# Patient Record
Sex: MALE | Race: WHITE | NOT HISPANIC OR LATINO | Employment: OTHER | URBAN - METROPOLITAN AREA
[De-identification: names, ages, dates, MRNs, and addresses within clinical notes are randomized per-mention and may not be internally consistent; named-entity substitution may affect disease eponyms.]

---

## 2023-09-03 PROBLEM — M17.11 OSTEOARTHRITIS OF RIGHT KNEE: Status: ACTIVE | Noted: 2023-09-03

## 2023-09-03 PROBLEM — E87.6 HYPOKALEMIA: Status: ACTIVE | Noted: 2023-09-03

## 2023-09-03 PROBLEM — M17.12 PRIMARY OSTEOARTHRITIS OF LEFT KNEE: Status: ACTIVE | Noted: 2023-09-03

## 2023-09-03 PROBLEM — I10 PRIMARY HYPERTENSION: Status: ACTIVE | Noted: 2023-09-03

## 2023-09-03 PROBLEM — R93.89 ABNORMAL X-RAY: Status: ACTIVE | Noted: 2023-09-03

## 2023-09-03 PROBLEM — F41.9 ANXIETY: Status: ACTIVE | Noted: 2023-09-03

## 2023-09-03 PROBLEM — M19.079: Status: ACTIVE | Noted: 2023-09-03

## 2023-09-03 PROBLEM — F10.20 ALCOHOLISM (MULTI): Status: ACTIVE | Noted: 2023-09-03

## 2023-09-03 PROBLEM — M16.0 OSTEOARTHRITIS OF BOTH HIPS: Status: ACTIVE | Noted: 2023-09-03

## 2023-09-03 PROBLEM — Q66.6 VALGUS DEFORMITY OF BOTH FEET: Status: ACTIVE | Noted: 2023-09-03

## 2023-09-03 PROBLEM — R29.898 WEAKNESS OF RIGHT LOWER EXTREMITY: Status: ACTIVE | Noted: 2023-09-03

## 2023-09-03 PROBLEM — M21.70 LEG LENGTH DISCREPANCY: Status: ACTIVE | Noted: 2023-09-03

## 2023-09-03 PROBLEM — F33.2 MAJOR DEPRESSIVE DISORDER, RECURRENT SEVERE WITHOUT PSYCHOTIC FEATURES (MULTI): Status: ACTIVE | Noted: 2023-09-03

## 2023-09-03 PROBLEM — R26.9 ABNORMAL GAIT: Status: ACTIVE | Noted: 2023-09-03

## 2023-09-03 PROBLEM — F41.1 GAD (GENERALIZED ANXIETY DISORDER): Status: ACTIVE | Noted: 2023-09-03

## 2023-09-03 PROBLEM — Z85.820 H/O MALIGNANT MELANOMA: Status: ACTIVE | Noted: 2023-09-03

## 2023-09-03 PROBLEM — M77.32 CALCANEAL SPUR OF LEFT FOOT: Status: ACTIVE | Noted: 2023-09-03

## 2023-09-03 PROBLEM — M51.369 LUMBAR DEGENERATIVE DISC DISEASE: Status: ACTIVE | Noted: 2023-09-03

## 2023-09-03 PROBLEM — J32.0 CHRONIC MAXILLARY SINUSITIS: Status: ACTIVE | Noted: 2023-09-03

## 2023-09-03 PROBLEM — M20.21 HALLUX RIGIDUS, RIGHT FOOT: Status: ACTIVE | Noted: 2023-09-03

## 2023-09-03 PROBLEM — G62.9 NEUROPATHY: Status: ACTIVE | Noted: 2023-09-03

## 2023-09-03 PROBLEM — G92.8 TOXIC METABOLIC ENCEPHALOPATHY: Status: ACTIVE | Noted: 2023-09-03

## 2023-09-03 PROBLEM — M51.36 LUMBAR DEGENERATIVE DISC DISEASE: Status: ACTIVE | Noted: 2023-09-03

## 2023-09-03 PROBLEM — R20.0 NUMBNESS: Status: ACTIVE | Noted: 2023-09-03

## 2023-09-03 PROBLEM — F10.939 ALCOHOL WITHDRAWAL SYNDROME (MULTI): Status: ACTIVE | Noted: 2023-09-03

## 2023-09-03 PROBLEM — R09.89 ALTERED CARDIOPULMONARY TISSUE PERFUSION: Status: ACTIVE | Noted: 2023-09-03

## 2023-09-03 PROBLEM — F51.01 PRIMARY INSOMNIA: Status: ACTIVE | Noted: 2023-09-03

## 2023-09-03 PROBLEM — Z91.89 AT RISK FOR INJURY RELATED TO RESTRAINTS: Status: ACTIVE | Noted: 2023-09-03

## 2023-09-03 PROBLEM — N13.30 BILATERAL HYDRONEPHROSIS: Status: ACTIVE | Noted: 2023-09-03

## 2023-09-03 PROBLEM — G89.29 OTHER CHRONIC PAIN: Status: ACTIVE | Noted: 2023-09-03

## 2023-09-03 PROBLEM — E87.1 HYPONATREMIA: Status: ACTIVE | Noted: 2023-09-03

## 2023-09-03 PROBLEM — M17.0 PRIMARY OSTEOARTHRITIS OF BOTH KNEES: Status: ACTIVE | Noted: 2023-09-03

## 2023-09-03 PROBLEM — M20.22 HALLUX RIGIDUS OF LEFT FOOT: Status: ACTIVE | Noted: 2023-09-03

## 2023-09-03 PROBLEM — E86.0 DEHYDRATION: Status: ACTIVE | Noted: 2023-09-03

## 2023-09-03 PROBLEM — M51.26 LUMBAGO DUE TO DISPLACEMENT OF INTERVERTEBRAL DISC: Status: ACTIVE | Noted: 2023-09-03

## 2023-09-03 PROBLEM — E78.2 MIXED HYPERLIPIDEMIA: Status: ACTIVE | Noted: 2023-09-03

## 2023-09-03 RX ORDER — ALPRAZOLAM 1 MG/1
TABLET ORAL
COMMUNITY
Start: 2019-10-02 | End: 2023-12-14 | Stop reason: WASHOUT

## 2023-09-03 RX ORDER — ATORVASTATIN CALCIUM 10 MG/1
1 TABLET, FILM COATED ORAL DAILY
COMMUNITY
End: 2024-02-27

## 2023-09-03 RX ORDER — YEAST,DRIED (S. CEREVISIAE)
POWDER (GRAM) ORAL
COMMUNITY
Start: 2022-11-10 | End: 2023-12-14 | Stop reason: WASHOUT

## 2023-09-03 RX ORDER — HYDROXYZINE PAMOATE 25 MG/1
1-2 CAPSULE ORAL NIGHTLY PRN
COMMUNITY
Start: 2022-11-10 | End: 2023-12-14 | Stop reason: WASHOUT

## 2023-09-03 RX ORDER — ESCITALOPRAM OXALATE 5 MG/1
1 TABLET ORAL DAILY
COMMUNITY
End: 2023-12-14 | Stop reason: WASHOUT

## 2023-09-03 RX ORDER — CHLORDIAZEPOXIDE HYDROCHLORIDE 25 MG/1
1 CAPSULE, GELATIN COATED ORAL 2 TIMES DAILY
COMMUNITY
Start: 2021-08-13 | End: 2023-12-14 | Stop reason: WASHOUT

## 2023-09-03 RX ORDER — IBUPROFEN 800 MG/1
1 TABLET ORAL EVERY 6 HOURS PRN
COMMUNITY
Start: 2022-11-04

## 2023-09-03 RX ORDER — NAPROXEN SODIUM 220 MG
TABLET ORAL
COMMUNITY

## 2023-09-03 RX ORDER — TAMSULOSIN HYDROCHLORIDE 0.4 MG/1
1 CAPSULE ORAL DAILY
COMMUNITY
End: 2023-12-14 | Stop reason: WASHOUT

## 2023-09-03 RX ORDER — DICLOFENAC SODIUM 20 MG/G
SOLUTION TOPICAL
COMMUNITY
Start: 2021-09-30 | End: 2023-12-14 | Stop reason: WASHOUT

## 2023-09-03 RX ORDER — METOPROLOL SUCCINATE 50 MG/1
1 TABLET, EXTENDED RELEASE ORAL DAILY
COMMUNITY

## 2023-09-03 RX ORDER — NAPROXEN SODIUM 220 MG/1
1 TABLET, FILM COATED ORAL DAILY
COMMUNITY
End: 2023-12-14 | Stop reason: WASHOUT

## 2023-09-03 RX ORDER — IBUPROFEN 100 MG/5ML
1 SUSPENSION, ORAL (FINAL DOSE FORM) ORAL DAILY
COMMUNITY
Start: 2022-11-10

## 2023-09-03 RX ORDER — PNV NO.95/FERROUS FUM/FOLIC AC 28MG-0.8MG
1 TABLET ORAL DAILY
COMMUNITY
Start: 2022-11-10

## 2023-09-03 RX ORDER — ESCITALOPRAM OXALATE 10 MG/1
1 TABLET ORAL DAILY
COMMUNITY
Start: 2021-08-13 | End: 2023-12-14 | Stop reason: WASHOUT

## 2023-09-03 RX ORDER — GABAPENTIN 100 MG/1
1 CAPSULE ORAL EVERY 12 HOURS
COMMUNITY
End: 2023-12-14 | Stop reason: WASHOUT

## 2023-09-03 RX ORDER — LOSARTAN POTASSIUM 100 MG/1
1 TABLET ORAL DAILY
COMMUNITY
End: 2024-05-25 | Stop reason: SDUPTHER

## 2023-09-03 RX ORDER — MULTIVITAMIN
1 TABLET ORAL DAILY
COMMUNITY
Start: 2022-11-10

## 2023-09-03 RX ORDER — NAPROXEN 250 MG/1
TABLET ORAL
COMMUNITY
End: 2023-12-14 | Stop reason: WASHOUT

## 2023-09-03 RX ORDER — GUAIFENESIN 1200 MG
1 TABLET, EXTENDED RELEASE 12 HR ORAL EVERY 6 HOURS PRN
COMMUNITY

## 2023-09-11 PROBLEM — M22.2X2 PATELLOFEMORAL SYNDROME OF LEFT KNEE: Status: ACTIVE | Noted: 2023-09-11

## 2023-12-11 ENCOUNTER — TELEPHONE (OUTPATIENT)
Dept: PRIMARY CARE | Facility: CLINIC | Age: 73
End: 2023-12-11
Payer: MEDICARE

## 2023-12-11 DIAGNOSIS — I10 PRIMARY HYPERTENSION: Primary | ICD-10-CM

## 2023-12-11 NOTE — TELEPHONE ENCOUNTER
Patient would like orders placed for blood work prior to his 12/14/23 appointment if needed.  Please notify when placed.  Thank you.

## 2023-12-13 ENCOUNTER — LAB (OUTPATIENT)
Dept: LAB | Facility: LAB | Age: 73
End: 2023-12-13
Payer: MEDICARE

## 2023-12-13 DIAGNOSIS — I10 PRIMARY HYPERTENSION: ICD-10-CM

## 2023-12-13 LAB
ANION GAP SERPL CALC-SCNC: 12 MMOL/L
BUN SERPL-MCNC: 10 MG/DL (ref 8–25)
CALCIUM SERPL-MCNC: 9.3 MG/DL (ref 8.5–10.4)
CHLORIDE SERPL-SCNC: 100 MMOL/L (ref 97–107)
CO2 SERPL-SCNC: 24 MMOL/L (ref 24–31)
CREAT SERPL-MCNC: 0.9 MG/DL (ref 0.4–1.6)
GFR SERPL CREATININE-BSD FRML MDRD: >90 ML/MIN/1.73M*2
GLUCOSE SERPL-MCNC: 88 MG/DL (ref 65–99)
POTASSIUM SERPL-SCNC: 4.8 MMOL/L (ref 3.4–5.1)
SODIUM SERPL-SCNC: 136 MMOL/L (ref 133–145)

## 2023-12-13 PROCEDURE — 36415 COLL VENOUS BLD VENIPUNCTURE: CPT

## 2023-12-13 PROCEDURE — 80048 BASIC METABOLIC PNL TOTAL CA: CPT

## 2023-12-14 ENCOUNTER — OFFICE VISIT (OUTPATIENT)
Dept: PRIMARY CARE | Facility: CLINIC | Age: 73
End: 2023-12-14
Payer: MEDICARE

## 2023-12-14 VITALS
HEART RATE: 69 BPM | SYSTOLIC BLOOD PRESSURE: 126 MMHG | HEIGHT: 72 IN | DIASTOLIC BLOOD PRESSURE: 78 MMHG | OXYGEN SATURATION: 97 % | WEIGHT: 275 LBS | BODY MASS INDEX: 37.25 KG/M2

## 2023-12-14 DIAGNOSIS — I10 PRIMARY HYPERTENSION: ICD-10-CM

## 2023-12-14 DIAGNOSIS — E78.2 MIXED HYPERLIPIDEMIA: Primary | ICD-10-CM

## 2023-12-14 DIAGNOSIS — Z12.11 SCREEN FOR COLON CANCER: ICD-10-CM

## 2023-12-14 DIAGNOSIS — M17.0 OSTEOARTHRITIS OF BOTH KNEES, UNSPECIFIED OSTEOARTHRITIS TYPE: ICD-10-CM

## 2023-12-14 PROCEDURE — 3074F SYST BP LT 130 MM HG: CPT | Performed by: FAMILY MEDICINE

## 2023-12-14 PROCEDURE — 3078F DIAST BP <80 MM HG: CPT | Performed by: FAMILY MEDICINE

## 2023-12-14 PROCEDURE — 1036F TOBACCO NON-USER: CPT | Performed by: FAMILY MEDICINE

## 2023-12-14 PROCEDURE — 1159F MED LIST DOCD IN RCRD: CPT | Performed by: FAMILY MEDICINE

## 2023-12-14 PROCEDURE — 99214 OFFICE O/P EST MOD 30 MIN: CPT | Performed by: FAMILY MEDICINE

## 2023-12-14 PROCEDURE — 1125F AMNT PAIN NOTED PAIN PRSNT: CPT | Performed by: FAMILY MEDICINE

## 2023-12-14 ASSESSMENT — PATIENT HEALTH QUESTIONNAIRE - PHQ9
2. FEELING DOWN, DEPRESSED OR HOPELESS: NOT AT ALL
1. LITTLE INTEREST OR PLEASURE IN DOING THINGS: NOT AT ALL
SUM OF ALL RESPONSES TO PHQ9 QUESTIONS 1 AND 2: 0

## 2023-12-14 ASSESSMENT — PAIN SCALES - GENERAL: PAINLEVEL: 3

## 2023-12-14 ASSESSMENT — ENCOUNTER SYMPTOMS
CHEST TIGHTNESS: 0
SHORTNESS OF BREATH: 0

## 2023-12-14 NOTE — PROGRESS NOTES
"[facility]: [facility]  E/M EVALUATION    Tito Garcia is a 72 y.o. male who presents for Follow-up.    Subjective   HTN -controlled.  Labs yesterday.  Reports compliance.  Exercise 2 days per week now.      Hyperlipidemia- controlled.      Bilateral knee pain.  -wants evaluated for replacement.     Colon screening -states doesn't have fully formed stool.  Hasn't completed cologaurd.            Review of Systems   Respiratory:  Negative for chest tightness and shortness of breath.        Objective   Vitals:    12/14/23 0743   BP: 126/78   Pulse: 69   SpO2: 97%     Physical Exam  Constitutional:       Appearance: Normal appearance.   Cardiovascular:      Rate and Rhythm: Normal rate and regular rhythm.      Heart sounds: No murmur heard.  Pulmonary:      Effort: Pulmonary effort is normal.      Breath sounds: Normal breath sounds.   Neurological:      Mental Status: He is alert.       Cholesterol   Date Value Ref Range Status   06/13/2023 148 133 - 200 MG/DL Final     Triglycerides   Date Value Ref Range Status   06/13/2023 71 40 - 150 MG/DL Final     HDL   Date Value Ref Range Status   06/13/2023 44 >40 MG/DL Final     Comment:     National Cholesterol Education Program(NCFP)guidelines:   <40 mg/dl:Low HDL-cholesterol(major risk factor for CHD)   >60 mg/dl:High HDL-cholesterol(\"negative\"risk factor for CHD)   HDL-cholesterol is affected by a number of factors,e.g.,smoking,  exercise,hormones,sex and age.       LDL Calculated   Date Value Ref Range Status   06/13/2023 90 65 - 130 MG/DL Final     Cholesterol/HDL Ratio   Date Value Ref Range Status   06/13/2023 3.4 RATIO Final     Comment:     According to the American Heart Association, the goal is to maintain   the total cholesterol/HDL ratio at 5-to-1 or lower with an optimum   ratio of 3.5-to-1.  Performed at [address] [address]       Glucose   Date Value Ref Range Status   12/13/2023 88 65 - 99 mg/dL Final "     Sodium   Date Value Ref Range Status   12/13/2023 136 133 - 145 mmol/L Final     Potassium   Date Value Ref Range Status   12/13/2023 4.8 3.4 - 5.1 mmol/L Final     ALT (SGPT)   Date Value Ref Range Status   06/13/2023 23 5 - 40 U/L Final     eGFR   Date Value Ref Range Status   12/13/2023 >90 >60 mL/min/1.73m*2 Final     Comment:     Calculations of estimated GFR are performed using the 2021 CKD-EPI Study Refit equation without the race variable for the IDMS-Traceable creatinine methods.  https://jasn.asnjournals.org/content/early/2021/09/22/ASN.6650154512     INR   Date Value Ref Range Status   08/03/2021 1.0 0.86 - 1.16 Final     Comment:     INR Therapeutic Range: 2.0-3.5  Performed at [address] [address]       Thyroid Stimulating Hormone   Date Value Ref Range Status   08/01/2021 2.59 0.27 - 4.20 MIU/L Final     Comment:     Performed at [address] [address]       Assessment/Plan      Patient Active Problem List   Diagnosis    Altered cardiopulmonary tissue perfusion    Primary hypertension    Primary osteoarthritis of left knee    Osteoarthritis of right knee    Weakness of right lower extremity    Valgus deformity of both feet    Toxic metabolic encephalopathy    Primary osteoarthritis of both knees    Primary insomnia    Other chronic pain    Osteoarthritis of both hips    Numbness    Neuropathy    Mixed hyperlipidemia    Major depressive disorder, recurrent severe without psychotic features (CMS/HCC)    Lumbar degenerative disc disease    Leg length discrepancy    Hyponatremia    Hypokalemia    Hallux rigidus, right foot    Hallux rigidus of left foot    H/O Malignant melanoma    EMILY (generalized anxiety disorder)    Lumbago due to displacement of intervertebral disc    Dehydration    Degenerative joint disease of toe    Chronic maxillary sinusitis    Calcaneal spur of left foot    Bilateral hydronephrosis    Anxiety    Alcoholism (CMS/HCC)    Alcohol  withdrawal syndrome (CMS/HCC)    Abnormal x-ray    Abnormal gait    At risk for injury related to restraints    Patellofemoral syndrome of left knee       Diagnoses and all orders for this visit:  Mixed hyperlipidemia  -     Lipid Panel; Future  Primary hypertension  -     Comprehensive Metabolic Panel; Future  Osteoarthritis of both knees, unspecified osteoarthritis type  -     Referral to Orthopaedic Surgery; Future  Screen for colon cancer    FIT test provided.   The patient was encouraged to ensure that any/all documentation is accurate and up to date, and that our office be provided a copy in the event that anything changes.         Edward Leong MD

## 2024-02-26 DIAGNOSIS — I73.9 PERIPHERAL VASCULAR DISEASE, UNSPECIFIED (CMS-HCC): ICD-10-CM

## 2024-02-27 RX ORDER — ATORVASTATIN CALCIUM 10 MG/1
10 TABLET, FILM COATED ORAL DAILY
Qty: 90 TABLET | Refills: 3 | Status: SHIPPED | OUTPATIENT
Start: 2024-02-27

## 2024-02-29 ENCOUNTER — HOSPITAL ENCOUNTER (OUTPATIENT)
Dept: RADIOLOGY | Facility: HOSPITAL | Age: 74
Discharge: HOME | End: 2024-02-29
Payer: MEDICARE

## 2024-02-29 DIAGNOSIS — M17.11 UNILATERAL PRIMARY OSTEOARTHRITIS, RIGHT KNEE: ICD-10-CM

## 2024-02-29 PROCEDURE — 73700 CT LOWER EXTREMITY W/O DYE: CPT | Mod: RT

## 2024-05-23 DIAGNOSIS — I10 PRIMARY HYPERTENSION: Primary | ICD-10-CM

## 2024-05-25 RX ORDER — LOSARTAN POTASSIUM 100 MG/1
100 TABLET ORAL DAILY
Qty: 90 TABLET | Refills: 3 | Status: SHIPPED | OUTPATIENT
Start: 2024-05-25 | End: 2025-05-25

## 2024-06-05 ENCOUNTER — TELEPHONE (OUTPATIENT)
Dept: ORTHOPEDIC SURGERY | Facility: HOSPITAL | Age: 74
End: 2024-06-05
Payer: MEDICARE

## 2024-06-05 ENCOUNTER — HOSPITAL ENCOUNTER (OUTPATIENT)
Facility: HOSPITAL | Age: 74
Setting detail: OUTPATIENT SURGERY
End: 2024-06-05
Attending: ORTHOPAEDIC SURGERY | Admitting: ORTHOPAEDIC SURGERY
Payer: MEDICARE

## 2024-06-05 NOTE — TELEPHONE ENCOUNTER
Thank you for taking my call today, you have been scheduled for a Joint Replacement class on Monday  Merlene 10, 2024 from 9:00am-11:00am at [facility].  We are located at [address] [place] [address].    You will enter the parking lot off of [facility] and enter the main entrance immediately on your right.    This class is to help you prepare for your Right Total Knee Replacement    Please be sure to check your Care Companion Pathway for surveys to complete before class!  We will use this information to track your progress throughout recovery.    Please arrive 15 minutes early. Class will be held on the 2nd floor nursing unit, longterm down the reyes in the nutrition area. If you need assistance, please ask staff to direct you to joint replacement class. It is encouraged that you bring your care partner or someone who will be helping you after surgery to this class so that they understand the process also.     Please don't hesitate to reach out if you have any additional questions or concerns.    Kalli Bryan MBA, BSN, RN-BC  Orthopedic Program Navigator  [facility]   [phone]

## 2024-06-06 NOTE — PROGRESS NOTES
Thank you for attending our Joint Replacement class today in preparation for your upcoming surgery.  Topics discussed include:    MyChart Enrollment  Communication with Care Team  My Chart is the best form of communication to reach all of your caregivers  You can send messages to specific care givers, or a care team  Continued Education  You will be enrolled in a Total Joint Replacement care plan to receive additional education before and after surgery  You can review a short recording of the class content  Access to Medical Records  You can access test results, office notes, appointments, etc.  You can connect to other healthcare systems who use [facility] [employer] ([facility], [facility], [facility], etc.)  [employer]  Program Information  Consent to Enroll    Background/Understanding of Joint Replacement Surgery  Potential for same day discharge  Any questions or concerns to be directed to the surgeon's office    How to Prepare for Surgery  Use of Nicotine Products/Smoking  Stop several weeks before surgery  Such products slow down the healing process and increase risk of post-op infection and complications  Clearance for Surgery  Medical Clearance by Specialists  Dental Clearance  Cracked/Broken/Loose teeth left untreated may postpone surgery  The importance of post-op antibiotics for dental visits per surgeon protocol  Preadmission Testing  **Potential for postponed surgery if appropriate clearance is not obtained  Medication Instruction  Follow instructions provided by the doctor who prescribes your medication (typically, but not limited to cardiologist)  Preadmission testing will provide additional instructions during your appointment on what to stop and what to take as you get closer to surgery  For clarification of these instructions, please call preadmission testing directly - [phone]  Tips for Preparing the home for discharge from the hospital  Care Partner  Requirement for surgery, the patient must have a plan to have  help at home  Potential for postponed surgery if plan for home support cannot be established  How the care partner can help after surgery  CHG Body Wash/Mouth Wash  Follow the instructions given at preadmission testing  Body wash is to be used on the body and hair for 5 washes  Mouthwash is to be used the night before and morning of surgery  **This is a system-wide protocol developed by infectious disease professionals, we will not alter our recommendations for those with sensitive skin or those who have special hair needs.  Please follow the instructions as they are written as this will provide the best infection prevention measures for surgery.  Should you have an allergy to one of the products, please discuss with your preadmission team**    What to Expect in the Hospital/At Home  Morning of Surgery NPO Guidelines  Nothing to eat after midnight  Water can be consumed up to 2 hours prior to arrival  Surgical and Post-Surgical Care Team  Surgical Team  Anesthesia Team  Nursing  Physical Therapy  Care Coordinating  Pharmacy  Hospital Arrival Instructions  Arrive at the time provided to you  Consider traffic patterns (rush-hour) based on arrival time  Have arrangements made for a ride home  If discharging same day, care partner should remain at the hospital  Recovering after Surgery  Recovery Room - Visitors are not brought back  Transition to hospital room - 2nd Floor, Visitors will be directed to your room  The presence of and strategies for controlling surgical pain and swelling  The importance of early mobility  Side effects after surgery  What to expect if staying overnight    Discharge Planning  The intended plan for discharge will be for patients to discharge home  All patients require a care partner (family, friend, neighbor, etc.) to stay with the patient for the first few nights after surgery  The inability to secure help at home will postpone surgery  Home Care Services set up per surgeon order  Physical  Therapy  Occupational Therapy  **If desired, private duty care can be arranged by the patient ahead of time**  Outpatient Physical Therapy per surgeon order    Recovering at Home  Wound Care  Follow wound care instructions found in your discharge paperwork  Bandage is water-resistant and you may shower with the bandage  Do not scrub directly over the bandage  Do not submerge in water until cleared (bathtub, hot tub, pool, etc.)    Post-Op Risk Prevention  Infection Prevention  Promptly seek treatment for any infections post-operatively  Routine dental visits must be postponed for 3 months after surgery  Your surgeon may require antibiotics prior to future dental visits  Any concerns for infection not related directly to the knee or the hip should be managed by your primary care provider  Blood Clots  Be sure to complete the course of blood thinning medication as prescribed by your surgeon  Movement every 1-2 hours during the day is encouraged to prevent blood clots  Monitor for signs of blood clots  Wear compression stockings as prescribed by your surgeon  Constipation  Constipation is common following surgery  Drink plenty of fluids  Take stool softener/laxative as prescribed by your surgeon  Move around frequently  Eat foods high in fiber  Fall Prevention  Prepare home ahead of time to clear space to move with walker  Remove throw rugs and electrical cords from walkways  Install railings near any stairways with more than 2 steps  Use night lights for increased visibility at night  Continue to use your assistive device until cleared by surgeon or physical therapy  Dislocation Prevention - Not all procedures will have dislocation precautions  Follow dislocation precautions provided by your surgeon  It is OK to resume sexual activity about 6 weeks following surgery  Be sure to follow any dislocation precautions assigned    Durable Medical Equipment  Cold Therapy  Breg Cold Therapy Machines  Ice/Gel Packs  Assistive  Devices  Folding Walker with Wheels (in the front only)  No Rollators  Crutches if approved by Physical Therapy and Surgeon after surgery  Hip Kits  Raised Toilet Seats  Additional Compression Stockings    Joint Preservation  Healthy Activities when Cleared  Walking  Swimming  Bike Riding  Activities to Avoid  Refrain from repetitive motions which have a high impact on the joint  Gradual Progression  Progress activity slowly, listen to your body  Common Findings - NORMAL after surgery  Clicking/Grinding  Numbness near incision    Physical Therapy  Prehabilitation exercises  START TODAY ON BOTH LEGS  Surgery Specific Precautions  Follow surgery specific precautions found in your discharge paperwork    Follow-Up Visit  All patients will see their surgeon for a follow up visit after surgery  The visit may range from 2-6 weeks after surgery and is surgeon specific      Please don't hesitate to reach out if you have any additional questions or concerns.    Kalli Bryan MBA, BSN, RN-BC  JAVIER HuangN, RN  Orthopedic Program Navigators  [facility]   [phone]

## 2024-06-10 ENCOUNTER — EDUCATION (OUTPATIENT)
Dept: ORTHOPEDIC SURGERY | Facility: HOSPITAL | Age: 74
End: 2024-06-10
Payer: MEDICARE

## 2024-06-10 ASSESSMENT — KOOS JR
STANDING UPRIGHT: MODERATE
STRAIGHTENING KNEE FULLY: SEVERE
HOW SEVERE IS YOUR KNEE STIFFNESS AFTER FIRST WAKING IN MORNING: MILD
GOING UP OR DOWN STAIRS: SEVERE
TWISING OR PIVOTING ON KNEE: MODERATE
KOOS JR SCORING: 52.47
RISING FROM SITTING: MODERATE
BENDING TO THE FLOOR TO PICK UP OBJECT: MILD

## 2024-06-18 ENCOUNTER — LAB (OUTPATIENT)
Dept: LAB | Facility: LAB | Age: 74
End: 2024-06-18
Payer: MEDICARE

## 2024-06-18 DIAGNOSIS — I10 PRIMARY HYPERTENSION: ICD-10-CM

## 2024-06-18 DIAGNOSIS — E78.2 MIXED HYPERLIPIDEMIA: ICD-10-CM

## 2024-06-18 LAB
ALBUMIN SERPL-MCNC: 4.4 G/DL (ref 3.5–5)
ALP BLD-CCNC: 83 U/L (ref 35–125)
ALT SERPL-CCNC: 18 U/L (ref 5–40)
ANION GAP SERPL CALC-SCNC: 13 MMOL/L
AST SERPL-CCNC: 31 U/L (ref 5–40)
BILIRUB SERPL-MCNC: 0.8 MG/DL (ref 0.1–1.2)
BUN SERPL-MCNC: 10 MG/DL (ref 8–25)
CALCIUM SERPL-MCNC: 9.5 MG/DL (ref 8.5–10.4)
CHLORIDE SERPL-SCNC: 101 MMOL/L (ref 97–107)
CHOLEST SERPL-MCNC: 160 MG/DL (ref 133–200)
CHOLEST/HDLC SERPL: 3.6 {RATIO}
CO2 SERPL-SCNC: 25 MMOL/L (ref 24–31)
CREAT SERPL-MCNC: 1 MG/DL (ref 0.4–1.6)
EGFRCR SERPLBLD CKD-EPI 2021: 79 ML/MIN/1.73M*2
GLUCOSE SERPL-MCNC: 106 MG/DL (ref 65–99)
HDLC SERPL-MCNC: 45 MG/DL
LDLC SERPL CALC-MCNC: 102 MG/DL (ref 65–130)
POTASSIUM SERPL-SCNC: 4.4 MMOL/L (ref 3.4–5.1)
PROT SERPL-MCNC: 7.3 G/DL (ref 5.9–7.9)
SODIUM SERPL-SCNC: 139 MMOL/L (ref 133–145)
TRIGL SERPL-MCNC: 63 MG/DL (ref 40–150)

## 2024-06-18 PROCEDURE — 80053 COMPREHEN METABOLIC PANEL: CPT

## 2024-06-18 PROCEDURE — 36415 COLL VENOUS BLD VENIPUNCTURE: CPT

## 2024-06-18 PROCEDURE — 80061 LIPID PANEL: CPT

## 2024-06-27 ENCOUNTER — OFFICE VISIT (OUTPATIENT)
Dept: PRIMARY CARE | Facility: CLINIC | Age: 74
End: 2024-06-27
Payer: MEDICARE

## 2024-06-27 VITALS
SYSTOLIC BLOOD PRESSURE: 140 MMHG | HEIGHT: 70 IN | DIASTOLIC BLOOD PRESSURE: 84 MMHG | OXYGEN SATURATION: 96 % | BODY MASS INDEX: 38.94 KG/M2 | HEART RATE: 75 BPM | WEIGHT: 272 LBS

## 2024-06-27 DIAGNOSIS — E66.01 OBESITY, MORBID (MULTI): ICD-10-CM

## 2024-06-27 DIAGNOSIS — I10 PRIMARY HYPERTENSION: Primary | ICD-10-CM

## 2024-06-27 DIAGNOSIS — E78.2 MIXED HYPERLIPIDEMIA: ICD-10-CM

## 2024-06-27 DIAGNOSIS — F43.0 ACUTE REACTION TO STRESS: ICD-10-CM

## 2024-06-27 PROBLEM — F10.231 ALCOHOL DEPENDENCE WITH WITHDRAWAL DELIRIUM (MULTI): Status: ACTIVE | Noted: 2023-09-03

## 2024-06-27 PROCEDURE — 99214 OFFICE O/P EST MOD 30 MIN: CPT | Performed by: FAMILY MEDICINE

## 2024-06-27 PROCEDURE — 3077F SYST BP >= 140 MM HG: CPT | Performed by: FAMILY MEDICINE

## 2024-06-27 PROCEDURE — 1159F MED LIST DOCD IN RCRD: CPT | Performed by: FAMILY MEDICINE

## 2024-06-27 PROCEDURE — 1036F TOBACCO NON-USER: CPT | Performed by: FAMILY MEDICINE

## 2024-06-27 PROCEDURE — 1123F ACP DISCUSS/DSCN MKR DOCD: CPT | Performed by: FAMILY MEDICINE

## 2024-06-27 PROCEDURE — 1158F ADVNC CARE PLAN TLK DOCD: CPT | Performed by: FAMILY MEDICINE

## 2024-06-27 PROCEDURE — 3079F DIAST BP 80-89 MM HG: CPT | Performed by: FAMILY MEDICINE

## 2024-06-27 PROCEDURE — 1125F AMNT PAIN NOTED PAIN PRSNT: CPT | Performed by: FAMILY MEDICINE

## 2024-06-27 RX ORDER — FLAXSEED OIL 1000 MG
1 CAPSULE ORAL EVERY 24 HOURS
COMMUNITY

## 2024-06-27 RX ORDER — YEAST,DRIED (S. CEREVISIAE)
POWDER (GRAM) ORAL
COMMUNITY
Start: 2022-11-10

## 2024-06-27 ASSESSMENT — PATIENT HEALTH QUESTIONNAIRE - PHQ9
2. FEELING DOWN, DEPRESSED OR HOPELESS: NEARLY EVERY DAY
4. FEELING TIRED OR HAVING LITTLE ENERGY: NEARLY EVERY DAY
1. LITTLE INTEREST OR PLEASURE IN DOING THINGS: NEARLY EVERY DAY
10. IF YOU CHECKED OFF ANY PROBLEMS, HOW DIFFICULT HAVE THESE PROBLEMS MADE IT FOR YOU TO DO YOUR WORK, TAKE CARE OF THINGS AT HOME, OR GET ALONG WITH OTHER PEOPLE: VERY DIFFICULT
SUM OF ALL RESPONSES TO PHQ QUESTIONS 1-9: 13
6. FEELING BAD ABOUT YOURSELF - OR THAT YOU ARE A FAILURE OR HAVE LET YOURSELF OR YOUR FAMILY DOWN: NOT AT ALL
7. TROUBLE CONCENTRATING ON THINGS, SUCH AS READING THE NEWSPAPER OR WATCHING TELEVISION: NOT AT ALL
8. MOVING OR SPEAKING SO SLOWLY THAT OTHER PEOPLE COULD HAVE NOTICED. OR THE OPPOSITE, BEING SO FIGETY OR RESTLESS THAT YOU HAVE BEEN MOVING AROUND A LOT MORE THAN USUAL: NOT AT ALL
9. THOUGHTS THAT YOU WOULD BE BETTER OFF DEAD, OR OF HURTING YOURSELF: NOT AT ALL
5. POOR APPETITE OR OVEREATING: SEVERAL DAYS
3. TROUBLE FALLING OR STAYING ASLEEP OR SLEEPING TOO MUCH: NEARLY EVERY DAY
SUM OF ALL RESPONSES TO PHQ9 QUESTIONS 1 AND 2: 6

## 2024-06-27 ASSESSMENT — ENCOUNTER SYMPTOMS
PALPITATIONS: 0
ARTHRALGIAS: 1

## 2024-06-27 ASSESSMENT — PAIN SCALES - GENERAL: PAINLEVEL: 4
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
## 2024-07-01 ENCOUNTER — APPOINTMENT (OUTPATIENT)
Dept: CARDIOLOGY | Facility: HOSPITAL | Age: 74
End: 2024-07-01
Payer: MEDICARE

## 2024-07-01 ENCOUNTER — PRE-ADMISSION TESTING (OUTPATIENT)
Dept: PREADMISSION TESTING | Facility: HOSPITAL | Age: 74
End: 2024-07-01
Payer: MEDICARE

## 2024-07-01 ENCOUNTER — HOSPITAL ENCOUNTER (EMERGENCY)
Facility: HOSPITAL | Age: 74
Discharge: HOME | End: 2024-07-01
Attending: FAMILY MEDICINE
Payer: MEDICARE

## 2024-07-01 ENCOUNTER — APPOINTMENT (OUTPATIENT)
Dept: RADIOLOGY | Facility: HOSPITAL | Age: 74
End: 2024-07-01
Payer: MEDICARE

## 2024-07-01 VITALS
RESPIRATION RATE: 18 BRPM | HEIGHT: 72 IN | BODY MASS INDEX: 36.44 KG/M2 | TEMPERATURE: 97.9 F | WEIGHT: 269.07 LBS | OXYGEN SATURATION: 99 % | SYSTOLIC BLOOD PRESSURE: 180 MMHG | DIASTOLIC BLOOD PRESSURE: 97 MMHG | HEART RATE: 72 BPM

## 2024-07-01 VITALS
OXYGEN SATURATION: 99 % | BODY MASS INDEX: 36.57 KG/M2 | SYSTOLIC BLOOD PRESSURE: 168 MMHG | WEIGHT: 270 LBS | TEMPERATURE: 97.9 F | RESPIRATION RATE: 12 BRPM | HEART RATE: 60 BPM | HEIGHT: 72 IN | DIASTOLIC BLOOD PRESSURE: 72 MMHG

## 2024-07-01 DIAGNOSIS — Z01.818 PREOPERATIVE TESTING: Primary | ICD-10-CM

## 2024-07-01 DIAGNOSIS — I48.91 ATRIAL FIBRILLATION, UNSPECIFIED TYPE (MULTI): Primary | ICD-10-CM

## 2024-07-01 DIAGNOSIS — R73.9 ELEVATED BLOOD SUGAR: ICD-10-CM

## 2024-07-01 DIAGNOSIS — I10 PRIMARY HYPERTENSION: ICD-10-CM

## 2024-07-01 DIAGNOSIS — R79.89 ELEVATED D-DIMER: ICD-10-CM

## 2024-07-01 LAB
ALBUMIN SERPL BCP-MCNC: 4.4 G/DL (ref 3.4–5)
ALP SERPL-CCNC: 72 U/L (ref 33–136)
ALT SERPL W P-5'-P-CCNC: 20 U/L (ref 10–52)
ANION GAP SERPL CALC-SCNC: 14 MMOL/L (ref 10–20)
AST SERPL W P-5'-P-CCNC: 28 U/L (ref 9–39)
ATRIAL RATE: 208 BPM
BILIRUB SERPL-MCNC: 0.9 MG/DL (ref 0–1.2)
BUN SERPL-MCNC: 10 MG/DL (ref 6–23)
CALCIUM SERPL-MCNC: 9.7 MG/DL (ref 8.6–10.3)
CARDIAC TROPONIN I PNL SERPL HS: 4 NG/L (ref 0–20)
CHLORIDE SERPL-SCNC: 102 MMOL/L (ref 98–107)
CO2 SERPL-SCNC: 24 MMOL/L (ref 21–32)
CREAT SERPL-MCNC: 0.81 MG/DL (ref 0.5–1.3)
D DIMER PPP FEU-MCNC: 0.84 MG/L FEU (ref 0.19–0.5)
EGFRCR SERPLBLD CKD-EPI 2021: >90 ML/MIN/1.73M*2
ERYTHROCYTE [DISTWIDTH] IN BLOOD BY AUTOMATED COUNT: 12.1 % (ref 11.5–14.5)
GLUCOSE SERPL-MCNC: 107 MG/DL (ref 74–99)
HCT VFR BLD AUTO: 42.5 % (ref 41–52)
HGB BLD-MCNC: 14.2 G/DL (ref 13.5–17.5)
MCH RBC QN AUTO: 28.7 PG (ref 26–34)
MCHC RBC AUTO-ENTMCNC: 33.4 G/DL (ref 32–36)
MCV RBC AUTO: 86 FL (ref 80–100)
NRBC BLD-RTO: NORMAL /100{WBCS}
PLATELET # BLD AUTO: 259 X10*3/UL (ref 150–450)
POTASSIUM SERPL-SCNC: 4 MMOL/L (ref 3.5–5.3)
PROT SERPL-MCNC: 7.6 G/DL (ref 6.4–8.2)
Q ONSET: 211 MS
QRS COUNT: 10 BEATS
QRS DURATION: 94 MS
QT INTERVAL: 426 MS
QTC CALCULATION(BAZETT): 432 MS
QTC FREDERICIA: 430 MS
R AXIS: 10 DEGREES
RBC # BLD AUTO: 4.94 X10*6/UL (ref 4.5–5.9)
SODIUM SERPL-SCNC: 136 MMOL/L (ref 136–145)
T AXIS: 35 DEGREES
T OFFSET: 424 MS
VENTRICULAR RATE: 62 BPM
WBC # BLD AUTO: 7.1 X10*3/UL (ref 4.4–11.3)

## 2024-07-01 PROCEDURE — 85027 COMPLETE CBC AUTOMATED: CPT | Performed by: FAMILY MEDICINE

## 2024-07-01 PROCEDURE — 99285 EMERGENCY DEPT VISIT HI MDM: CPT | Mod: 25 | Performed by: FAMILY MEDICINE

## 2024-07-01 PROCEDURE — 93005 ELECTROCARDIOGRAM TRACING: CPT

## 2024-07-01 PROCEDURE — 87081 CULTURE SCREEN ONLY: CPT | Mod: BEALAB | Performed by: NURSE PRACTITIONER

## 2024-07-01 PROCEDURE — 84484 ASSAY OF TROPONIN QUANT: CPT | Performed by: FAMILY MEDICINE

## 2024-07-01 PROCEDURE — 2550000001 HC RX 255 CONTRASTS: Performed by: FAMILY MEDICINE

## 2024-07-01 PROCEDURE — 85300 ANTITHROMBIN III ACTIVITY: CPT | Performed by: FAMILY MEDICINE

## 2024-07-01 PROCEDURE — 71275 CT ANGIOGRAPHY CHEST: CPT | Performed by: RADIOLOGY

## 2024-07-01 PROCEDURE — 36415 COLL VENOUS BLD VENIPUNCTURE: CPT | Performed by: FAMILY MEDICINE

## 2024-07-01 PROCEDURE — 80053 COMPREHEN METABOLIC PANEL: CPT | Performed by: FAMILY MEDICINE

## 2024-07-01 PROCEDURE — 2500000001 HC RX 250 WO HCPCS SELF ADMINISTERED DRUGS (ALT 637 FOR MEDICARE OP): Performed by: FAMILY MEDICINE

## 2024-07-01 PROCEDURE — 96360 HYDRATION IV INFUSION INIT: CPT | Mod: 59 | Performed by: FAMILY MEDICINE

## 2024-07-01 PROCEDURE — 2500000004 HC RX 250 GENERAL PHARMACY W/ HCPCS (ALT 636 FOR OP/ED): Performed by: FAMILY MEDICINE

## 2024-07-01 PROCEDURE — 71275 CT ANGIOGRAPHY CHEST: CPT

## 2024-07-01 PROCEDURE — 93010 ELECTROCARDIOGRAM REPORT: CPT | Performed by: INTERNAL MEDICINE

## 2024-07-01 RX ORDER — CHLORHEXIDINE GLUCONATE ORAL RINSE 1.2 MG/ML
15 SOLUTION DENTAL AS NEEDED
Qty: 30 ML | Refills: 0 | Status: SHIPPED | OUTPATIENT
Start: 2024-07-01

## 2024-07-01 RX ORDER — CYANOCOBALAMIN (VITAMIN B-12) 250 MCG
250 TABLET ORAL DAILY
COMMUNITY

## 2024-07-01 ASSESSMENT — PAIN SCALES - GENERAL
PAINLEVEL_OUTOF10: 0 - NO PAIN
PAINLEVEL_OUTOF10: 3
PAINLEVEL_OUTOF10: 0 - NO PAIN

## 2024-07-01 ASSESSMENT — COLUMBIA-SUICIDE SEVERITY RATING SCALE - C-SSRS
2. HAVE YOU ACTUALLY HAD ANY THOUGHTS OF KILLING YOURSELF?: NO
1. IN THE PAST MONTH, HAVE YOU WISHED YOU WERE DEAD OR WISHED YOU COULD GO TO SLEEP AND NOT WAKE UP?: NO
6. HAVE YOU EVER DONE ANYTHING, STARTED TO DO ANYTHING, OR PREPARED TO DO ANYTHING TO END YOUR LIFE?: NO

## 2024-07-01 ASSESSMENT — PAIN - FUNCTIONAL ASSESSMENT
PAIN_FUNCTIONAL_ASSESSMENT: 0-10
PAIN_FUNCTIONAL_ASSESSMENT: 0-10

## 2024-07-01 ASSESSMENT — PAIN DESCRIPTION - DESCRIPTORS: DESCRIPTORS: SHARP

## 2024-07-01 NOTE — DISCHARGE INSTRUCTIONS
You are seen today for new onset atrial fibrillation.  You stated you had had no recent symptoms to suspect any cardiac arrhythmia.    In the ED, you were given IV fluids.    You were started on Eliquis.  This medication should be taken as 10 mg every 12 hours for 7 days as a loading dose, followed by 5 mg every 12 hours. This medication reduces your risk for stroke due to atrial fibrillation..  A prescription for this medication has been forwarded to your pharmacy.       A referral to Johnson Champion MD, cardiology has been made.  You should be hearing from them in the next few days.  Anticipate a full cardiac workup, and probable delay of your scheduled knee surgery.

## 2024-07-01 NOTE — PREPROCEDURE INSTRUCTIONS
Medication List            Accurate as of July 1, 2024 10:22 AM. Always use your most recent med list.                alpha tocopherol 670 mg (1,000 unit) capsule  Commonly known as: Vitamin E  Medication Adjustments for Surgery: Stop 7 days before surgery  Notes to patient: Last dose 7/15/2024     ascorbic acid 1,000 mg tablet  Commonly known as: Vitamin C  Medication Adjustments for Surgery: Stop 7 days before surgery  Notes to patient: Last dose on 7/15/2024     atorvastatin 10 mg tablet  Commonly known as: Lipitor  take 1 tablet by mouth once daily  Medication Adjustments for Surgery: Continue until night before surgery     chlorhexidine 0.12 % solution  Commonly known as: Peridex  Use 15 mL in the mouth or throat if needed (pre operative 15ml swish and spit the night befor and morning of surgery) for up to 2 doses.  Notes to patient: As directed     cyanocobalamin 250 mcg tablet  Commonly known as: Vitamin B-12  Medication Adjustments for Surgery: Stop 7 days before surgery  Notes to patient: Last dose on 7/15/2024     flaxseed oiL 1,000 mg capsule  Medication Adjustments for Surgery: Stop 7 days before surgery  Notes to patient: Last dose on 7/15/2024     losartan 100 mg tablet  Commonly known as: Cozaar  TAKE 1 TABLET BY MOUTH EVERY DAY  Medication Adjustments for Surgery: Stop 1 day before surgery  Notes to patient: Last dose on 7/21/2024     metoprolol succinate XL 50 mg 24 hr tablet  Commonly known as: Toprol-XL  Medication Adjustments for Surgery: Take morning of surgery with sip of water, no other fluids     Move Free Joint Health 750 mg-100 mg- 1.65 mg-108 mg tablet  Generic drug: glucosam-chond-hyalu-CF borate  Medication Adjustments for Surgery: Stop 7 days before surgery  Notes to patient: Last dose on 7/15/2024     multivitamin tablet  Medication Adjustments for Surgery: Stop 7 days before surgery  Notes to patient: Last dose on 7/15/2024     TURMERIC ORAL  Medication Adjustments for Surgery:  Stop 7 days before surgery  Notes to patient: Last dose on 7/15/2024              NPO Instructions:    Do not eat any food after midnight the night before your surgery/procedure.  You may have clear liquids until TWO hours before surgery/procedure. This includes water, black tea/coffee, (no milk or cream) apple juice and electrolyte drinks (Gatorade).  You may chew gum up to TWO hours before your surgery/procedure.    Additional Instructions:     Seven/Six Days before Surgery:  Review your medication instructions, stop indicated medications  Five Days before Surgery:  Review your medication instructions, stop indicated medications  Begin using your Hibiclens  Three Days before Surgery:  Review your medication instructions, stop indicated medications  The Day before Surgery:  Review your medication instructions, stop indicated medications  You will be contacted regarding the time of your arrival to facility and surgery time  Do not eat any food after Midnight  Day of Surgery:  Review your medication instructions, take indicated medications  You may have clear liquids until TWO hours before surgery/procedure.  This includes water, black tea/coffee, (no milk or cream) apple juice and electrolyte drinks (Gatorade)  You may chew gum up to TWO hours before your surgery/procedure  Wear  comfortable loose fitting clothing  Do not use moisturizers, creams, lotions or perfume  All jewelry and valuables should be left at home  CONTACT SURGEON'S OFFICE IF YOU DEVELOP:  * Fever = 100.4 F   * New respiratory symptoms (e.g. cough, shortness of breath, respiratory distress, sore throat)  * Recent loss of taste or smell  *Flu like symptoms such as headache, fatigue or gastrointestinal symptoms  * You develop any open sores, shingles, burning or painful urination   AND/OR:  * You no longer wish to have the surgery.  * Any other personal circumstances change that may lead to the need to cancel or defer this surgery.  *You were  admitted to any hospital within one week of your planned procedure.    SMOKING:  *Quitting smoking can make a huge difference to your health and recovery from surgery.    *If you need help with quitting, call 1-516-QUIT-NOW.    THE DAY BEFORE SURGERY:  *Do not eat any food after midnight the night before your surgery.   *You may have up to 13.5 OUNCES of clear liquids until TWO hours before your instructed ARRIVAL TIME to hospital. This includes water, black tea/coffee, (no milk or cream) apple juice, clear broth and electrolyte drinks (Gatorade). Please avoid clear liquids that are red in color.   *You may chew gum/mints up to TWO hours before your surgery/procedure.    SURGICAL TIME:  *You will be contacted between 2 p.m. and 3 p.m. the business day before your surgery with your arrival time.  *If you haven't received a call by 3pm, call [phone]  *Scheduled surgery times may change and you will be notified if this occurs-check your personal voicemail for any updates.    ON THE MORNING OF SURGERY:  *Wear comfortable, loose fitting clothing.   *Do not use moisturizers, creams, lotions or perfume.  *All jewelry and valuables should be left at home.  *Prosthetic devices such as contact lenses, hearing aids, dentures, eyelash extensions, hairpins and body piercing must be removed before surgery.    BRING WITH YOU:  *Photo ID and insurance card  *Current list of medications and allergies  *Pacemaker/Defibrillator/Heart stent cards  *CPAP machine and mask  *Slings/splints/crutches  *Copy of your complete Advanced Directive/DHPOA-if applicable  *Neurostimulator implant remote    PARKING AND ARRIVAL:  *Check in at the Main Entrance desk and let them know you are here for surgery.    IF YOU ARE HAVING OUTPATIENT/SAME DAY SURGERY:  *A responsible adult MUST accompany you at the time of discharge and stay with you for 24 hours after your surgery.  *You may NOT drive yourself home after surgery.  *You may use a taxi or  ride sharing service (Lyft, Uber) to return home ONLY if you are accompanied by a friend or family member.  *Instructions for resuming your medications will be provided by your surgeon.    Thank you for coming to Pre Admission testing.     If I have prescribe medication please don't forget to  at your pharmacy.     Any questions about today's visit call [phone] and leave a message in the general mailbox.    Patient instructed to ambulate as soon as possible postoperatively to decrease thromboembolic risk.    Juana Ross, APRN-CNP    Thank you for visiting [facility].  If you have any changes to your health condition, please call the surgeons office to alert them and give them details of your symptoms.        Preoperative Fasting Guidelines    Why must I stop eating and drinking near surgery time?  With sedation, food or liquid in your stomach can enter your lungs causing serious complications  Increases nausea and vomiting    When do I need to stop eating and drinking before my surgery?  Do not eat any food after midnight the night before your surgery/procedure.  You may have up to 13.5 ounces of clear liquid until TWO hours before your instructed arrival time to the hospital.  This includes water, black tea/coffee, (no milk or cream) apple juice, and electrolyte drinks (Gatorade)  You may chew gum until TWO hours before your surgery/procedure      Additional Instructions:     The Day before Surgery:  -Review your medication instructions, stop indicated medications  -You will be contacted in the evening regarding the time of your arrival to facility and surgery time    Day of Surgery:  -Review your medication instructions, take indicated medications  -Wear comfortable loose fitting clothing  -Do not use moisturizers, creams, lotions or perfume  -All jewelry and valuables should be left at home              Preoperative Brain Exercises    What are brain exercises?  A brain  exercise is any activity that engages your thinking (cognitive) skills.    What types of activities are considered brain exercises?  Jigsaw puzzles, crossword puzzles, word jumble, memory games, word search, and many more.  Many can be found free online or on your phone via a mobile alicia.    Why should I do brain exercises before my surgery?  More recent research has shown brain exercise before surgery can lower the risk of postoperative delirium (confusion) which can be especially important for older adults.  Patients who did brain exercises for 5 to 10 hours the days before surgery, cut their risk of postoperative delirium in half up to 1 week after surgery.                  [facility]    Preoperative Deep Breathing Exercises    Why it is important to do deep breathing exercises before my surgery?  Deep breathing exercises strengthen your breathing muscles.  This helps you to recover after your surgery and decreases the chance of breathing complications.      How are the deep breathing exercises done?  Sit straight with your back supported.  Breathe in deeply and slowly through your nose. Your lower rib cage should expand and your abdomen may move forward.  Hold that breath for 3 to 5 seconds.  Breathe out through pursed lips, slowly and completely.  Rest and repeat 10 times every hour while awake.  Rest longer if you become dizzy or lightheaded.                [facility]    Preoperative Deep Breathing Exercises    Why it is important to do deep breathing exercises before my surgery?  Deep breathing exercises strengthen your breathing muscles.  This helps you to recover after your surgery and decreases the chance of breathing complications.      How are the deep breathing exercises done?  Sit straight with your back supported.  Breathe in deeply and slowly through your nose. Your lower rib cage should expand and your abdomen may move forward.  Hold that breath  for 3 to 5 seconds.  Breathe out through pursed lips, slowly and completely.  Rest and repeat 10 times every hour while awake.  Rest longer if you become dizzy or lightheaded.      Patient Information: Incentive Spirometer  What is an incentive spirometer?  An incentive spirometer is a device used before and after surgery to “exercise” your lungs.  It helps you to take deeper breaths to expand your lungs.  Below is an example of a basic incentive spirometer.  The device you receive may differ slightly but they all function the same.    Why do I need to use an incentive spirometer?  Using your incentive spirometer prepares your lungs for surgery and helps prevent lung problems after surgery.  How do I use my incentive spirometer?  When you're using your incentive spirometer, make sure to breathe through your mouth. If you breathe through your nose, the incentive spirometer won't work properly. You can hold your nose if you have trouble.  If you feel dizzy at any time, stop and rest. Try again at a later time.  Follow the steps below:  Set up your incentive spirometer, expand the flexible tubing and connect to the outlet.  Sit upright in a chair or bed. Hold the incentive spirometer at eye level.   Put the mouthpiece in your mouth and close your lips tightly around it. Slowly breathe out (exhale) completely.  Breathe in (inhale) slowly through your mouth as deeply as you can. As you take a breath, you will see the piston rise inside the large column. While the piston rises, the indicator should move upwards. It should stay in between the 2 arrows (see Figure).  Try to get the piston as high as you can, while keeping the indicator between the arrows.   If the indicator doesn't stay between the arrows, you're breathing either too fast or too slow.  When you get it as high as you can, hold your breath for 10 seconds, or as long as possible. While you're holding your breath, the piston will slowly fall to the base of the  spirometer.  Once the piston reaches the bottom of the spirometer, breathe out slowly through your mouth. Rest for a few seconds.  Repeat 10 times. Try to get the piston to the same level with each breath.  Repeat every hour while awake  You can carefully clean the outside of the mouthpiece with an alcohol wipe or soap and water.      Patient and Family Education             Ways You Can Help Prevent Blood Clots             This handout explains some simple things you can do to help prevent blood clots.      Blood clots are blockages that can form in the body's veins. When a blood clot forms in your deep veins, it may be called a deep vein thrombosis, or DVT for short. Blood clots can happen in any part of the body where blood flows, but they are most common in the arms and legs. If a piece of a blood clot breaks free and travels to the lungs, it is called a pulmonary embolus (PE). A PE can be a very serious problem.         Being in the hospital or having surgery can raise your chances of getting a blood clot because you may not be well enough to move around as much as you normally do.         Ways you can help prevent blood clots in the hospital         Wearing SCDs. SCDs stands for Sequential Compression Devices.   SCDs are special sleeves that wrap around your legs  They attach to a pump that fills them with air to gently squeeze your legs every few minutes.   This helps return the blood in your legs to your heart.   SCDs should only be taken off when walking or bathing.   SCDs may not be comfortable, but they can help save your life.               Wearing compression stockings - if your doctor orders them. These special snug fitting stockings gently squeeze your legs to help blood flow.       Walking. Walking helps move the blood in your legs.   If your doctor says it is ok, try walking the halls at least   5 times a day. Ask us to help you get up, so you don't fall.      Taking any blood thinning medicines  your doctor orders.        Page 1 of 2     [facility]; 3/23   Ways you can help prevent blood clots at home       Wearing compression stockings - if your doctor orders them. ? Walking - to help move the blood in your legs.       Taking any blood thinning medicines your doctor orders.      Signs of a blood clot or PE      Tell your doctor or nurse know right away if you have of the problems listed below.    If you are at home, seek medical care right away. Call 911 for chest pain or problems breathing.               Signs of a blood clot (DVT) - such as pain,  swelling, redness or warmth in your arm or leg      Signs of a pulmonary embolism (PE) - such as chest     pain or feeling short of breath

## 2024-07-01 NOTE — ED PROVIDER NOTES
HPI   Chief Complaint   Patient presents with    Abnormal ECG     Pt referred by PAT Department, after discovered pt rhythm new onset afib. Pt denies any pain or discomfort. Pt rate is between 59-62. Pt also bp was elevated  at 190s systolic. Pt states he does not recall if he took his bp medication but certain he does not have a hx of Afib        73-year-old obese gentleman with a history of hypertension and hyperlipidemia.  He is a retired , and takes care of his wife who has dementia.    Patient was a preadmission testing for clearance of right knee surgery scheduled for next week.  On evaluation, he was found to be in atrial fibrillation.  Ventricular rate 56.  Patient is and has been completely asymptomatic.    He denies lightheadedness, dizziness, headache, chest pain, palpitations, shortness of breath, back pain, confusion.      History provided by:  Patient   used: No                        [place] Coma Scale Score: 15                     Patient History   Past Medical History:   Diagnosis Date    Hyperlipidemia     Hypertension     Other bursal cyst, other site     Synovial cyst of lumbar spine     Past Surgical History:   Procedure Laterality Date    MR HEAD ANGIO WO IV CONTRAST  8/1/2021    MR HEAD ANGIO WO IV CONTRAST [facility]    OTHER SURGICAL HISTORY  11/10/2022    Carpal tunnel surgery    OTHER SURGICAL HISTORY  11/10/2022    Tonsillectomy    OTHER SURGICAL HISTORY  11/10/2022    Excision melanoma     Family History   Problem Relation Name Age of Onset    Hypertension Mother      Other (Brain Tumor) Mother      Diabetes Mother      Other (Bladder Cancer) Father      Other (cyst on spine) Sister      Melanoma Brother       Social History     Tobacco Use    Smoking status: Former     Types: Cigarettes     Passive exposure: Past    Smokeless tobacco: Never   Vaping Use    Vaping status: Never Used   Substance Use Topics    Alcohol use: Never    Drug use:  Never       Physical Exam   ED Triage Vitals [07/01/24 1113]   Temperature Heart Rate Respirations BP   36.6 °C (97.9 °F) 68 20 (!) 174/92      Pulse Ox Temp src Heart Rate Source Patient Position   100 % -- Monitor --      BP Location FiO2 (%)     -- --       Physical Exam  Vitals and nursing note reviewed.   Constitutional:       Appearance: Normal appearance. He is obese.   HENT:      Head: Normocephalic.      Mouth/Throat:      Mouth: Mucous membranes are dry.   Eyes:      Extraocular Movements: Extraocular movements intact.      Conjunctiva/sclera: Conjunctivae normal.   Cardiovascular:      Rate and Rhythm: Normal rate and regular rhythm.      Heart sounds: Normal heart sounds.   Pulmonary:      Effort: Pulmonary effort is normal.      Breath sounds: Normal breath sounds.   Abdominal:      General: Bowel sounds are normal. There is distension.      Palpations: Abdomen is soft.   Musculoskeletal:      Comments: Lower extremity edema with varicose veins.   Neurological:      Mental Status: He is alert.         ED Course & MDM   Diagnoses as of 07/01/24 1421   Atrial fibrillation, unspecified type (Multi)   Primary hypertension   Elevated d-dimer       Medical Decision Making  You were seen today for new onset atrial fibrillation, recognized after an EKG in preadmission testing.  Your ventricular rate was 56.    You are given IV fluids and started on Eliquis.  This medication should be taken as 10 mg every 12 hours for 7 days as a loading dose,, followed by 5 mg every 12 hours.    A referral has been made to Johnson Champion MD, cardiology for full cardiac workup.  Patient is aware and is instructed to call Dr. Champion should he not hear from them in the next couple days.        Procedure  Procedures     Naga Brown MD  07/01/24 1211       Naga Brown MD  07/01/24 1422

## 2024-07-01 NOTE — CPM/PAT H&P
Patient is an alert and oriented  73 year old male scheduled for a  Open Total Knee Arthroplasty  on 7/22/2024 with Dr. Raphael for  Primary Osteoarthritis of right Knee.    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  Today the patient reports he has 3-4 sharp pain.  He states moving makes the pain worse.  He states he feels as if his knee wants to dislocate.  He states sitting helps.  He uses a cane for assistance with ambulation. He denies swelling or giving out of his knee.  He states sitting makes the pain better. He has not had any injections and is currently doing  physical therapy.    PMHX includes Hypertension, Hyperlipidemia.      Presents to [facility] today for perioperative risk stratification and optimization.      Review of Systems   Constitutional: Negative for chills, decreased appetite, diaphoresis, fever and malaise/fatigue.   Eyes:  Negative for blurred vision and double vision.   Cardiovascular:  Negative for chest pain, claudication, cyanosis, dyspnea on exertion, irregular heartbeat, leg swelling, near-syncope and palpitations.   Respiratory:  Negative for cough, hemoptysis, shortness of breath and wheezing.    Endocrine: Negative for cold intolerance, heat intolerance, polydipsia, polyphagia and polyuria.   Gastrointestinal:  Negative for abdominal pain, constipation, diarrhea, dysphagia, nausea and vomiting.   Genitourinary:  Negative for bladder incontinence, dysuria, hematuria, incomplete emptying, nocturia, pelvic pain and urgency.   Neurological:  Negative for headaches, light-headedness, paresthesias, sensory change and weakness.   Psychiatric/Behavioral:  Negative for altered mental status.       Vitals and nursing note reviewed.     Physical exam  Constitutional:       Appearance: Normal appearance. He is normal weight.   HENT:      Head: Normocephalic and atraumatic.      Mouth/Throat:      Mouth: Mucous membranes are moist.      Pharynx: Oropharynx is clear.    Eyes:      Extraocular Movements: Extraocular movements intact.      Conjunctiva/sclera: Conjunctivae normal.      Pupils: Pupils are equal, round, and reactive to light.   Cardiovascular:      Rate and Rhythm: Normal rate and regular rhythm.      Pulses: Normal pulses.      Heart sounds: Normal heart sounds.      No audible carotid bruit  Pulmonary:      Effort: Pulmonary effort is normal.      Breath sounds: Normal breath sounds.   Abdominal:      General: Abdomen is flat. Bowel sounds are normal.      Palpations: Abdomen is soft.   Musculoskeletal:      Cervical back: Normal range of motion and neck supple.   Skin:     General: Skin is warm and dry.      Capillary Refill: Capillary refill takes less than 2 seconds.   Neurological:      General: No focal deficit present.      Mental Status: He is alert and oriented to person, place, and time. Mental status is at baseline.   Psychiatric:         Mood and Affect: Mood normal.         Behavior: Behavior normal.         Thought Content: Thought content normal.         Judgment: Judgment normal.     Vitals and nursing note reviewed. Physical exam within normal limits.   Vitals:    07/01/24 1012   BP: (!) 180/97   Pulse: 72   Resp: 18   Temp: 36.6 °C (97.9 °F)   SpO2: 99%   Repeat bp sitting right arm 150/90  Assessment & Plan:    Neuro:  No diagnosis or significant findings on chart review or clinical presentation and evaluation.     HEENT/Airway:  No diagnosis or significant findings on chart review or clinical presentation and evaluation.   STOP-BANG Score 6    Mallampati::  IV    TM distance::  >3 FB    Neck ROM::  Full  Mouth opening 3  Has dental crowns - reports one of his crowns fell off last night - will be calling his dentist today    Cardiovascular:  Hypertension  Managed with losartan 100mg daily  Managed with metoprolol 50mg daily    Hyperlipidemia  Managed with atorvastatin 10mg daily    New Onset Atrial Fib   Found in PAT EKG on 7/1/2024  Patient  asymptomatic    METS: 7.01  RCRI: 0 points, 3.9%  risk for postoperative MACE   ANNA: 0.2% risk for postoperative MACE  EKG - atrial fib rate of 59 2nd EKG shows A fib rate of 62 - new onset    Pulmonary:  No diagnosis or significant findings on chart review or clinical presentation and evaluation.   ARISCAT: <26 points, 1.6% risk of in-hospital postoperative pulmonary complication  PRODIGY: High risk for opioid induced respiratory depression  Smoking History-quit 30+ years ago smoked 1 ppd x 10 days   deep breathing handout given    Renal:  No diagnosis or significant findings on chart review or clinical presentation and evaluation, however, the patient is at increased risk of perioperative renal complications secondary to age>/= 56, male sex, and HTN. Preventative measures include  CMP 6/18/2024 stable    Endocrine:  No diagnosis or significant findings on chart review or clinical presentation and evaluation.     Hematologic:  CBC ordered   Caprini Score 7, patient at High risk for postoperative DVT. Pt supplied education/VTE handout    Gastrointestinal:   No diagnosis or significant findings on chart review or clinical presentation and evaluation.   Alcohol use-none  Drug use-none    Infectious disease:   No diagnosis or significant findings on chart review or clinical presentation and evaluation.   Chlorhexidine 0.12% sent to the pharmacy for the patient  Staph screen collected by nursing    Musculoskeletal:   Primary Osteoarthritis of Right Knee  Open total knee arthroplasty  JHFRAT score 8 moderate falls risk    Anesthesia:  No anesthesia complications  Family history of anesthesia complications none    Labs & Imaging ordered:  CBC, A1C, Type & Screen,  MRSA, EKG  CMP 6/18/2024  Nickel/metal allergy-none  Shellfish allergy-none    Report called to Dr. Brown - patient in a fib rate of 58-62 w/ elevated bp.  Report called to the er nurse    Overall, patient at low risk for the scheduled surgery. Discussed  with patient medication instructions, NPO guidelines, and any questions or concerns. Patient needs no further workup prior to preceding with elective surgery.  Patient will need cardiac clearance prior to surgery    Face to face time-30 minutes  Juana Raphael notified patient will need to be rescheduled needs further cardiac work up per cardiology on 7/8/2024    Mr. Garcia is a pleasant 73 year old  male with a past medical history significant for hypertension, hyperlipidemia and obesity. He presents today to establish cardiology care and obtain clearance prior to right total knee replacement. He was found to be in a-fib 7/1/24 during pre admission testing and was started on eliquis. He reports compliance with eliquis and denies missing any doses. His EKG shows he remains in a-fib. Other than some fatigue, he remains unaware of being in a-fib. In hopes to restore NSR, I will set him up for a cardioversion in 3 weeks, once he has been on eliquis for about a month. I will schedule him for an echocardiogram to assess his LV function, and to ensure we are not dealing with any significant valvular disease that may be contributing to his a-fib. I will also have him start amlodipine 5 mg once daily for better BP control. He will follow up with us in clinic post cardioversion. Unfortunately we will have to postpone his knee replacement surgery. He knows to call for any concerns.

## 2024-07-02 LAB
ATRIAL RATE: 49 BPM
Q ONSET: 210 MS
QRS COUNT: 8 BEATS
QRS DURATION: 92 MS
QT INTERVAL: 436 MS
QTC CALCULATION(BAZETT): 420 MS
QTC FREDERICIA: 426 MS
R AXIS: 3 DEGREES
T AXIS: 28 DEGREES
T OFFSET: 428 MS
VENTRICULAR RATE: 56 BPM

## 2024-07-03 PROBLEM — M79.671 RIGHT FOOT PAIN: Status: ACTIVE | Noted: 2024-07-03

## 2024-07-03 PROBLEM — H53.8 OTHER VISUAL DISTURBANCES: Status: ACTIVE | Noted: 2021-08-06

## 2024-07-03 PROBLEM — R40.1 CLOUDED CONSCIOUSNESS: Status: ACTIVE | Noted: 2024-07-03

## 2024-07-03 PROBLEM — M79.609 PAIN IN EXTREMITY: Status: ACTIVE | Noted: 2024-07-03

## 2024-07-03 PROBLEM — D03.59: Status: ACTIVE | Noted: 2020-09-29

## 2024-07-03 PROBLEM — M20.5X1 ACQUIRED HALLUX LIMITUS OF BOTH FEET: Status: ACTIVE | Noted: 2024-07-03

## 2024-07-03 PROBLEM — M20.5X2 ACQUIRED HALLUX LIMITUS OF BOTH FEET: Status: ACTIVE | Noted: 2024-07-03

## 2024-07-03 PROBLEM — M71.38 SYNOVIAL CYST OF LUMBAR SPINE: Status: ACTIVE | Noted: 2024-07-03

## 2024-07-03 PROBLEM — M25.569 KNEE PAIN: Status: ACTIVE | Noted: 2024-07-03

## 2024-07-03 PROBLEM — M62.81 MUSCLE WEAKNESS (GENERALIZED): Status: ACTIVE | Noted: 2021-08-06

## 2024-07-03 PROBLEM — I73.9 PERIPHERAL VASCULAR DISEASE (CMS-HCC): Status: ACTIVE | Noted: 2024-07-03

## 2024-07-03 LAB — STAPHYLOCOCCUS SPEC CULT: NORMAL

## 2024-07-08 ENCOUNTER — OFFICE VISIT (OUTPATIENT)
Dept: CARDIOLOGY | Facility: CLINIC | Age: 74
End: 2024-07-08
Payer: MEDICARE

## 2024-07-08 VITALS
HEART RATE: 88 BPM | DIASTOLIC BLOOD PRESSURE: 80 MMHG | BODY MASS INDEX: 36.3 KG/M2 | SYSTOLIC BLOOD PRESSURE: 175 MMHG | OXYGEN SATURATION: 99 % | RESPIRATION RATE: 20 BRPM | WEIGHT: 268 LBS | HEIGHT: 72 IN

## 2024-07-08 DIAGNOSIS — R06.02 SHORTNESS OF BREATH: ICD-10-CM

## 2024-07-08 DIAGNOSIS — I48.91 ATRIAL FIBRILLATION, UNSPECIFIED TYPE (MULTI): Primary | ICD-10-CM

## 2024-07-08 DIAGNOSIS — I10 PRIMARY HYPERTENSION: ICD-10-CM

## 2024-07-08 DIAGNOSIS — E78.2 MIXED HYPERLIPIDEMIA: ICD-10-CM

## 2024-07-08 PROCEDURE — 99215 OFFICE O/P EST HI 40 MIN: CPT | Performed by: NURSE PRACTITIONER

## 2024-07-08 PROCEDURE — 99205 OFFICE O/P NEW HI 60 MIN: CPT | Performed by: NURSE PRACTITIONER

## 2024-07-08 PROCEDURE — 1160F RVW MEDS BY RX/DR IN RCRD: CPT | Performed by: NURSE PRACTITIONER

## 2024-07-08 PROCEDURE — 1036F TOBACCO NON-USER: CPT | Performed by: NURSE PRACTITIONER

## 2024-07-08 PROCEDURE — 93010 ELECTROCARDIOGRAM REPORT: CPT | Performed by: INTERNAL MEDICINE

## 2024-07-08 PROCEDURE — 1123F ACP DISCUSS/DSCN MKR DOCD: CPT | Performed by: NURSE PRACTITIONER

## 2024-07-08 PROCEDURE — 3077F SYST BP >= 140 MM HG: CPT | Performed by: NURSE PRACTITIONER

## 2024-07-08 PROCEDURE — 1159F MED LIST DOCD IN RCRD: CPT | Performed by: NURSE PRACTITIONER

## 2024-07-08 PROCEDURE — 3079F DIAST BP 80-89 MM HG: CPT | Performed by: NURSE PRACTITIONER

## 2024-07-08 PROCEDURE — 1126F AMNT PAIN NOTED NONE PRSNT: CPT | Performed by: NURSE PRACTITIONER

## 2024-07-08 PROCEDURE — 93005 ELECTROCARDIOGRAM TRACING: CPT | Performed by: NURSE PRACTITIONER

## 2024-07-08 RX ORDER — AMLODIPINE BESYLATE 5 MG/1
5 TABLET ORAL DAILY
Qty: 30 TABLET | Refills: 11 | Status: SHIPPED | OUTPATIENT
Start: 2024-07-08 | End: 2025-07-08

## 2024-07-08 ASSESSMENT — PATIENT HEALTH QUESTIONNAIRE - PHQ9
SUM OF ALL RESPONSES TO PHQ9 QUESTIONS 1 AND 2: 0
2. FEELING DOWN, DEPRESSED OR HOPELESS: NOT AT ALL
1. LITTLE INTEREST OR PLEASURE IN DOING THINGS: NOT AT ALL

## 2024-07-08 ASSESSMENT — PAIN SCALES - GENERAL: PAINLEVEL: 0-NO PAIN

## 2024-07-08 NOTE — PROGRESS NOTES
Subjective   Tiot Garcia is a 73 y.o. male.    Chief Complaint:  Hypertension and Atrial Fibrillation    Mr. Garcia presents to establish cardiology care. He comes in today for clearance prior to right total knee replacement and new onset a-fib.     Mr. Garcia has a past medical history significant for hypertension, hyperlipidemia, arthritis and obesity.    He denies any history of MI, CVA, heart murmur, palpitations, syncope, claudication type pain, problematic swelling or family history of premature CAD. He denies ever seeing a cardiologist before and denies any prior cardiac testing.     He presents today for clearance prior to right total knee replacement scheduled for 7/22/24. He is seen today in collaboration with Dr. Champion. He was seen in pre admission testing on 7/1/24 and was found to be in atrial fibrillation. He was sent to the ER at that time where he was started on eliquis. He reports compliance with eliquis and denies missing any doses. He can not feel any palpitations, though has noticed more fatigue over the past few weeks. He seems to be getting around reasonably well, other than knee pain. He remains active on a stationary bike, denying any exertional chest pain or shortness of breath. He offers no specific cardiovascular complaints or concerns today. He denies any complaints of chest pain, shortness of breath, lightheadedness, dizziness, palpitations, syncope, orthopnea, paroxysmal nocturnal dyspnea, lower extremity swelling or bleeding concerns.     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 No children.    Surgical history: carpal tunnel surgery, tonsillectomy, lumbar laminectomy          Review of Systems   All other systems reviewed and are negative.      Objective   Physical Exam  Constitutional:        Appearance: Healthy appearance. In no distress  Pulmonary:      Effort: Pulmonary effort is normal.      Breath sounds: Normal breath sounds.   Cardiovascular:      Normal rate. Irregularly irregular rhythm. Normal S1. Normal S2.       Murmurs: There is no murmur.      Carotids: right carotid pulse +2, no bruit heard over the right carotid. left carotid pulse +2, no bruit heard over the left carotid.  Edema:     Peripheral edema absent.   Abdominal:      Palpations: Abdomen is soft.   Musculoskeletal:       Cervical back: Normal range of motion.   Skin:     General: Skin is warm and dry. Normal color and pigmentation   Neurological:      Mental Status: Alert and oriented to person, place and time.   Psychiatric:     Mood and Affect: appropriate mood and appropriate affect.     EKG obtained and reviewed. Atrial fibrillation. HR 60    Lab Review:   Lab Results   Component Value Date     07/01/2024    K 4.0 07/01/2024     07/01/2024    CO2 24 07/01/2024    BUN 10 07/01/2024    CREATININE 0.81 07/01/2024    GLUCOSE 107 (H) 07/01/2024    CALCIUM 9.7 07/01/2024     Lab Results   Component Value Date    WBC 7.1 07/01/2024    HGB 14.2 07/01/2024    HCT 42.5 07/01/2024    MCV 86 07/01/2024     07/01/2024     Lab Results   Component Value Date    CHOL 160 06/18/2024    TRIG 63 06/18/2024    HDL 45.0 06/18/2024       Assessment/Plan   Mr. Garcia is a pleasant 73 year old  male with a past medical history significant for hypertension, hyperlipidemia and obesity. He presents today to establish cardiology care and obtain clearance prior to right total knee replacement. He was found to be in a-fib 7/1/24 during pre admission testing and was started on eliquis. He reports compliance with eliquis and denies missing any doses. His EKG shows he remains in a-fib. Other than some fatigue, he remains unaware of being in a-fib. In hopes to restore NSR, I will set him up for a cardioversion in 3 weeks, once he  has been on eliquis for about a month. I will schedule him for an echocardiogram to assess his LV function, and to ensure we are not dealing with any significant valvular disease that may be contributing to his a-fib. I will also have him start amlodipine 5 mg once daily for better BP control. He will follow up with us in clinic post cardioversion. Unfortunately we will have to postpone his knee replacement surgery. He knows to call for any concerns.

## 2024-07-08 NOTE — PATIENT INSTRUCTIONS
Schedule an echo     Start amlodipine 5 mg daily     We will schedule you for a cardioversion in 3 weeks     Call for any concerns

## 2024-07-09 ENCOUNTER — TELEPHONE (OUTPATIENT)
Dept: CARDIOLOGY | Facility: CLINIC | Age: 74
End: 2024-07-09
Payer: MEDICARE

## 2024-07-09 LAB
ATRIAL RATE: 61 BPM
Q ONSET: 209 MS
QRS COUNT: 10 BEATS
QRS DURATION: 96 MS
QT INTERVAL: 420 MS
QTC CALCULATION(BAZETT): 420 MS
QTC FREDERICIA: 420 MS
R AXIS: -6 DEGREES
T AXIS: 23 DEGREES
T OFFSET: 419 MS
VENTRICULAR RATE: 60 BPM

## 2024-07-17 ENCOUNTER — HOSPITAL ENCOUNTER (OUTPATIENT)
Dept: CARDIOLOGY | Facility: CLINIC | Age: 74
Discharge: HOME | End: 2024-07-17
Payer: MEDICARE

## 2024-07-17 DIAGNOSIS — R06.02 SHORTNESS OF BREATH: ICD-10-CM

## 2024-07-17 DIAGNOSIS — I48.91 ATRIAL FIBRILLATION, UNSPECIFIED TYPE (MULTI): ICD-10-CM

## 2024-07-17 LAB
AORTIC VALVE PEAK VELOCITY: 1.21 M/S
AV PEAK GRADIENT: 5.8 MMHG
AVA (PEAK VEL): 3.3 CM2
EJECTION FRACTION APICAL 4 CHAMBER: 53.6
EJECTION FRACTION: 58 %
LEFT ATRIUM VOLUME AREA LENGTH INDEX BSA: 50.3 ML/M2
LEFT VENTRICULAR OUTFLOW TRACT DIAMETER: 2.37 CM
MITRAL VALVE E/E' RATIO: 7.62
RIGHT VENTRICLE FREE WALL PEAK S': 9.62 CM/S
RIGHT VENTRICLE PEAK SYSTOLIC PRESSURE: 37.5 MMHG
TRICUSPID ANNULAR PLANE SYSTOLIC EXCURSION: 1.9 CM

## 2024-07-17 PROCEDURE — 93306 TTE W/DOPPLER COMPLETE: CPT

## 2024-07-17 PROCEDURE — 93306 TTE W/DOPPLER COMPLETE: CPT | Performed by: INTERNAL MEDICINE

## 2024-07-29 NOTE — H&P
History Of Present Illness  Tito Garcia is a 73 y.o. male presenting with atrial fibrillation. PMH includes HTN, HLD, obesity. Patient denies missing any doses of eliquis in the past 30 days    Past Medical History:  Past Medical History:   Diagnosis Date    Hyperlipidemia     Hypertension     Other bursal cyst, other site     Synovial cyst of lumbar spine        Past Surgical History:  Past Surgical History:   Procedure Laterality Date    MR HEAD ANGIO WO IV CONTRAST  8/1/2021    MR HEAD ANGIO WO IV CONTRAST [facility]    OTHER SURGICAL HISTORY  11/10/2022    Carpal tunnel surgery    OTHER SURGICAL HISTORY  11/10/2022    Tonsillectomy    OTHER SURGICAL HISTORY  11/10/2022    Excision melanoma          Social History:   reports that he has quit smoking. His smoking use included cigarettes. He has been exposed to tobacco smoke. He has never used smokeless tobacco. He reports that he does not drink alcohol and does not use drugs.     Family History:  Family History   Problem Relation Name Age of Onset    Hypertension Mother      Other (Brain Tumor) Mother      Diabetes Mother      Other (Bladder Cancer) Father      Other (cyst on spine) Sister      Melanoma Brother          Allergies:  No Known Allergies     Home Medications:  Current Outpatient Medications   Medication Instructions    alpha tocopherol (Vitamin E) 670 mg (1,000 unit) capsule 1 capsule, oral, Daily    amLODIPine (NORVASC) 5 mg, oral, Daily    apixaban (Eliquis) 5 mg (74 tabs) tablet Take 2 tablets (10 mg) by mouth 2 times a day for 7 days, then take 1 tablet (5 mg) by mouth 2 times a day.    ascorbic acid (Vitamin C) 1,000 mg tablet 1 tablet, oral, Daily    atorvastatin (LIPITOR) 10 mg, oral, Daily    cyanocobalamin (VITAMIN B-12) 250 mcg, oral, Daily    flaxseed oiL 1,000 mg capsule 1 capsule, oral, Every 24 hours    glucosam-chond-hyalu-CF borate (Move Free CBA PHARMA) 750 mg-100 mg- 1.65 mg-108 mg tablet oral    losartan (COZAAR) 100  mg, oral, Daily    metoprolol succinate XL (Toprol-XL) 50 mg 24 hr tablet 1 tablet, oral, Daily, For 90 days    multivitamin tablet 1 tablet, oral, Daily    TURMERIC ORAL oral       Inpatient Medications:  Scheduled medications   Medication Dose Route Frequency     PRN medications   Medication     Continuous Medications   Medication Dose Last Rate    sodium chloride 0.9%  75 mL/hr           Review of Systems   Constitutional:  Positive for fatigue.   HENT: Negative.     Eyes: Negative.    Respiratory:  Positive for shortness of breath (on exertion).    Cardiovascular: Negative.    Gastrointestinal: Negative.    Endocrine: Negative.    Genitourinary: Negative.    Musculoskeletal: Negative.    Skin: Negative.    Allergic/Immunologic: Negative.    Neurological: Negative.    Hematological: Negative.    Psychiatric/Behavioral: Negative.            Physical Exam  Constitutional:       General: He is awake. He is not in acute distress.     Appearance: He is not ill-appearing.   Cardiovascular:      Rate and Rhythm: Bradycardia present. Rhythm irregular.      Pulses: Normal pulses.           Radial pulses are 2+ on the right side and 2+ on the left side.        Dorsalis pedis pulses are 2+ on the right side and 2+ on the left side.      Heart sounds: Normal heart sounds. No murmur heard.  Pulmonary:      Effort: Pulmonary effort is normal.      Breath sounds: Normal breath sounds and air entry.   Abdominal:      General: Bowel sounds are normal.      Palpations: Abdomen is soft.      Tenderness: There is no abdominal tenderness.   Musculoskeletal:      Right lower leg: No edema.      Left lower leg: No edema.   Skin:     General: Skin is warm and dry.   Neurological:      General: No focal deficit present.      Mental Status: He is alert and oriented to person, place, and time.      GCS: GCS eye subscore is 4. GCS verbal subscore is 5. GCS motor subscore is 6.   Psychiatric:         Mood and Affect: Mood normal.          "Behavior: Behavior is cooperative.        Sedation Plan    ASA 3     Mallampati class: III.         Last Recorded Vitals  Blood pressure 179/87, pulse 60, temperature 36 °C (96.8 °F), temperature source Tympanic, resp. rate 18, SpO2 96%.         Vitals from the Past 24 Hours  Heart Rate:  [60]   Temp:  [36 °C (96.8 °F)]   Resp:  [18]   BP: (179)/(87)   SpO2:  [96 %]          Relevant Results    Labs    CBC:   Recent Labs     07/01/24  1124 08/06/21  0406 08/04/21  1135 08/02/21  0352 08/01/21  0414 07/31/21  1239   WBC 7.1 6.2 6.7 5.9 6.4 5.0   HGB 14.2 13.5 14.7 14.0 14.0 14.9   HCT 42.5 39.3* 42.1 40.9* 40.6* 42.1    141* 163 162 192 224   MCV 86 95.2 92.3 92.7 92.9 90.1     BMP/CMP:   Recent Labs     07/01/24  1124 06/18/24  0856 12/13/23  0743 06/13/23  0832 12/09/22  0942 06/01/22  0831 08/03/21  0336 08/02/21  0352    139 136 134 135 138   < > 133   K 4.0 4.4 4.8 5.0 4.7 5.4*   < > 4.1    101 100 98 99 102   < > 96*   BUN 10 10 10 11 9 11   < > 7*   CREATININE 0.81 1.00 0.90 0.8 0.9 1.0   < > 0.7   CO2 24 25 24 21* 24 23*   < > 21*   CALCIUM 9.7 9.5 9.3 9.6 9.6 9.9   < > 8.9   PROT 7.6 7.3  --  7.4 7.2 7.2  --  6.9   BILITOT 0.9 0.8  --  1.0 0.6 0.7  --  1.0   ALKPHOS 72 83  --  73 67 78  --  67   ALT 20 18  --  23 23 19  --  28   AST 28 31  --  32 38 31  --  45*   GLUCOSE 107* 106* 88 92 106* 114*   < > 108*    < > = values in this interval not displayed.      Lipid Panel:   Recent Labs     06/18/24  0856 06/13/23  0832 12/09/22  0942 06/01/22  0831 08/01/21  0414 06/27/19  0806 02/28/18  0821   CHOL 160 148 148 150 209* 199 223*   HDL 45.0 44 48 47 96 58 73   CHHDL 3.6 3.4 3.1 3.2 2.2 3.4 3.1   TRIG 63 71 76 71 41 76 58     Cardiac       No lab exists for component: \"CK\", \"CKMBP\"   Hemoglobin A1C: No results for input(s): \"HGBA1C\" in the last 60665 hours.  TSH/ Free T4:   Recent Labs     08/01/21  1402 07/05/20  0235 06/14/20  1451 05/28/20  1203   TSH 2.59 1.83 1.51 1.09   FREET4  --   -- " " 1.2  --      Iron: No results for input(s): \"FERRITIN\", \"TIBC\", \"IRONSAT\", \"BNP\" in the last 34939 hours.  Coag:     ABO: No results found for: \"ABO\"    Past Cardiology Tests (Last 3 Years):    EKG:  Recent Labs     07/08/24  1301 07/01/24  1125 07/01/24  1000   ATRRATE 61 49 208   VENTRATE 60 56 62   QRSDUR 96 92 94   QTCFRED 420 426 430   QTCCALCB 420 420 432     Encounter Date: 07/08/24   ECG 12 lead (Clinic Performed)   Result Value    Ventricular Rate 60    Atrial Rate 61    QRS Duration 96    QT Interval 420    QTC Calculation(Bazett) 420    R Axis -6    T Axis 23    QRS Count 10    Q Onset 209    T Offset 419    QTC Fredericia 420    Narrative    Atrial fibrillation  Abnormal ECG  When compared with ECG of 01-JUL-2024 11:16,  No significant change was found  Confirmed by Johnson Champion ([id number]) on 7/9/2024 12:42:53 PM     Echo:  Echocardiogram:   Transthoracic Echo (TTE) Complete 07/17/2024    PHYSICIAN INTERPRETATION:  Left Ventricle: The left ventricular systolic function is normal, with a visually estimated ejection fraction of 55-60%. There are no regional wall motion abnormalities. The left ventricular cavity size is normal. Spectral Doppler shows a normal pattern of left ventricular diastolic filling.  Left Atrium: The left atrium is moderate to severely dilated.  Right Ventricle: The right ventricle is normal in size. There is normal right ventriclar wall thickness. There is normal right ventricular global systolic function.  Right Atrium: The right atrium is moderately to severely dilated.  Aortic Valve: The aortic valve appears structurally normal. The aortic valve appears tricuspid and non-restricted. There is no evidence of aortic valve regurgitation. The peak instantaneous gradient of the aortic valve is 5.8 mmHg.  Mitral Valve: The mitral valve is normal in structure. There is normal mitral valve leaflet mobility. There is mild mitral valve regurgitation.  Tricuspid Valve: The tricuspid valve " is structurally normal. There is normal tricuspid valve leaflet mobility. There is mild tricuspid regurgitation. The Doppler estimated RVSP is slightly elevated at 37.5 mmHg.  Pulmonic Valve: The pulmonic valve is structurally normal. There is trace pulmonic valve regurgitation.  Pericardium: There is no pericardial effusion noted.  Aorta: The aortic root is abnormal. The Asc Ao is 3.90 cm. There is no dilatation of the aortic arch. There is mild dilatation of the ascending aorta. There is mild dilatation of the aortic root.  Pulmonary Artery: The pulmonary artery is normal in size. The tricuspid regurgitant velocity is 2.72 m/s, and with an estimated right atrial pressure of 8 mmHg, the estimated pulmonary artery pressure is borderline elevated with the RVSP at 37.5 mmHg. The estimated PASP is 38 mmHg.  Systemic Veins: The inferior vena cava appears moderately dilated. There is IVC inspiratory collapse greater than 50%.      CONCLUSIONS:  1. The left ventricular systolic function is normal, with a visually estimated ejection fraction of 55-60%.  2. There is normal right ventricular global systolic function.  3. The left atrium is moderate to severely dilated.  4. The right atrium is moderately to severely dilated.  5. Mild mitral valve regurgitation.  6. Mild tricuspid regurgitation visualized.  7. Slightly elevated RVSP.  8. The estimated PASP is 38 mmHg.  9. The inferior vena cava appears moderately dilated.      Ejection Fractions:  LV EF   Date/Time Value Ref Range Status   07/17/2024 09:20 AM 58 %      Cath:  Coronary Angiography: No results found for this or any previous visit from the past 1800 days.    Right Heart Cath: No results found for this or any previous visit from the past 1800 days.    Stress Test:  Nuclear:No results found for this or any previous visit from the past 1800 days.    Metabolic Stress: No results found for this or any previous visit from the past 1800 days.    Cardiac  Imaging:  Cardiac Scoring: No results found for this or any previous visit from the past 1800 days.    Cardiac MRI: No results found for this or any previous visit from the past 1800 days.         Assessment/Plan  Assessment/Plan   Active Problems:  There are no active Hospital Problems.  Atrial fibrillation  -planned DCCV with Dr. Champion on 7/30/24       NP discussed with Dr. Champion regarding plan of care/ discharge plan      I spent 30 minutes in the professional and overall care of this patient.      Stephan Rogers, APRN-CNP, DNP

## 2024-07-30 ENCOUNTER — HOSPITAL ENCOUNTER (OUTPATIENT)
Dept: CARDIOLOGY | Facility: HOSPITAL | Age: 74
Discharge: HOME | End: 2024-07-30
Payer: MEDICARE

## 2024-07-30 ENCOUNTER — ANESTHESIA EVENT (OUTPATIENT)
Dept: CARDIOLOGY | Facility: HOSPITAL | Age: 74
End: 2024-07-30
Payer: MEDICARE

## 2024-07-30 ENCOUNTER — ANESTHESIA (OUTPATIENT)
Dept: CARDIOLOGY | Facility: HOSPITAL | Age: 74
End: 2024-07-30
Payer: MEDICARE

## 2024-07-30 VITALS
HEART RATE: 58 BPM | DIASTOLIC BLOOD PRESSURE: 78 MMHG | RESPIRATION RATE: 18 BRPM | TEMPERATURE: 96.8 F | OXYGEN SATURATION: 96 % | SYSTOLIC BLOOD PRESSURE: 150 MMHG

## 2024-07-30 DIAGNOSIS — Z86.79 STATUS POST RADIOFREQUENCY ABLATION (RFA) OPERATION FOR ARRHYTHMIA: ICD-10-CM

## 2024-07-30 DIAGNOSIS — Z98.890 STATUS POST RADIOFREQUENCY ABLATION (RFA) OPERATION FOR ARRHYTHMIA: ICD-10-CM

## 2024-07-30 DIAGNOSIS — I48.91 ATRIAL FIBRILLATION (MULTI): Primary | ICD-10-CM

## 2024-07-30 DIAGNOSIS — I48.91 ATRIAL FIBRILLATION, UNSPECIFIED TYPE (MULTI): ICD-10-CM

## 2024-07-30 DIAGNOSIS — I48.91 A-FIB (MULTI): ICD-10-CM

## 2024-07-30 LAB
ATRIAL RATE: 79 BPM
Q ONSET: 211 MS
QRS COUNT: 10 BEATS
QRS DURATION: 98 MS
QT INTERVAL: 428 MS
QTC CALCULATION(BAZETT): 455 MS
QTC FREDERICIA: 446 MS
R AXIS: 0 DEGREES
T AXIS: 11 DEGREES
T OFFSET: 425 MS
VENTRICULAR RATE: 68 BPM

## 2024-07-30 PROCEDURE — A92960 PR CARDIOVERSION, ELECTIVE;EXTERN: Performed by: ANESTHESIOLOGY

## 2024-07-30 PROCEDURE — 92960 CARDIOVERSION ELECTRIC EXT: CPT | Performed by: INTERNAL MEDICINE

## 2024-07-30 PROCEDURE — 2500000004 HC RX 250 GENERAL PHARMACY W/ HCPCS (ALT 636 FOR OP/ED)

## 2024-07-30 PROCEDURE — 99100 ANES PT EXTEME AGE<1 YR&>70: CPT | Performed by: ANESTHESIOLOGY

## 2024-07-30 PROCEDURE — 3700000001 HC GENERAL ANESTHESIA TIME - INITIAL BASE CHARGE

## 2024-07-30 PROCEDURE — 7100000010 HC PHASE TWO TIME - EACH INCREMENTAL 1 MINUTE

## 2024-07-30 PROCEDURE — A92960 PR CARDIOVERSION, ELECTIVE;EXTERN

## 2024-07-30 PROCEDURE — 93010 ELECTROCARDIOGRAM REPORT: CPT | Performed by: INTERNAL MEDICINE

## 2024-07-30 PROCEDURE — 3700000002 HC GENERAL ANESTHESIA TIME - EACH INCREMENTAL 1 MINUTE

## 2024-07-30 PROCEDURE — 7100000009 HC PHASE TWO TIME - INITIAL BASE CHARGE

## 2024-07-30 PROCEDURE — 93005 ELECTROCARDIOGRAM TRACING: CPT | Mod: 59

## 2024-07-30 PROCEDURE — 92960 CARDIOVERSION ELECTRIC EXT: CPT | Mod: XP

## 2024-07-30 RX ORDER — OXYCODONE HYDROCHLORIDE 5 MG/1
5 TABLET ORAL EVERY 4 HOURS PRN
Status: DISCONTINUED | OUTPATIENT
Start: 2024-07-30 | End: 2024-07-31 | Stop reason: HOSPADM

## 2024-07-30 RX ORDER — SODIUM CHLORIDE, SODIUM LACTATE, POTASSIUM CHLORIDE, CALCIUM CHLORIDE 600; 310; 30; 20 MG/100ML; MG/100ML; MG/100ML; MG/100ML
INJECTION, SOLUTION INTRAVENOUS CONTINUOUS PRN
Status: DISCONTINUED | OUTPATIENT
Start: 2024-07-30 | End: 2024-07-30

## 2024-07-30 RX ORDER — PROMETHAZINE HYDROCHLORIDE 25 MG/ML
6.25 INJECTION, SOLUTION INTRAMUSCULAR; INTRAVENOUS ONCE AS NEEDED
Status: DISCONTINUED | OUTPATIENT
Start: 2024-07-30 | End: 2024-07-31 | Stop reason: HOSPADM

## 2024-07-30 RX ORDER — LIDOCAINE HYDROCHLORIDE 10 MG/ML
0.1 INJECTION INFILTRATION; PERINEURAL ONCE
Status: DISCONTINUED | OUTPATIENT
Start: 2024-07-30 | End: 2024-07-31 | Stop reason: HOSPADM

## 2024-07-30 RX ORDER — SODIUM CHLORIDE 9 MG/ML
75 INJECTION, SOLUTION INTRAVENOUS CONTINUOUS
Status: DISCONTINUED | OUTPATIENT
Start: 2024-07-30 | End: 2024-07-30

## 2024-07-30 RX ORDER — ACETAMINOPHEN 325 MG/1
650 TABLET ORAL EVERY 4 HOURS PRN
Status: DISCONTINUED | OUTPATIENT
Start: 2024-07-30 | End: 2024-07-31 | Stop reason: HOSPADM

## 2024-07-30 RX ORDER — PROPOFOL 10 MG/ML
INJECTION, EMULSION INTRAVENOUS AS NEEDED
Status: DISCONTINUED | OUTPATIENT
Start: 2024-07-30 | End: 2024-07-30

## 2024-07-30 RX ORDER — SODIUM CHLORIDE, SODIUM LACTATE, POTASSIUM CHLORIDE, CALCIUM CHLORIDE 600; 310; 30; 20 MG/100ML; MG/100ML; MG/100ML; MG/100ML
100 INJECTION, SOLUTION INTRAVENOUS CONTINUOUS
Status: DISCONTINUED | OUTPATIENT
Start: 2024-07-30 | End: 2024-07-31 | Stop reason: HOSPADM

## 2024-07-30 ASSESSMENT — PAIN - FUNCTIONAL ASSESSMENT
PAIN_FUNCTIONAL_ASSESSMENT: 0-10

## 2024-07-30 ASSESSMENT — ENCOUNTER SYMPTOMS
GASTROINTESTINAL NEGATIVE: 1
HEMATOLOGIC/LYMPHATIC NEGATIVE: 1
EYES NEGATIVE: 1
CARDIOVASCULAR NEGATIVE: 1
NEUROLOGICAL NEGATIVE: 1
ENDOCRINE NEGATIVE: 1
MUSCULOSKELETAL NEGATIVE: 1
FATIGUE: 1
PSYCHIATRIC NEGATIVE: 1
ALLERGIC/IMMUNOLOGIC NEGATIVE: 1
SHORTNESS OF BREATH: 1

## 2024-07-30 ASSESSMENT — COLUMBIA-SUICIDE SEVERITY RATING SCALE - C-SSRS
6. HAVE YOU EVER DONE ANYTHING, STARTED TO DO ANYTHING, OR PREPARED TO DO ANYTHING TO END YOUR LIFE?: NO
2. HAVE YOU ACTUALLY HAD ANY THOUGHTS OF KILLING YOURSELF?: NO
1. IN THE PAST MONTH, HAVE YOU WISHED YOU WERE DEAD OR WISHED YOU COULD GO TO SLEEP AND NOT WAKE UP?: NO

## 2024-07-30 ASSESSMENT — PAIN SCALES - GENERAL
PAINLEVEL_OUTOF10: 0 - NO PAIN

## 2024-07-30 NOTE — DISCHARGE INSTRUCTIONS
Please do not miss any doses of your anticoagulant (Eliquis). This is very important to prevent the risk of stroke.     Please get EKG in 1 week (approximately 8/6/24)             Cardioversion     Cardioversion is a non-surgical way to restore your heart's normal rhythm. Medication may be tried first to correct an irregular heartbeat, but if medicines do not work, electrical cardioversion may be needed. The electrical current should make your heartbeat normally again.      After the procedure-    Your heart rate, blood pressure and respirations will be checked frequently by the nurse until you are fully alert.      When the patches are removed form your chest, you may notice redness, irritation, or itching similar to a mild sunburn. You may You may use over the counter hydrocortisone 1% cream and apply up to three times a day for any irritation to your skin on your chest.      Discharge information-   Have an adult with you to get you home from the hospital; you will not be allowed to drive for 24 hours after the procedure.      You may resume your usual routine after discharge.      Make sure you and your family understands how you are to take your medications. The doctor will tell you what to do with your cardiac medicines and your anti-coagulation medicines.      There is a small chance your irregular heartbeat may return. If you feel skipped beats, rapid heart rate, or chest tightness, call your doctor.

## 2024-07-30 NOTE — ANESTHESIA PREPROCEDURE EVALUATION
Patient: Tito Garcia    Procedure Information       Date/Time: 07/30/24 9969    Scheduled providers: Johnson Champion MD; Aguila Jj MD    Procedure: CARDIOVERSION EXTERNAL    Location: [facility]            Relevant Problems   Cardiac   (+) Atrial fibrillation (Multi)   (+) Mixed hyperlipidemia   (+) Peripheral vascular disease (CMS-HCC)   (+) Primary hypertension      Neuro  Insomnia     (+) Anxiety   (+) EMILY (generalized anxiety disorder)   (+) Major depressive disorder, recurrent severe without psychotic features (Multi)      /Renal   (+) Bilateral hydronephrosis   (+) Hyponatremia      Liver  EtOH dependence      Endocrine   (+) Obesity, morbid (Multi)      Musculoskeletal   (+) Degenerative joint disease of toe   (+) Lumbar degenerative disc disease   (+) Osteoarthritis of both hips   (+) Osteoarthritis of right knee   (+) Primary osteoarthritis of both knees   (+) Primary osteoarthritis of left knee      HEENT   (+) Chronic maxillary sinusitis       Clinical information reviewed:                   NPO Detail:  No data recorded     Physical Exam    Airway  Mallampati: II  TM distance: >3 FB  Neck ROM: full     Cardiovascular - normal exam     Dental    Pulmonary - normal exam     Abdominal - normal exam             Anesthesia Plan    History of general anesthesia?: yes  History of complications of general anesthesia?: no    ASA 3     MAC     intravenous induction   Anesthetic plan and risks discussed with patient.    Plan discussed with CAA.

## 2024-07-30 NOTE — ANESTHESIA POSTPROCEDURE EVALUATION
Patient: Tito Garcia    Procedure Summary       Date: 07/30/24 Room / Location: [facility]    Anesthesia Start: 0928 Anesthesia Stop: 0938    Procedure: CARDIOVERSION EXTERNAL Diagnosis: A-fib (Multi)    Scheduled Providers: Johnson Champion MD; Aguila Jj MD Responsible Provider: ONELIA Johansen    Anesthesia Type: MAC ASA Status: 3            Anesthesia Type: MAC    Vitals Value Taken Time   /67 07/30/24 0938   Temp 36 07/30/24 0938   Pulse 55 07/30/24 0938   Resp 12 07/30/24 0938   SpO2 98 07/30/24 0938       Anesthesia Post Evaluation    Patient location during evaluation: PACU  Patient participation: complete - patient participated  Level of consciousness: awake and alert  Pain management: satisfactory to patient  Airway patency: patent  Two or more strategies used to mitigate risk of obstructive sleep apnea  Cardiovascular status: acceptable and blood pressure returned to baseline  Respiratory status: acceptable and nasal cannula  Hydration status: acceptable  Postoperative Nausea and Vomiting: none      There were no known notable events for this encounter.

## 2024-07-30 NOTE — ANESTHESIA POSTPROCEDURE EVALUATION
Patient: Tito Garcia    Procedure Summary       Date: 07/30/24 Room / Location: [facility]    Anesthesia Start: 0928 Anesthesia Stop: 0938    Procedure: CARDIOVERSION EXTERNAL Diagnosis: A-fib (Multi)    Scheduled Providers: Johnson Champion MD; Aguila Jj MD Responsible Provider: Riley Almonte MD    Anesthesia Type: MAC ASA Status: 3            Anesthesia Type: MAC    Vitals Value Taken Time   /70 07/30/24 0948   Temp 37 07/30/24 1001   Pulse 58 07/30/24 0948   Resp 16 07/30/24 0948   SpO2 95 % 07/30/24 0948       Anesthesia Post Evaluation    Patient location during evaluation: PACU  Patient participation: complete - patient participated  Level of consciousness: awake  Pain management: adequate  Airway patency: patent  Cardiovascular status: acceptable  Respiratory status: acceptable  Hydration status: acceptable  Postoperative Nausea and Vomiting: none        There were no known notable events for this encounter.

## 2024-07-30 NOTE — Clinical Note
Patient ID band present and verified. Patient contact is in patient room. Contact(s) present: friend. Contact name: Genesis. Contact is permitted to be contacted to discuss results.

## 2024-07-30 NOTE — NURSING NOTE
Repeat EKG completed 1hr post CVN. NSR with 1st degree block. Dr. Champion notified, will see patient before he leaves.

## 2024-08-05 ENCOUNTER — HOSPITAL ENCOUNTER (OUTPATIENT)
Dept: CARDIOLOGY | Facility: CLINIC | Age: 74
Discharge: HOME | End: 2024-08-05
Payer: MEDICARE

## 2024-08-05 DIAGNOSIS — I48.91 ATRIAL FIBRILLATION (MULTI): ICD-10-CM

## 2024-08-05 DIAGNOSIS — Z98.890 STATUS POST RADIOFREQUENCY ABLATION (RFA) OPERATION FOR ARRHYTHMIA: ICD-10-CM

## 2024-08-05 DIAGNOSIS — Z86.79 STATUS POST RADIOFREQUENCY ABLATION (RFA) OPERATION FOR ARRHYTHMIA: ICD-10-CM

## 2024-08-05 PROCEDURE — 93005 ELECTROCARDIOGRAM TRACING: CPT

## 2024-08-05 PROCEDURE — 93010 ELECTROCARDIOGRAM REPORT: CPT | Performed by: INTERNAL MEDICINE

## 2024-08-06 ENCOUNTER — APPOINTMENT (OUTPATIENT)
Dept: CARDIOLOGY | Facility: CLINIC | Age: 74
End: 2024-08-06
Payer: MEDICARE

## 2024-08-08 LAB
ATRIAL RATE: 63 BPM
P AXIS: 40 DEGREES
P OFFSET: 192 MS
P ONSET: 122 MS
PR INTERVAL: 206 MS
Q ONSET: 225 MS
QRS COUNT: 10 BEATS
QRS DURATION: 98 MS
QT INTERVAL: 410 MS
QTC CALCULATION(BAZETT): 419 MS
QTC FREDERICIA: 416 MS
R AXIS: 32 DEGREES
T AXIS: 50 DEGREES
T OFFSET: 430 MS
VENTRICULAR RATE: 63 BPM

## 2024-08-19 ENCOUNTER — OFFICE VISIT (OUTPATIENT)
Dept: CARDIOLOGY | Facility: CLINIC | Age: 74
End: 2024-08-19
Payer: MEDICARE

## 2024-08-19 VITALS
OXYGEN SATURATION: 100 % | HEIGHT: 72 IN | HEART RATE: 83 BPM | BODY MASS INDEX: 36.1 KG/M2 | SYSTOLIC BLOOD PRESSURE: 163 MMHG | WEIGHT: 266.5 LBS | DIASTOLIC BLOOD PRESSURE: 85 MMHG

## 2024-08-19 DIAGNOSIS — E78.2 MIXED HYPERLIPIDEMIA: ICD-10-CM

## 2024-08-19 DIAGNOSIS — I48.0 PAROXYSMAL ATRIAL FIBRILLATION (MULTI): ICD-10-CM

## 2024-08-19 DIAGNOSIS — I10 PRIMARY HYPERTENSION: Primary | ICD-10-CM

## 2024-08-19 LAB
ATRIAL RATE: 67 BPM
P AXIS: 49 DEGREES
P OFFSET: 158 MS
P ONSET: 121 MS
PR INTERVAL: 178 MS
Q ONSET: 210 MS
QRS COUNT: 11 BEATS
QRS DURATION: 90 MS
QT INTERVAL: 402 MS
QTC CALCULATION(BAZETT): 424 MS
QTC FREDERICIA: 417 MS
R AXIS: -7 DEGREES
T AXIS: 12 DEGREES
T OFFSET: 411 MS
VENTRICULAR RATE: 67 BPM

## 2024-08-19 PROCEDURE — 99214 OFFICE O/P EST MOD 30 MIN: CPT | Performed by: NURSE PRACTITIONER

## 2024-08-19 PROCEDURE — 1123F ACP DISCUSS/DSCN MKR DOCD: CPT | Performed by: NURSE PRACTITIONER

## 2024-08-19 PROCEDURE — 1159F MED LIST DOCD IN RCRD: CPT | Performed by: NURSE PRACTITIONER

## 2024-08-19 PROCEDURE — 3079F DIAST BP 80-89 MM HG: CPT | Performed by: NURSE PRACTITIONER

## 2024-08-19 PROCEDURE — 1036F TOBACCO NON-USER: CPT | Performed by: NURSE PRACTITIONER

## 2024-08-19 PROCEDURE — 3008F BODY MASS INDEX DOCD: CPT | Performed by: NURSE PRACTITIONER

## 2024-08-19 PROCEDURE — 93010 ELECTROCARDIOGRAM REPORT: CPT | Performed by: INTERNAL MEDICINE

## 2024-08-19 PROCEDURE — 93005 ELECTROCARDIOGRAM TRACING: CPT | Performed by: NURSE PRACTITIONER

## 2024-08-19 PROCEDURE — 1160F RVW MEDS BY RX/DR IN RCRD: CPT | Performed by: NURSE PRACTITIONER

## 2024-08-19 PROCEDURE — 1126F AMNT PAIN NOTED NONE PRSNT: CPT | Performed by: NURSE PRACTITIONER

## 2024-08-19 PROCEDURE — 3077F SYST BP >= 140 MM HG: CPT | Performed by: NURSE PRACTITIONER

## 2024-08-19 ASSESSMENT — ENCOUNTER SYMPTOMS: HYPERTENSION: 1

## 2024-08-19 ASSESSMENT — PAIN SCALES - GENERAL: PAINLEVEL: 0-NO PAIN

## 2024-08-19 ASSESSMENT — COLUMBIA-SUICIDE SEVERITY RATING SCALE - C-SSRS
1. IN THE PAST MONTH, HAVE YOU WISHED YOU WERE DEAD OR WISHED YOU COULD GO TO SLEEP AND NOT WAKE UP?: NO
6. HAVE YOU EVER DONE ANYTHING, STARTED TO DO ANYTHING, OR PREPARED TO DO ANYTHING TO END YOUR LIFE?: NO
2. HAVE YOU ACTUALLY HAD ANY THOUGHTS OF KILLING YOURSELF?: NO

## 2024-08-19 ASSESSMENT — PATIENT HEALTH QUESTIONNAIRE - PHQ9
1. LITTLE INTEREST OR PLEASURE IN DOING THINGS: NOT AT ALL
2. FEELING DOWN, DEPRESSED OR HOPELESS: NOT AT ALL
SUM OF ALL RESPONSES TO PHQ9 QUESTIONS 1 AND 2: 0

## 2024-08-19 NOTE — PROGRESS NOTES
Subjective   Tito Garcia is a 73 y.o. male.    Chief Complaint:  Follow-up, Hypertension, and Atrial Fibrillation    Mr. Garcia returns for a routine follow up. He is seen in collaboration with Dr. Champion. He underwent successful cardioversion 7/30/24. He has noticed an improvement in his dyspnea since being back in NSR. His biggest issue and limitation continues to be knee pain. He offers no other cardiovascular complaints or concerns today. He denies any complaints of chest pain, shortness of breath, lightheadedness, dizziness, palpitations, syncope, orthopnea, paroxysmal nocturnal dyspnea, lower extremity swelling or bleeding concerns.      Hypertension    Atrial Fibrillation  Past medical history includes atrial fibrillation.       Review of Systems   All other systems reviewed and are negative.      Objective   Physical Exam  Constitutional:       Appearance: Healthy appearance. In no distress  Pulmonary:      Effort: Pulmonary effort is normal.      Breath sounds: Normal breath sounds.   Cardiovascular:      Normal rate. Regular rhythm. Normal S1. Normal S2.       Murmurs: There is no murmur.      Carotids: right carotid pulse +2, no bruit heard over the right carotid. left carotid pulse +2, no bruit heard over the left carotid.  Edema:     Peripheral edema absent.   Abdominal:      Palpations: Abdomen is soft.   Musculoskeletal:       Cervical back: Normal range of motion.   Skin:     General: Skin is warm and dry. Normal color and pigmentation   Neurological:      Mental Status: Alert and oriented to person, place and time.   Psychiatric:     Mood and Affect: appropriate mood and appropriate affect.     EKG obtained and reviewed. Normal sinus rhythm. HR 67      Lab Review:   Lab Results   Component Value Date     07/01/2024    K 4.0 07/01/2024     07/01/2024    CO2 24 07/01/2024    BUN 10 07/01/2024    CREATININE 0.81 07/01/2024    GLUCOSE 107 (H) 07/01/2024    CALCIUM 9.7 07/01/2024      Lab Results   Component Value Date    WBC 7.1 07/01/2024    HGB 14.2 07/01/2024    HCT 42.5 07/01/2024    MCV 86 07/01/2024     07/01/2024     Lab Results   Component Value Date    CHOL 160 06/18/2024    TRIG 63 06/18/2024    HDL 45.0 06/18/2024       Assessment/Plan   Mr. Garcia is a pleasant 73 year old  male with a past medical history significant for hypertension, hyperlipidemia, obesity and a-fib, diagnosed 7/1/24 s/p cardioversion 7/30/24 on eliquis anticoagulation. Echocardiogram 7/17/24 showed an EF of 55-60% with normal RVSP, moderately to severely dilated left and right atrium and mild TR. He presents today for routine follow up stable from a cardiac standpoint. His EKG remains stable. He remains in NSR. His BP is elevated, though he would like to start monitoring his BP at home before make any further adjustments. I will have him continue all medications unchanged. He will follow up with us in clinic in 6 weeks for a BP check. He should wait until at least November before proceeding with his knee replacement to safely hold eliquis. He knows to call for any concerns.

## 2024-09-30 ENCOUNTER — APPOINTMENT (OUTPATIENT)
Dept: CARDIOLOGY | Facility: CLINIC | Age: 74
End: 2024-09-30
Payer: MEDICARE

## 2024-09-30 VITALS
HEIGHT: 72 IN | SYSTOLIC BLOOD PRESSURE: 162 MMHG | OXYGEN SATURATION: 99 % | BODY MASS INDEX: 36.65 KG/M2 | DIASTOLIC BLOOD PRESSURE: 72 MMHG | HEART RATE: 70 BPM | WEIGHT: 270.6 LBS

## 2024-09-30 DIAGNOSIS — I10 PRIMARY HYPERTENSION: ICD-10-CM

## 2024-09-30 DIAGNOSIS — I48.91 ATRIAL FIBRILLATION, UNSPECIFIED TYPE (MULTI): Primary | ICD-10-CM

## 2024-09-30 LAB
ATRIAL RATE: 64 BPM
P AXIS: 41 DEGREES
P OFFSET: 182 MS
P ONSET: 122 MS
PR INTERVAL: 198 MS
Q ONSET: 221 MS
QRS COUNT: 11 BEATS
QRS DURATION: 92 MS
QT INTERVAL: 398 MS
QTC CALCULATION(BAZETT): 410 MS
QTC FREDERICIA: 406 MS
R AXIS: 39 DEGREES
T AXIS: 22 DEGREES
T OFFSET: 420 MS
VENTRICULAR RATE: 64 BPM

## 2024-09-30 PROCEDURE — 1159F MED LIST DOCD IN RCRD: CPT | Performed by: NURSE PRACTITIONER

## 2024-09-30 PROCEDURE — 3078F DIAST BP <80 MM HG: CPT | Performed by: NURSE PRACTITIONER

## 2024-09-30 PROCEDURE — 93010 ELECTROCARDIOGRAM REPORT: CPT | Performed by: INTERNAL MEDICINE

## 2024-09-30 PROCEDURE — 93005 ELECTROCARDIOGRAM TRACING: CPT | Performed by: NURSE PRACTITIONER

## 2024-09-30 PROCEDURE — 99214 OFFICE O/P EST MOD 30 MIN: CPT | Performed by: NURSE PRACTITIONER

## 2024-09-30 PROCEDURE — 1123F ACP DISCUSS/DSCN MKR DOCD: CPT | Performed by: NURSE PRACTITIONER

## 2024-09-30 PROCEDURE — 3077F SYST BP >= 140 MM HG: CPT | Performed by: NURSE PRACTITIONER

## 2024-09-30 PROCEDURE — 3008F BODY MASS INDEX DOCD: CPT | Performed by: NURSE PRACTITIONER

## 2024-09-30 PROCEDURE — 1036F TOBACCO NON-USER: CPT | Performed by: NURSE PRACTITIONER

## 2024-09-30 PROCEDURE — 1160F RVW MEDS BY RX/DR IN RCRD: CPT | Performed by: NURSE PRACTITIONER

## 2024-09-30 PROCEDURE — 1126F AMNT PAIN NOTED NONE PRSNT: CPT | Performed by: NURSE PRACTITIONER

## 2024-09-30 RX ORDER — AMLODIPINE BESYLATE 10 MG/1
10 TABLET ORAL DAILY
Qty: 30 TABLET | Refills: 11 | Status: SHIPPED | OUTPATIENT
Start: 2024-09-30 | End: 2025-09-30

## 2024-09-30 ASSESSMENT — PAIN SCALES - GENERAL: PAINLEVEL: 0-NO PAIN

## 2024-09-30 NOTE — PROGRESS NOTES
Subjective   Tito Garcia is a 73 y.o. male.    Chief Complaint:  Follow-up, Atrial Fibrillation, and Hypertension    Mr. Garcia returns for a routine 6 week follow up. He is here for a BP check. He has been feeling well from a cardiac standpoint. He has remained compliant with his medications, denying any intolerances. He has been monitoring his BP at home, which unfortunately remains elevated. He offers no other cardiovascular complaints or concerns today. He denies any complaints of chest pain, shortness of breath, lightheadedness, dizziness, palpitations, syncope, orthopnea, paroxysmal nocturnal dyspnea, lower extremity swelling or bleeding concerns.        Review of Systems   All other systems reviewed and are negative.      Objective   Physical Exam  Constitutional:       Appearance: Healthy appearance. In no distress  Pulmonary:      Effort: Pulmonary effort is normal.      Breath sounds: Normal breath sounds.   Cardiovascular:      Normal rate. Regular rhythm. Normal S1. Normal S2.       Murmurs: There is no murmur.      Carotids: right carotid pulse +2, no bruit heard over the right carotid. left carotid pulse +2, no bruit heard over the left carotid.  Edema:     Peripheral edema absent.   Abdominal:      Palpations: Abdomen is soft.   Musculoskeletal:       Cervical back: Normal range of motion.   Skin:     General: Skin is warm and dry. Normal color and pigmentation   Neurological:      Mental Status: Alert and oriented to person, place and time.   Psychiatric:     Mood and Affect: appropriate mood and appropriate affect.     EKG obtained and reviewed. Normal sinus rhythm. HR 64      Lab Review:   Lab Results   Component Value Date     07/01/2024    K 4.0 07/01/2024     07/01/2024    CO2 24 07/01/2024    BUN 10 07/01/2024    CREATININE 0.81 07/01/2024    GLUCOSE 107 (H) 07/01/2024    CALCIUM 9.7 07/01/2024     Lab Results   Component Value Date    WBC 7.1 07/01/2024    HGB 14.2 07/01/2024     HCT 42.5 07/01/2024    MCV 86 07/01/2024     07/01/2024     Lab Results   Component Value Date    CHOL 160 06/18/2024    TRIG 63 06/18/2024    HDL 45.0 06/18/2024       Assessment/Plan   Mr. Garcia is a pleasant 73 year old  male with a past medical history significant for hypertension, hyperlipidemia, obesity and a-fib, diagnosed 7/1/24 s/p cardioversion 7/30/24 on eliquis anticoagulation. Echocardiogram 7/17/24 showed an EF of 55-60% with normal RVSP, moderately to severely dilated left and right atrium and mild TR. He presents today for routine follow up stable from a cardiac standpoint. His EKG remains stable. He remains in NSR. Despite compliance with his medications, his BP remains elevated, therefore I will increase amlodipine to 10 mg daily. He will continue all other medications unchanged. He was encouraged to continue monitoring his BP at home. He will follow up with us in clinic in 6 weeks for another BP check. He knows to call for any concerns.     Of note, he should wait until at least November before proceeding with his knee replacement to safely hold eliquis.

## 2024-10-09 ENCOUNTER — CLINICAL SUPPORT (OUTPATIENT)
Dept: PRIMARY CARE | Facility: CLINIC | Age: 74
End: 2024-10-09
Payer: MEDICARE

## 2024-10-09 DIAGNOSIS — Z23 ENCOUNTER FOR IMMUNIZATION: ICD-10-CM

## 2024-10-09 PROCEDURE — G0008 ADMIN INFLUENZA VIRUS VAC: HCPCS | Performed by: FAMILY MEDICINE

## 2024-10-29 DIAGNOSIS — Z86.79 STATUS POST RADIOFREQUENCY ABLATION (RFA) OPERATION FOR ARRHYTHMIA: ICD-10-CM

## 2024-10-29 DIAGNOSIS — Z98.890 STATUS POST RADIOFREQUENCY ABLATION (RFA) OPERATION FOR ARRHYTHMIA: ICD-10-CM

## 2024-10-29 DIAGNOSIS — I48.91 ATRIAL FIBRILLATION (MULTI): ICD-10-CM

## 2024-11-03 DIAGNOSIS — I10 PRIMARY HYPERTENSION: Primary | ICD-10-CM

## 2024-11-04 RX ORDER — METOPROLOL SUCCINATE 50 MG/1
50 TABLET, EXTENDED RELEASE ORAL DAILY
Qty: 90 TABLET | Refills: 4 | Status: SHIPPED | OUTPATIENT
Start: 2024-11-04

## 2024-11-11 ENCOUNTER — OFFICE VISIT (OUTPATIENT)
Dept: CARDIOLOGY | Facility: CLINIC | Age: 74
End: 2024-11-11
Payer: MEDICARE

## 2024-11-11 VITALS
WEIGHT: 276.3 LBS | SYSTOLIC BLOOD PRESSURE: 138 MMHG | OXYGEN SATURATION: 98 % | HEART RATE: 83 BPM | DIASTOLIC BLOOD PRESSURE: 72 MMHG | HEIGHT: 72 IN | BODY MASS INDEX: 37.42 KG/M2

## 2024-11-11 DIAGNOSIS — I48.91 ATRIAL FIBRILLATION, UNSPECIFIED TYPE (MULTI): Primary | ICD-10-CM

## 2024-11-11 DIAGNOSIS — I10 PRIMARY HYPERTENSION: ICD-10-CM

## 2024-11-11 PROCEDURE — 99214 OFFICE O/P EST MOD 30 MIN: CPT | Performed by: NURSE PRACTITIONER

## 2024-11-11 PROCEDURE — 1159F MED LIST DOCD IN RCRD: CPT | Performed by: NURSE PRACTITIONER

## 2024-11-11 PROCEDURE — 1125F AMNT PAIN NOTED PAIN PRSNT: CPT | Performed by: NURSE PRACTITIONER

## 2024-11-11 PROCEDURE — 1123F ACP DISCUSS/DSCN MKR DOCD: CPT | Performed by: NURSE PRACTITIONER

## 2024-11-11 PROCEDURE — 1160F RVW MEDS BY RX/DR IN RCRD: CPT | Performed by: NURSE PRACTITIONER

## 2024-11-11 PROCEDURE — 3008F BODY MASS INDEX DOCD: CPT | Performed by: NURSE PRACTITIONER

## 2024-11-11 PROCEDURE — 1036F TOBACCO NON-USER: CPT | Performed by: NURSE PRACTITIONER

## 2024-11-11 PROCEDURE — 3078F DIAST BP <80 MM HG: CPT | Performed by: NURSE PRACTITIONER

## 2024-11-11 PROCEDURE — 93005 ELECTROCARDIOGRAM TRACING: CPT | Performed by: NURSE PRACTITIONER

## 2024-11-11 PROCEDURE — 3075F SYST BP GE 130 - 139MM HG: CPT | Performed by: NURSE PRACTITIONER

## 2024-11-11 PROCEDURE — 93010 ELECTROCARDIOGRAM REPORT: CPT | Performed by: INTERNAL MEDICINE

## 2024-11-11 ASSESSMENT — PAIN SCALES - GENERAL: PAINLEVEL_OUTOF10: 4

## 2024-11-11 NOTE — PROGRESS NOTES
Subjective   Tito Garcia is a 73 y.o. male.    Chief Complaint:  Hypertension and Atrial Fibrillation    Mr. Garcia returns for a routine 6 week follow up. He is here for a BP check. At his last visit we increased amlodipine. He seems to be tolerating this. He brought his home monitor, which is correlating with our reading today. His biggest issue continues to be knee pain, though he is taking care of his wife who is not doing well, and that is his main priority. He offers no other cardiovascular complaints or concerns today. He denies any complaints of chest pain, shortness of breath, lightheadedness, dizziness, palpitations, syncope, orthopnea, paroxysmal nocturnal dyspnea, lower extremity swelling or bleeding concerns.          Review of Systems   All other systems reviewed and are negative.      Objective   Physical Exam  Constitutional:       Appearance: Healthy appearance. In no distress  Pulmonary:      Effort: Pulmonary effort is normal.      Breath sounds: Normal breath sounds.   Cardiovascular:      Normal rate. Regular rhythm. Normal S1. Normal S2.       Murmurs: There is no murmur.      Carotids: right carotid pulse +2, no bruit heard over the right carotid. left carotid pulse +2, no bruit heard over the left carotid.  Edema:     Peripheral edema absent.   Abdominal:      Palpations: Abdomen is soft.   Musculoskeletal:       Cervical back: Normal range of motion.   Skin:     General: Skin is warm and dry. Normal color and pigmentation   Neurological:      Mental Status: Alert and oriented to person, place and time.   Psychiatric:     Mood and Affect: appropriate mood and appropriate affect.     EKG obtained and reviewed. Normal sinus rhythm. HR 71      Lab Review:   Lab Results   Component Value Date     07/01/2024    K 4.0 07/01/2024     07/01/2024    CO2 24 07/01/2024    BUN 10 07/01/2024    CREATININE 0.81 07/01/2024    GLUCOSE 107 (H) 07/01/2024    CALCIUM 9.7 07/01/2024     Lab  Results   Component Value Date    WBC 7.1 07/01/2024    HGB 14.2 07/01/2024    HCT 42.5 07/01/2024    MCV 86 07/01/2024     07/01/2024     Lab Results   Component Value Date    CHOL 160 06/18/2024    TRIG 63 06/18/2024    HDL 45.0 06/18/2024       Assessment/Plan   Mr. Garcia is a pleasant 73 year old  male with a past medical history significant for hypertension, hyperlipidemia, obesity and a-fib, diagnosed 7/1/24 s/p cardioversion 7/30/24 on eliquis anticoagulation. Echocardiogram 7/17/24 showed an EF of 55-60% with normal RVSP, moderately to severely dilated left and right atrium and mild TR. He presents today for routine follow up stable from a cardiac standpoint. His VS and EKG remain stable. His BP has improved since increasing amlodipine. I will have him continue all medications unchanged. He will follow up with us in clinic in 6 months. He knows to call for any concerns.

## 2024-11-14 LAB
ATRIAL RATE: 71 BPM
P AXIS: 10 DEGREES
P OFFSET: 174 MS
P ONSET: 115 MS
PR INTERVAL: 192 MS
Q ONSET: 211 MS
QRS COUNT: 12 BEATS
QRS DURATION: 94 MS
QT INTERVAL: 382 MS
QTC CALCULATION(BAZETT): 415 MS
QTC FREDERICIA: 404 MS
R AXIS: 1 DEGREES
T AXIS: 16 DEGREES
T OFFSET: 402 MS
VENTRICULAR RATE: 71 BPM

## 2024-12-18 ENCOUNTER — APPOINTMENT (OUTPATIENT)
Dept: PRIMARY CARE | Facility: CLINIC | Age: 74
End: 2024-12-18
Payer: MEDICARE

## 2025-01-08 ENCOUNTER — OFFICE VISIT (OUTPATIENT)
Dept: PRIMARY CARE | Facility: CLINIC | Age: 75
End: 2025-01-08
Payer: MEDICARE

## 2025-01-08 VITALS
DIASTOLIC BLOOD PRESSURE: 62 MMHG | SYSTOLIC BLOOD PRESSURE: 118 MMHG | HEIGHT: 72 IN | BODY MASS INDEX: 36.98 KG/M2 | WEIGHT: 273 LBS | HEART RATE: 86 BPM | OXYGEN SATURATION: 100 %

## 2025-01-08 DIAGNOSIS — Z86.79 STATUS POST RADIOFREQUENCY ABLATION (RFA) OPERATION FOR ARRHYTHMIA: ICD-10-CM

## 2025-01-08 DIAGNOSIS — I73.9 PERIPHERAL VASCULAR DISEASE, UNSPECIFIED (CMS-HCC): ICD-10-CM

## 2025-01-08 DIAGNOSIS — M17.0 OSTEOARTHRITIS OF BOTH KNEES, UNSPECIFIED OSTEOARTHRITIS TYPE: ICD-10-CM

## 2025-01-08 DIAGNOSIS — F41.9 ANXIETY AND DEPRESSION: ICD-10-CM

## 2025-01-08 DIAGNOSIS — I48.91 ATRIAL FIBRILLATION (MULTI): ICD-10-CM

## 2025-01-08 DIAGNOSIS — E78.2 MIXED HYPERLIPIDEMIA: ICD-10-CM

## 2025-01-08 DIAGNOSIS — I48.91 ATRIAL FIBRILLATION, UNSPECIFIED TYPE (MULTI): ICD-10-CM

## 2025-01-08 DIAGNOSIS — R97.20 ELEVATED PSA: ICD-10-CM

## 2025-01-08 DIAGNOSIS — E55.9 VITAMIN D DEFICIENCY: ICD-10-CM

## 2025-01-08 DIAGNOSIS — Z11.59 SCREENING FOR VIRAL DISEASE: ICD-10-CM

## 2025-01-08 DIAGNOSIS — I10 PRIMARY HYPERTENSION: Primary | ICD-10-CM

## 2025-01-08 DIAGNOSIS — Z12.11 ENCOUNTER FOR SCREENING COLONOSCOPY: ICD-10-CM

## 2025-01-08 DIAGNOSIS — R19.5 LOOSE STOOLS: ICD-10-CM

## 2025-01-08 DIAGNOSIS — F32.A ANXIETY AND DEPRESSION: ICD-10-CM

## 2025-01-08 DIAGNOSIS — Z00.00 MEDICARE ANNUAL WELLNESS VISIT, SUBSEQUENT: ICD-10-CM

## 2025-01-08 DIAGNOSIS — Z98.890 STATUS POST RADIOFREQUENCY ABLATION (RFA) OPERATION FOR ARRHYTHMIA: ICD-10-CM

## 2025-01-08 DIAGNOSIS — G47.19 EXCESSIVE DAYTIME SLEEPINESS: ICD-10-CM

## 2025-01-08 DIAGNOSIS — F51.01 PRIMARY INSOMNIA: ICD-10-CM

## 2025-01-08 PROCEDURE — 99214 OFFICE O/P EST MOD 30 MIN: CPT | Performed by: FAMILY MEDICINE

## 2025-01-08 PROCEDURE — 3074F SYST BP LT 130 MM HG: CPT | Performed by: FAMILY MEDICINE

## 2025-01-08 PROCEDURE — 99397 PER PM REEVAL EST PAT 65+ YR: CPT | Performed by: FAMILY MEDICINE

## 2025-01-08 PROCEDURE — 1170F FXNL STATUS ASSESSED: CPT | Performed by: FAMILY MEDICINE

## 2025-01-08 PROCEDURE — 99214 OFFICE O/P EST MOD 30 MIN: CPT | Mod: 25 | Performed by: FAMILY MEDICINE

## 2025-01-08 PROCEDURE — 1159F MED LIST DOCD IN RCRD: CPT | Performed by: FAMILY MEDICINE

## 2025-01-08 PROCEDURE — 3078F DIAST BP <80 MM HG: CPT | Performed by: FAMILY MEDICINE

## 2025-01-08 PROCEDURE — 1125F AMNT PAIN NOTED PAIN PRSNT: CPT | Performed by: FAMILY MEDICINE

## 2025-01-08 PROCEDURE — 1036F TOBACCO NON-USER: CPT | Performed by: FAMILY MEDICINE

## 2025-01-08 PROCEDURE — 1123F ACP DISCUSS/DSCN MKR DOCD: CPT | Performed by: FAMILY MEDICINE

## 2025-01-08 PROCEDURE — 3008F BODY MASS INDEX DOCD: CPT | Performed by: FAMILY MEDICINE

## 2025-01-08 RX ORDER — ATORVASTATIN CALCIUM 10 MG/1
10 TABLET, FILM COATED ORAL DAILY
Qty: 90 TABLET | Refills: 3 | Status: SHIPPED | OUTPATIENT
Start: 2025-01-08

## 2025-01-08 RX ORDER — METOPROLOL SUCCINATE 50 MG/1
50 TABLET, EXTENDED RELEASE ORAL DAILY
Qty: 90 TABLET | Refills: 4 | Status: SHIPPED | OUTPATIENT
Start: 2025-01-08

## 2025-01-08 RX ORDER — LOSARTAN POTASSIUM 100 MG/1
100 TABLET ORAL DAILY
Qty: 90 TABLET | Refills: 3 | Status: SHIPPED | OUTPATIENT
Start: 2025-01-08 | End: 2026-01-08

## 2025-01-08 RX ORDER — AMLODIPINE BESYLATE 10 MG/1
10 TABLET ORAL DAILY
Qty: 30 TABLET | Refills: 11 | Status: SHIPPED | OUTPATIENT
Start: 2025-01-08 | End: 2026-01-08

## 2025-01-08 RX ORDER — ATORVASTATIN CALCIUM 10 MG/1
10 TABLET, FILM COATED ORAL DAILY
Qty: 90 TABLET | Refills: 3 | Status: SHIPPED | OUTPATIENT
Start: 2025-01-08 | End: 2025-01-08 | Stop reason: SDUPTHER

## 2025-01-08 ASSESSMENT — ENCOUNTER SYMPTOMS
OCCASIONAL FEELINGS OF UNSTEADINESS: 1
LOSS OF SENSATION IN FEET: 1
DEPRESSION: 1

## 2025-01-08 ASSESSMENT — PATIENT HEALTH QUESTIONNAIRE - PHQ9
SUM OF ALL RESPONSES TO PHQ9 QUESTIONS 1 AND 2: 2
2. FEELING DOWN, DEPRESSED OR HOPELESS: SEVERAL DAYS
1. LITTLE INTEREST OR PLEASURE IN DOING THINGS: SEVERAL DAYS
1. LITTLE INTEREST OR PLEASURE IN DOING THINGS: SEVERAL DAYS
2. FEELING DOWN, DEPRESSED OR HOPELESS: SEVERAL DAYS
SUM OF ALL RESPONSES TO PHQ9 QUESTIONS 1 AND 2: 2
10. IF YOU CHECKED OFF ANY PROBLEMS, HOW DIFFICULT HAVE THESE PROBLEMS MADE IT FOR YOU TO DO YOUR WORK, TAKE CARE OF THINGS AT HOME, OR GET ALONG WITH OTHER PEOPLE: SOMEWHAT DIFFICULT

## 2025-01-08 ASSESSMENT — ACTIVITIES OF DAILY LIVING (ADL)
DRESSING: INDEPENDENT
TAKING_MEDICATION: INDEPENDENT
MANAGING_FINANCES: INDEPENDENT
DOING_HOUSEWORK: INDEPENDENT
BATHING: INDEPENDENT
GROCERY_SHOPPING: INDEPENDENT

## 2025-01-08 ASSESSMENT — PAIN SCALES - GENERAL
PAINLEVEL_OUTOF10: 3
PAINLEVEL_OUTOF10: 3

## 2025-01-08 ASSESSMENT — COLUMBIA-SUICIDE SEVERITY RATING SCALE - C-SSRS
2. HAVE YOU ACTUALLY HAD ANY THOUGHTS OF KILLING YOURSELF?: NO
6. HAVE YOU EVER DONE ANYTHING, STARTED TO DO ANYTHING, OR PREPARED TO DO ANYTHING TO END YOUR LIFE?: NO
1. IN THE PAST MONTH, HAVE YOU WISHED YOU WERE DEAD OR WISHED YOU COULD GO TO SLEEP AND NOT WAKE UP?: NO

## 2025-01-08 ASSESSMENT — COGNITIVE AND FUNCTIONAL STATUS - GENERAL: VERBAL FLUENCY - ANIMAL NAMES (0 TO 25): 3

## 2025-01-08 NOTE — PROGRESS NOTES
74-year-old resents to clinic for Medicare annual wellness visit, follow chronic medical conditions    1. Primary hypertension   Blood pressure 118/62 presently no chest pain or shortness of breath   2. Mixed hyperlipidemia   Goal LDL less than 100, currently on moderate intensity statin medication   3. Anxiety and depression   Wife is currently struggling with dementia and he is under a lot of undue stress recently.  Has a history of anxiety and depression in the past.  He states that his mood is feeling more down.  Does not express SI or HI.  Has not previously taken medication for this   4. Medicare annual wellness visit, subsequent    5. Primary insomnia   Difficulty with sleep onset and sleep latency.   6. Loose stools   Has never had colon cancer screening.  States colonoscopy cannot be done due to needing to care for his wife, Cologuard was attempted in the past but stools were too loose to get accurate results.  Still experiencing loose stools   7. Encounter for screening colonoscopy    8. Vitamin D deficiency    9. Elevated PSA   Has had elevated PSAs in the past and followed with urology previously.   10. Atrial fibrillation, unspecified type (Multi)   Follows with cardiology regularly denies any chest pain or palpitations presently   11. Atrial fibrillation (Multi)    12. Status post radiofrequency ablation (RFA) operation for arrhythmia    13. Peripheral vascular disease, unspecified (CMS-HCC)    14. Screening for viral disease    15. Osteoarthritis of both knees, unspecified osteoarthritis type   Was set to have surgical intervention for his knees however atrial fibrillation delayed this.  Like to proceed with continued workup and treatment for his bilateral knee pain   16. Excessive daytime sleepiness   Recent onset atrial fibrillation, difficulty with sleep onset and sleep latency, unsure if patient snores however has never had a sleep study       All pertinent positive symptoms are included in  history of present illness.    All other systems have been reviewed and are negative and noncontributory to this patient's current ailments.    CONSTITUTIONAL - INAD. Not ill appearing.  SKIN - No lesions or rashes visualized. Good skin turgor.  HEENT- Head is atraumativc and normocephalic. Nasal turbinates are nonerythematous and without drainage. .  RESP - CTAB. No wheezing, rhonchi, or crackles.   CARDIAC - RRR. No murmurs, gallops, or rubs.  ABDOMEN - Soft, nontender, nondistended. No organomegaly.  NEURO - CNs 2-12 grossly intact.  PSYCH - Normal mood and affect      1. Primary hypertension (Primary)  Well-controlled continue medication  - amLODIPine (Norvasc) 10 mg tablet; Take 1 tablet (10 mg) by mouth once daily.  Dispense: 30 tablet; Refill: 11  - losartan (Cozaar) 100 mg tablet; Take 1 tablet (100 mg) by mouth once daily.  Dispense: 90 tablet; Refill: 3  - metoprolol succinate XL (Toprol-XL) 50 mg 24 hr tablet; Take 1 tablet (50 mg) by mouth once daily.  Dispense: 90 tablet; Refill: 4    2. Mixed hyperlipidemia  Check lipids continue statin    3. Anxiety and depression  Spoke extensively about the natural history and course of depression and/or anxiety.  Spoke about different treatment options including the role of therapy, CBT, and counseling as well as the role of different medications.  Discussed different medications including SSRIs as being the gold standard.  Spoke about the different SSRIs such as Prozac, Lexapro, Zoloft, Effexor, and various others as well as the cost/benefit analysis of these medications including expected side effects.  Discussed adjunct treatments for anxiety and depression including treatments for insomnia such as trazodone, melatonin, doxepin, or other medications.  Also spoke about different medications for acute anxiety such as hydroxyzine or benzodiazepines.     Spoke about how, in very specific circumstances, use of benzodiazepines may be indicated for the treatment of  acute panic attacks.  If these were recommended, I informed the patient of the risks of these medications, their habit-forming nature, their interactions with other medications, as well as the need for close follow-up.  Spoke about how the use of these medications is limited to the very short-term and only very small quantities and how I do not prescribe these medications daily for the treatment of anxiety or panic disorder.  Should these medications be required to be taken daily, a referral to an appropriate specialist will need to be made.    Lab work was ordered and/or reviewed to screen for any organic causes of anxiety and/or depression such as a CBC, CMP, TSH, or Vitamin D.    Patient made an informed decision on one or more of these treatments at this time.  - Referral to Psychology; Future    4. Medicare annual wellness visit, subsequent  Health and wellness topics discussed today.  Recommended eating a varied and healthy diet and made dietary recommendations, also discussed exercise and exercising regularly 30 minutes a day 3 days a week.  Immunizations recommended and updated.  Health screening guidelines discussed with patient including possible things such as colonoscopies, mammograms, prostate screenings, lung cancer screenings, bone densitometry, and other wellness topics.  Yearly lab work ordered today.  - Comprehensive Metabolic Panel; Future  - Lipid Panel; Future  - TSH with reflex to Free T4 if abnormal; Future    5. Primary insomnia  Spoke to the patient extensively about the natural history and course of insomnia. Spoke about sleep onset disorders and sleep latency disorders.     Spoke about how a combination of sleep hygiene, circadian reset, and treating the underlying condition will likely lead to resolution and better sleep.    Medications used to treat insomnia with possibilities including melatonin, trazodone, doxepin, hypnotics such as Ambien or Lunesta, and others.    Advised the patient  to start taking melatonin at the same time every night to help reset hormone signaling.     Good sleep habits (sometimes referred to as ``sleep hygiene´´) can help you get a good night´s sleep.      Some habits that can improve your sleep health:  -Be consistent. Go to bed at the same time each night and get up at the same time each morning, including on the weekends.  -Make sure your bedroom is quiet, dark, relaxing, and at a comfortable temperature.  -Remove electronic devices, such as TVs, computers, and smart phones, from the bedroom.  Avoid blue lights after sundown and use a bluelight filter for your phone and television sets.  -Avoid large meals, caffeine, and alcohol before bedtime.  -Get some exercise. Being physically active during the day can help you fall asleep more easily at night.  There are very few  recruits with insomnia.  High intensity exercise to exhaustion will allow you to sleep and overall improve your health.  -Melatonin taken at the same time every night can help reset your circadian rhythm and homone signals that it's time to sleep.    6. Loose stools  Highly recommend patient proceed with colon cancer screening.  Advised dietary interventions.  - CBC and Auto Differential; Future    7. Encounter for screening colonoscopy    - Cologuard® colon cancer screening; Future  - Cologuard® colon cancer screening    8. Vitamin D deficiency    - Vitamin D 25-Hydroxy,Total (for eval of Vitamin D levels); Future    9. Elevated PSA  Check PSA  - Prostate Specific Antigen, Screen; Future    10. Atrial fibrillation, unspecified type (Multi)  Stable continue Eliquis    11. Atrial fibrillation (Multi)    - apixaban (Eliquis) 5 mg tablet; Take 1 tablet (5 mg) by mouth 2 times a day.  Dispense: 180 tablet; Refill: 3    12. Status post radiofrequency ablation (RFA) operation for arrhythmia    - apixaban (Eliquis) 5 mg tablet; Take 1 tablet (5 mg) by mouth 2 times a day.  Dispense: 180 tablet; Refill:  3    13. Peripheral vascular disease, unspecified (CMS-HCC)    - atorvastatin (Lipitor) 10 mg tablet; Take 1 tablet (10 mg) by mouth once daily.  Dispense: 90 tablet; Refill: 3    14. Screening for viral disease    - Hepatitis C antibody; Future    15. Osteoarthritis of both knees, unspecified osteoarthritis type  Will proceed with x-rays of the knees, follow-up with orthopedic surgery to discuss knee replacement  - XR knee 4+ views bilateral; Future  - Referral to Orthopaedic Surgery; Future    16. Excessive daytime sleepiness  Spoke about the natural history and course of sleep apnea.       Spoke about the signs and symptoms of sleep apnea including chronic fatigue, excessive daytime sleepiness, snoring, the appearance of stopping breathing while sleeping, morning headaches, and the need for frequent napping.    Spoke about the evaluation and treatment of sleep apnea including the need for either home sleep study or evaluation had a sleep center with a polysomnogram.  Spoke about treatment options including lifestyle interventions, weight loss, positional therapy, CPAP machine/AutoPap machine, surgical correction, as well as implantable devices like the inspire device.    Spoke about the possible long-term side effects of untreated sleep apnea with the patient including the increased risk for cardiovascular disease, lung disease, difficulty losing weight, high fasting sugars and diabetes, and risk for metabolic syndrome.    We will start with a home sleep study or polysomnogram and proceed with treatment from there if necessary.  - Home sleep apnea test (HSAT); Future

## 2025-02-11 DIAGNOSIS — R97.20 ELEVATED PSA: Primary | ICD-10-CM

## 2025-02-12 ENCOUNTER — PREP FOR PROCEDURE (OUTPATIENT)
Dept: ORTHOPEDIC SURGERY | Facility: CLINIC | Age: 75
End: 2025-02-12
Payer: MEDICARE

## 2025-02-12 DIAGNOSIS — M17.11 PRIMARY OSTEOARTHRITIS OF RIGHT KNEE: Primary | ICD-10-CM

## 2025-02-12 LAB
25(OH)D3+25(OH)D2 SERPL-MCNC: 27 NG/ML (ref 30–100)
ALBUMIN SERPL-MCNC: 4.6 G/DL (ref 3.6–5.1)
ALP SERPL-CCNC: 72 U/L (ref 35–144)
ALT SERPL-CCNC: 19 U/L (ref 9–46)
ANION GAP SERPL CALCULATED.4IONS-SCNC: 10 MMOL/L (CALC) (ref 7–17)
AST SERPL-CCNC: 24 U/L (ref 10–35)
BASOPHILS # BLD AUTO: 33 CELLS/UL (ref 0–200)
BASOPHILS NFR BLD AUTO: 0.5 %
BILIRUB SERPL-MCNC: 0.7 MG/DL (ref 0.2–1.2)
BUN SERPL-MCNC: 12 MG/DL (ref 7–25)
CALCIUM SERPL-MCNC: 9.4 MG/DL (ref 8.6–10.3)
CHLORIDE SERPL-SCNC: 104 MMOL/L (ref 98–110)
CHOLEST SERPL-MCNC: 148 MG/DL
CHOLEST/HDLC SERPL: 3.5 (CALC)
CO2 SERPL-SCNC: 24 MMOL/L (ref 20–32)
CREAT SERPL-MCNC: 0.91 MG/DL (ref 0.7–1.28)
EGFRCR SERPLBLD CKD-EPI 2021: 88 ML/MIN/1.73M2
EOSINOPHIL # BLD AUTO: 189 CELLS/UL (ref 15–500)
EOSINOPHIL NFR BLD AUTO: 2.9 %
ERYTHROCYTE [DISTWIDTH] IN BLOOD BY AUTOMATED COUNT: 11.7 % (ref 11–15)
GLUCOSE SERPL-MCNC: 88 MG/DL (ref 65–139)
HCT VFR BLD AUTO: 40.3 % (ref 38.5–50)
HCV AB SERPL QL IA: NORMAL
HDLC SERPL-MCNC: 42 MG/DL
HGB BLD-MCNC: 13.3 G/DL (ref 13.2–17.1)
LDLC SERPL CALC-MCNC: 87 MG/DL (CALC)
LYMPHOCYTES # BLD AUTO: 2184 CELLS/UL (ref 850–3900)
LYMPHOCYTES NFR BLD AUTO: 33.6 %
MCH RBC QN AUTO: 29.5 PG (ref 27–33)
MCHC RBC AUTO-ENTMCNC: 33 G/DL (ref 32–36)
MCV RBC AUTO: 89.4 FL (ref 80–100)
MONOCYTES # BLD AUTO: 611 CELLS/UL (ref 200–950)
MONOCYTES NFR BLD AUTO: 9.4 %
NEUTROPHILS # BLD AUTO: 3484 CELLS/UL (ref 1500–7800)
NEUTROPHILS NFR BLD AUTO: 53.6 %
NONHDLC SERPL-MCNC: 106 MG/DL (CALC)
PLATELET # BLD AUTO: 264 THOUSAND/UL (ref 140–400)
PMV BLD REES-ECKER: 12.5 FL (ref 7.5–12.5)
POTASSIUM SERPL-SCNC: 4.1 MMOL/L (ref 3.5–5.3)
PROT SERPL-MCNC: 7.5 G/DL (ref 6.1–8.1)
PSA SERPL-MCNC: 23.17 NG/ML
RBC # BLD AUTO: 4.51 MILLION/UL (ref 4.2–5.8)
SODIUM SERPL-SCNC: 138 MMOL/L (ref 135–146)
TRIGL SERPL-MCNC: 91 MG/DL
TSH SERPL-ACNC: 1.53 MIU/L (ref 0.4–4.5)
WBC # BLD AUTO: 6.5 THOUSAND/UL (ref 3.8–10.8)

## 2025-02-12 NOTE — RESULT ENCOUNTER NOTE
CBC non-concerning, no signs of anemias or other obvious blood disorders.  CMP came back showing: Liver function is normal.  Renal function is normal.  No concern for acute kidney injury or chronic kidney disease.  Electrolyte levels are not concerning.  Lipid panel came back nonconcerning.  LDL is at goal.  TSH is normal.  No concern for thyroid issues.  Vitamin D level was low.  I recommend taking 2000 to 5000 IU daily of vitamin D3 over-the-counter.  Large increase in PSA from previous.  Recommend evaluation by urology.  Referral placed.

## 2025-02-12 NOTE — RESULT ENCOUNTER NOTE
CBC non-concerning, no signs of anemias or other obvious blood disorders.  CMP came back showing: Liver function is normal.  Renal function is normal.  No concern for acute kidney injury or chronic kidney disease.  Electrolyte levels are not concerning.  Lipid panel came back nonconcerning.  LDL is at goal.    TSH is normal.  No concern for thyroid issues.  Vitamin D level was low.  I recommend taking 2000 to 5000 IU daily of vitamin D3 over-the-counter.

## 2025-03-20 ENCOUNTER — PRE-ADMISSION TESTING (OUTPATIENT)
Dept: PREADMISSION TESTING | Facility: HOSPITAL | Age: 75
End: 2025-03-20
Payer: MEDICARE

## 2025-03-20 ENCOUNTER — HOSPITAL ENCOUNTER (OUTPATIENT)
Dept: RADIOLOGY | Facility: HOSPITAL | Age: 75
Discharge: HOME | End: 2025-03-20
Payer: MEDICARE

## 2025-03-20 VITALS
TEMPERATURE: 98.1 F | SYSTOLIC BLOOD PRESSURE: 154 MMHG | RESPIRATION RATE: 18 BRPM | WEIGHT: 266.7 LBS | DIASTOLIC BLOOD PRESSURE: 84 MMHG | HEIGHT: 71 IN | OXYGEN SATURATION: 98 % | BODY MASS INDEX: 37.34 KG/M2 | HEART RATE: 71 BPM

## 2025-03-20 DIAGNOSIS — M17.11 UNILATERAL PRIMARY OSTEOARTHRITIS, RIGHT KNEE: ICD-10-CM

## 2025-03-20 DIAGNOSIS — R79.9 ABNORMAL FINDING OF BLOOD CHEMISTRY, UNSPECIFIED: ICD-10-CM

## 2025-03-20 DIAGNOSIS — M17.11 PRIMARY OSTEOARTHRITIS OF RIGHT KNEE: ICD-10-CM

## 2025-03-20 DIAGNOSIS — Z01.818 PREOP TESTING: Primary | ICD-10-CM

## 2025-03-20 PROCEDURE — 87081 CULTURE SCREEN ONLY: CPT | Mod: BEALAB

## 2025-03-20 PROCEDURE — 99204 OFFICE O/P NEW MOD 45 MIN: CPT | Performed by: NURSE PRACTITIONER

## 2025-03-20 PROCEDURE — 73700 CT LOWER EXTREMITY W/O DYE: CPT | Mod: RT

## 2025-03-20 RX ORDER — CHLORHEXIDINE GLUCONATE 40 MG/ML
SOLUTION TOPICAL DAILY
Qty: 470 ML | Refills: 0 | Status: SHIPPED | OUTPATIENT
Start: 2025-03-20 | End: 2025-03-25

## 2025-03-20 RX ORDER — CHLORHEXIDINE GLUCONATE ORAL RINSE 1.2 MG/ML
15 SOLUTION DENTAL DAILY
Qty: 30 ML | Refills: 0 | Status: SHIPPED | OUTPATIENT
Start: 2025-03-20 | End: 2025-03-22

## 2025-03-20 RX ORDER — CHOLECALCIFEROL (VITAMIN D3) 50 MCG
50 TABLET ORAL DAILY
COMMUNITY

## 2025-03-20 ASSESSMENT — PAIN - FUNCTIONAL ASSESSMENT: PAIN_FUNCTIONAL_ASSESSMENT: 0-10

## 2025-03-20 ASSESSMENT — PAIN DESCRIPTION - DESCRIPTORS: DESCRIPTORS: ACHING

## 2025-03-20 ASSESSMENT — PAIN SCALES - GENERAL: PAINLEVEL_OUTOF10: 4

## 2025-03-20 NOTE — PREPROCEDURE INSTRUCTIONS
Medication List            Accurate as of March 20, 2025 11:15 AM. Always use your most recent med list.                alpha tocopherol 670 mg (1,000 unit) capsule  Commonly known as: Vitamin E  Additional Medication Adjustments for Surgery: Take last dose 7 days before surgery  Notes to patient: last dose preoperatively on 4/6/25     amLODIPine 10 mg tablet  Commonly known as: Norvasc  Take 1 tablet (10 mg) by mouth once daily.  Medication Adjustments for Surgery: Take/Use as prescribed     apixaban 5 mg tablet  Commonly known as: Eliquis  Take 1 tablet (5 mg) by mouth 2 times a day.  Additional Medication Adjustments for Surgery: Other (Comment)  Notes to patient: We will contact Dr. Champion for instructions      ascorbic acid 1,000 mg tablet  Commonly known as: Vitamin C  Additional Medication Adjustments for Surgery: Take last dose 7 days before surgery  Notes to patient: last dose preoperatively on 4/6/25     atorvastatin 10 mg tablet  Commonly known as: Lipitor  Take 1 tablet (10 mg) by mouth once daily.  Medication Adjustments for Surgery: Take/Use as prescribed     cholecalciferol 50 MCG (2000 UT) tablet  Commonly known as: Vitamin D-3  Additional Medication Adjustments for Surgery: Take last dose 7 days before surgery  Notes to patient: last dose preoperatively on 4/6/25     cyanocobalamin 1,000 mcg tablet  Commonly known as: Vitamin B-12  Additional Medication Adjustments for Surgery: Take last dose 7 days before surgery  Notes to patient: last dose preoperatively on 4/6/25     flaxseed oiL 1,000 mg capsule  Additional Medication Adjustments for Surgery: Take last dose 7 days before surgery  Notes to patient: last dose preoperatively on 4/6/25     losartan 100 mg tablet  Commonly known as: Cozaar  Take 1 tablet (100 mg) by mouth once daily.  Medication Adjustments for Surgery: Take last dose 1 day (24 hours) before surgery  Notes to patient: last dose preoperatively if taken in the mornings on  4/13/25 if taken in the evenings on 4/12/25     metoprolol succinate XL 50 mg 24 hr tablet  Commonly known as: Toprol-XL  Take 1 tablet (50 mg) by mouth once daily.  Medication Adjustments for Surgery: Take/Use as prescribed     Move [facility] 750 mg-100 mg- 1.65 mg-108 mg tablet  Generic drug: glucosam-chond-hyalu-CF borate  Additional Medication Adjustments for Surgery: Take last dose 7 days before surgery  Notes to patient: last dose preoperatively on 4/6/25     multivitamin tablet  Additional Medication Adjustments for Surgery: Take last dose 7 days before surgery  Notes to patient: last dose preoperatively on 4/6/25     TURMERIC ORAL  Additional Medication Adjustments for Surgery: Take last dose 7 days before surgery  Notes to patient: last dose preoperatively on 4/6/25            NPO Instructions:     Do not eat any food after midnight the night before your surgery/procedure.  You may have clear liquids until TWO hours before surgery/procedure. This includes water, black tea/coffee, (no milk or cream) apple juice and electrolyte drinks (Gatorade).  You may chew gum up to TWO hours before your surgery/procedure.     Additional Instructions:      Seven/Six Days before Surgery:  Review your medication instructions, stop indicated medications  Five Days before Surgery:  Review your medication instructions, stop indicated medications  Begin using your Hibiclens  Three Days before Surgery:  Review your medication instructions, stop indicated medications  The Day before Surgery:  Start using 0.12% CHG mouthwash  No smoking or alcohol use 24 hours before surgery  Review your medication instructions, stop indicated medications  You will be contacted regarding the time of your arrival to facility and surgery time  Do not eat any food after Midnight  Day of Surgery:  Review your medication instructions, take indicated medications  If you have diabetes, please check your fasting blood sugar upon awakening.  If  fasting blood sugar is <80 mg/dl, drink 100 ml of apple juice, time limit of 2 hours before  You may have clear liquids until TWO hours before surgery/procedure.  This includes water, black tea/coffee, (no milk or cream) apple juice and electrolyte drinks (Gatorade)  You may chew gum up to TWO hours before your surgery/procedure  Wear  comfortable loose fitting clothing  Do not use moisturizers, creams, lotions or perfume  All jewelry and valuables should be left at home     CONTACT SURGEON'S OFFICE IF YOU DEVELOP:  * Fever = 100.4 F   * New respiratory symptoms (e.g. cough, shortness of breath, respiratory distress, sore throat)  * Recent loss of taste or smell  *Flu like symptoms such as headache, fatigue or gastrointestinal symptoms  * You develop any open sores, shingles, burning or painful urination   AND/OR:  * You no longer wish to have the surgery.  * Any other personal circumstances change that may lead to the need to cancel or defer this surgery.  *You were admitted to any hospital within one week of your planned procedure.     SMOKING:  *Quitting smoking can make a huge difference to your health and recovery from surgery.    *If you need help with quitting, call 8-337-QUIT-NOW.     THE DAY BEFORE SURGERY:  *Do not eat any food after midnight the night before your surgery.   *You may have up to TEN OUNCES of clear liquids until TWO hours before your instructed ARRIVAL TIME to hospital. This includes water, black tea/coffee, (no milk or cream) apple juice, clear broth and electrolyte drinks (Gatorade). Please avoid clear liquids that are red in color.   *You may chew gum/mints up to TWO hours before your surgery/procedure.     SURGICAL TIME:  *You will be contacted between 2 p.m. and 3 p.m. the business day before your surgery with your arrival time.  *If you haven't received a call by 3pm, call [phone]  *Scheduled surgery times may change and you will be notified if this occurs-check your personal  voicemail for any updates.     ON THE MORNING OF SURGERY:  *Wear comfortable, loose fitting clothing.   *Do not use moisturizers, creams, lotions or perfume.  *All jewelry and valuables should be left at home.  *Prosthetic devices such as contact lenses, hearing aids, dentures, eyelash extensions, hairpins and body piercing must be removed before surgery.     BRING WITH YOU:  *Photo ID and insurance card  *Current list of medications and allergies  *Pacemaker/Defibrillator/Heart stent cards  *CPAP machine and mask  *Slings/splints/crutches  *Copy of your complete Advanced Directive/DHPOA-if applicable  *Neurostimulator implant remote     PARKING AND ARRIVAL:  *Check in at the Main Entrance desk and let them know you are here for surgery.     IF YOU ARE HAVING OUTPATIENT/SAME DAY SURGERY:  *A responsible adult MUST accompany you at the time of discharge and stay with you for 24 hours after your surgery.  *You may NOT drive yourself home after surgery.  *You may use a taxi or ride sharing service ([email or web address], Uber) to return home ONLY if you are accompanied by a friend or family member.  *Instructions for resuming your medications will be provided by your surgeon.     Thank you for coming to Pre Admission testing.      If I have prescribed medication please don't forget to  at your pharmacy.      Any questions about today's visit call [phone] and leave a message in the general mailbox.     Patient instructed to ambulate as soon as possible postoperatively to decrease thromboembolic risk.        Thank you for visiting [facility].  If you have any changes to your health condition, please call the surgeons office to alert them and give them details of your symptoms.        Preoperative Fasting Guidelines     Why must I stop eating and drinking near surgery time?  With sedation, food or liquid in your stomach can enter your lungs causing serious complications  Increases nausea and  vomiting     When do I need to stop eating and drinking before my surgery?  Do not eat any food after midnight the night before your surgery/procedure.  You may have up to TEN ounces of clear liquid until TWO hours before your instructed arrival time to the hospital.  This includes water, black tea/coffee, (no milk or cream) apple juice, and electrolyte drinks (Gatorade)  You may chew gum until TWO hours before your surgery/procedure        Additional Instructions:      The Day before Surgery:  -Review your medication instructions, stop indicated medications  -You will be contacted in the evening regarding the time of your arrival to facility and surgery time     Day of Surgery:  -Review your medication instructions, take indicated medications  -Wear comfortable loose fitting clothing  -Do not use moisturizers, creams, lotions or perfume  -All jewelry and valuables should be left at home                   Preoperative Brain Exercises     What are brain exercises?  A brain exercise is any activity that engages your thinking (cognitive) skills.     What types of activities are considered brain exercises?  Jigsaw puzzles, crossword puzzles, word jumble, memory games, word search, and many more.  Many can be found free online or on your phone via a mobile alicia.     Why should I do brain exercises before my surgery?  More recent research has shown brain exercise before surgery can lower the risk of postoperative delirium (confusion) which can be especially important for older adults.  Patients who did brain exercises for 5 to 10 minutes/day in the days before surgery, cut their risk of postoperative delirium in half up to 1 week after surgery.                         [facility]     Preoperative Deep Breathing Exercises     Why it is important to do deep breathing exercises before my surgery?  Deep breathing exercises strengthen your breathing muscles.  This helps you to recover after your  surgery and decreases the chance of breathing complications.        How are the deep breathing exercises done?  Sit straight with your back supported.  Breathe in deeply and slowly through your nose. Your lower rib cage should expand and your abdomen may move forward.  Hold that breath for 3 to 5 seconds.  Breathe out through pursed lips, slowly and completely.  Rest and repeat 10 times every hour while awake.  Rest longer if you become dizzy or lightheaded.                      [facility]     Preoperative Deep Breathing Exercises     Why it is important to do deep breathing exercises before my surgery?  Deep breathing exercises strengthen your breathing muscles.  This helps you to recover after your surgery and decreases the chance of breathing complications.        How are the deep breathing exercises done?  Sit straight with your back supported.  Breathe in deeply and slowly through your nose. Your lower rib cage should expand and your abdomen may move forward.  Hold that breath for 3 to 5 seconds.  Breathe out through pursed lips, slowly and completely.  Rest and repeat 10 times every hour while awake.  Rest longer if you become dizzy or lightheaded.        Patient Information: Incentive Spirometer  What is an incentive spirometer?  An incentive spirometer is a device used before and after surgery to “exercise” your lungs.  It helps you to take deeper breaths to expand your lungs.  Below is an example of a basic incentive spirometer.  The device you receive may differ slightly but they all function the same.    Why do I need to use an incentive spirometer?  Using your incentive spirometer prepares your lungs for surgery and helps prevent lung problems after surgery.  How do I use my incentive spirometer?  When you're using your incentive spirometer, make sure to breathe through your mouth. If you breathe through your nose, the incentive spirometer won't work properly. You can hold your  nose if you have trouble.  If you feel dizzy at any time, stop and rest. Try again at a later time.  Follow the steps below:  Set up your incentive spirometer, expand the flexible tubing and connect to the outlet.  Sit upright in a chair or bed. Hold the incentive spirometer at eye level.   Put the mouthpiece in your mouth and close your lips tightly around it. Slowly breathe out (exhale) completely.  Breathe in (inhale) slowly through your mouth as deeply as you can. As you take a breath, you will see the piston rise inside the large column. While the piston rises, the indicator should move upwards. It should stay in between the 2 arrows (see Figure).  Try to get the piston as high as you can, while keeping the indicator between the arrows.   If the indicator doesn't stay between the arrows, you're breathing either too fast or too slow.  When you get it as high as you can, hold your breath for 10 seconds, or as long as possible. While you're holding your breath, the piston will slowly fall to the base of the spirometer.  Once the piston reaches the bottom of the spirometer, breathe out slowly through your mouth. Rest for a few seconds.  Repeat 10 times. Try to get the piston to the same level with each breath.  Repeat every hour while awake  You can carefully clean the outside of the mouthpiece with an alcohol wipe or soap and water.       Patient and Family Education             Ways You Can Help Prevent Blood Clots                    This handout explains some simple things you can do to help prevent blood clots.      Blood clots are blockages that can form in the body's veins. When a blood clot forms in your deep veins, it may be called a deep vein thrombosis, or DVT for short. Blood clots can happen in any part of the body where blood flows, but they are most common in the arms and legs. If a piece of a blood clot breaks free and travels to the lungs, it is called a pulmonary embolus (PE). A PE can be a very  serious problem.         Being in the hospital or having surgery can raise your chances of getting a blood clot because you may not be well enough to move around as much as you normally do.         Ways you can help prevent blood clots in the hospital           Wearing SCDs. SCDs stands for Sequential Compression Devices.   SCDs are special sleeves that wrap around your legs  They attach to a pump that fills them with air to gently squeeze your legs every few minutes.   This helps return the blood in your legs to your heart.   SCDs should only be taken off when walking or bathing.   SCDs may not be comfortable, but they can help save your life.                                            Wearing compression stockings - if your doctor orders them. These special snug fitting stockings gently squeeze your legs to help blood flow.       Walking. Walking helps move the blood in your legs.   If your doctor says it is ok, try walking the halls at least   5 times a day. Ask us to help you get up, so you don't fall.      Taking any blood thinning medicines your doctor orders.        Page 1 of 2            [facility]; 3/23   Ways you can help prevent blood clots at home         Wearing compression stockings - if your doctor orders them. ? Walking - to help move the blood in your legs.       Taking any blood thinning medicines your doctor orders.      Signs of a blood clot or PE        Tell your doctor or nurse know right away if you have of the problems listed below.    If you are at home, seek medical care right away. Call 911 for chest pain or problems breathing.                Signs of a blood clot (DVT) - such as pain,  swelling, redness or warmth in your arm or leg      Signs of a pulmonary embolism (PE) - such as chest pain or feeling short of breath    Patient Information: Pre-Operative Infection Prevention Measures     Why did I have my nose, under my arms, and groin swabbed?  The purpose of the swab is to  identify Staphylococcus aureus inside your nose or on your skin.  The swab was sent to the laboratory for culture.  A positive swab/culture for Staphylococcus aureus is called colonization or carriage.      What is Staphylococcus aureus?  Staphylococcus aureus, also known as “staph”, is a germ found on the skin or in the nose of healthy people.  Sometimes Staphylococcus aureus can get into the body and cause an infection.  This can be minor (such as pimples, boils, or other skin problems).  It might also be serious (such as a blood infection, pneumonia, or a surgical site infection).    What is Staphylococcus aureus colonization or carriage?  Colonization or carriage means that a person has the germ but is not sick from it.  These bacteria can be spread on the hands or when breathing or sneezing.    How is Staphylococcus aureus spread?  It is most often spread by close contact with a person or item that carries it.    What happens if my culture is positive for Staphylococcus aureus?  Your doctor/medical team will use this information to guide any antibiotic treatment which may be necessary.  Regardless of the culture results, we will clean the inside of your nose with a betadine swab just before you have your surgery.      Will I get an infection if I have Staphylococcus aureus in my nose or on my skin?  Anyone can get an infection with Staphylococcus aureus.  However, the best way to reduce your risk of infection is to follow the instructions provided to you for the use of your CHG soap and dental rinse.        Patient Information: Oral/Dental Rinse    What is oral/dental rinse?   It is a mouthwash. It is a way of cleaning the mouth with a germ-killing solution before your surgery.  The solution contains chlorhexidine, commonly known as CHG.   It is used inside the mouth to kill a bacteria known as Staphylococcus aureus.  Let your doctor know if you are allergic to Chlorhexidine.    We have sent a prescription for  CHG 0.12% mouthwash to your preferred pharmacy.  If you have not already, Please  your prescription and start using the day before before surgery.  Follow the instruction sheet provided to you at your CPM/PAT appointment. Please contact [facility] if you do not receive your CHG mouthwash prescription.     Why do I need to use CHG oral/dental rinse?  The CHG oral/dental rinse helps to kill a bacteria in your mouth known as Staphylococcus aureus.     This reduces the risk of infection at the surgical site.      Using your CHG oral/dental rinse  STEPS:  Use your CHG oral/dental rinse after you brush your teeth the night before (at bedtime) and the morning of your surgery.  Follow all directions on your prescription label.    Use the cap on the container to measure 15ml   Swish (gargle if you can) the mouthwash in your mouth for at least 30 seconds, (do not swallow) and spit out  After you use your CHG rinse, do not rinse your mouth with water, drink or eat.  Please refer to the prescription label for the appropriate time to resume oral intake      What side effects might I have using the CHG oral/dental rinse?  CHG rinse will stick to plaque on the teeth.  Brush and floss just before use.  Teeth brushing will help avoid staining of plaque during use.      Patient Information: Home Preoperative Antibacterial Shower      What is a home preoperative antibacterial shower?  This shower is a way of cleaning the skin with a germ-killing solution before surgery.  The solution contains chlorhexidine, commonly known as CHG.  CHG is a skin cleanser with germ-killing ability.  Let your doctor know if you are allergic to chlorhexidine.    Why do I need to take a preoperative antibacterial shower?  Skin is not sterile.  It is best to try to make your skin as free of germs as possible before surgery.  Proper cleansing with a germ-killing soap before surgery can lower the number of germs on your skin.  This helps to reduce the  risk of infection at the surgical site.  Following the instructions listed below will help you prepare your skin for surgery.      How do I use the solution?  Steps:  Begin using your CHG soap 5 days before your scheduled surgery on 4/10/25.    First, wash and rinse your hair using the CHG soap. Keep CHG soap away from ear canals and eyes.  Rinse completely, do not condition.  Hair extensions should be removed.  Wash your face with your normal soap and rinse.    Apply the CHG solution to a clean wet washcloth.  Turn the water off or move away from the water spray to avoid premature rinsing of the CHG soap as you are applying.   Firmly lather your entire body from the neck down.  Do not use on your face.  Pay special attention to the area(s) where your incision(s) will be located unless they are on your face.  Avoid scrubbing your skin too hard.  The important point is to have the CHG soap sit on your skin for 3 minutes.    When the 3 minutes are up, turn on the water and rinse the CHG solution off your body completely.   DO NOT wash with regular soap after you have used the CHG soap solution  Pat yourself dry with a clean, freshly-laundered towel.  DO NOT apply powders, deodorants, or lotions.  Dress in clean, freshly laundered nightclothes.    Be sure to sleep with clean, freshly laundered sheets.  Be aware that CHG will cause stains on fabrics; if you wash them with bleach after use.  Rinse your washcloth and other linens that have contact with CHG completely.  Use only non-chlorine detergents to launder the items used.   The morning of surgery is the fifth day.  Repeat the above steps and dress in clean comfortable clothing     Whom should I contact if I have any questions regarding the use of CHG soap?  Call the [facility] at [phone] if you have any questions.

## 2025-03-20 NOTE — H&P (VIEW-ONLY)
CPM/PAT Evaluation       Name: Tito Garcia (Tito Garcia)  /Age: 1950/74 y.o.     In-Person       Chief Complaint: Primary osteoarthritis of right knee    HPI  Patient is an alert and oriented 74 year old male scheduled for a  ARTHROPLASTY, KNEE, TOTAL, USING ROBOT-ASSISTED NAVIGATION (JOANN ROBOTIC ASSISTED on 25 with Dr. Raphael for  Primary osteoarthritis of right knee. PMHX includes HTN, HLD, Afib, OA. [place] [facility] [place] perioperative risk stratification and optimization.     Past Medical History:   Diagnosis Date    Arthritis     Cataract     Hyperlipidemia     Hypertension     Other bursal cyst, other site     Synovial cyst of lumbar spine       Past Surgical History:   Procedure Laterality Date    MR HEAD ANGIO WO IV CONTRAST  2021    MR HEAD ANGIO WO IV CONTRAST [facility]    OTHER SURGICAL HISTORY  11/10/2022    Carpal tunnel surgery    OTHER SURGICAL HISTORY  11/10/2022    Tonsillectomy    OTHER SURGICAL HISTORY  11/10/2022    Excision melanoma       Patient  reports that he is not currently sexually active and has had partner(s) who are female. He reports using the following method of birth control/protection: Condom Male.    Family History   Problem Relation Name Age of Onset    Hypertension Mother Digna Garcia     Other (Brain Tumor) Mother Digna Garcia     Diabetes Mother Digna Garcia     Other (Bladder Cancer) Father      Other (cyst on spine) Sister      Melanoma Brother         No Known Allergies    Prior to Admission medications    Medication Sig Start Date End Date Taking? Authorizing Provider   alpha tocopherol (Vitamin E) 670 mg (1,000 unit) capsule Take 1 capsule (1,000 Units) by mouth once daily. 11/10/22   Historical Provider, MD   amLODIPine (Norvasc) 10 mg tablet Take 1 tablet (10 mg) by mouth once daily. 25  Marc Carvajal DO   apixaban (Eliquis) 5 mg tablet Take 1 tablet (5 mg) by mouth 2 times a day. 25   Marc Carvajal DO   ascorbic  acid (Vitamin C) 1,000 mg tablet Take 1 tablet (1,000 mg) by mouth once daily. 11/10/22   Historical Provider, MD   atorvastatin (Lipitor) 10 mg tablet Take 1 tablet (10 mg) by mouth once daily. 1/8/25   Marc Carvajal DO   cyanocobalamin (Vitamin B-12) 250 mcg tablet Take 1 tablet (250 mcg) by mouth once daily.    Historical Provider, MD   flaxseed oiL 1,000 mg capsule Take 1 capsule (1,000 mg) by mouth once every 24 hours.    Historical Provider, MD   glucosam-chond-hyalu-CF borate ([facility]) 750 mg-100 mg- 1.65 mg-108 mg tablet Take by mouth. 11/10/22   Historical Provider, MD   losartan (Cozaar) 100 mg tablet Take 1 tablet (100 mg) by mouth once daily. 1/8/25 1/8/26  Marc Carvajal DO   metoprolol succinate XL (Toprol-XL) 50 mg 24 hr tablet Take 1 tablet (50 mg) by mouth once daily. 1/8/25   Marc Carvajal DO   multivitamin tablet Take 1 tablet by mouth once daily. 11/10/22   Historical Provider, MD   TURMERIC ORAL Take by mouth. 11/10/22   Historical Provider, MD         Review of Systems   Constitutional: Negative for chills, decreased appetite, diaphoresis, fever and malaise/fatigue.   Eyes:  Negative for blurred vision and double vision.   Cardiovascular:  Negative for chest pain, claudication, cyanosis, dyspnea on exertion, irregular heartbeat, leg swelling, near-syncope and palpitations.   Respiratory:  Negative for cough, hemoptysis, shortness of breath and wheezing.    Endocrine: Negative for cold intolerance, heat intolerance, polydipsia, polyphagia and polyuria.   Gastrointestinal:  Negative for abdominal pain, constipation, diarrhea, dysphagia, nausea and vomiting.   Genitourinary:  Negative for bladder incontinence, dysuria, hematuria, incomplete emptying, nocturia, frequency, pelvic pain and urgency.   Neurological:  Negative for headaches, light-headedness, paresthesias, sensory change and weakness.   Psychiatric/Behavioral:  Negative for altered mental status.    Musculoskeletal:  Positive for myalgias, arthralgias     Vitals and nursing note reviewed.     Physical exam  Constitutional:       Appearance: Normal appearance. He is Obese.   HENT:      Head: Normocephalic and atraumatic.      Mouth/Throat:      Mouth: Mucous membranes are moist.      Pharynx: Oropharynx is clear.   Eyes:      Extraocular Movements: Extraocular movements intact.      Conjunctiva/sclera: Conjunctivae normal.      Pupils: Pupils are equal, round, and reactive to light.   Cardiovascular:      PMI at left midclavicular line. Normal rate. Regular rhythm. Normal S1. Normal S2.       Murmurs: There is no murmur.      No gallop.  No click. No rub.       No audible carotid bruit     No lower extremity edema on exam  Pulmonary:      Effort: Pulmonary effort is normal.      Breath sounds: Normal breath sounds.   Abdominal:      General: Abdomen is flat. Bowel sounds are normal.      Palpations: Abdomen is soft and non-tender  Musculoskeletal:      Cervical back: Normal range of motion and neck supple.   Skin:     General: Skin is warm and dry.      Capillary Refill: Capillary refill takes less than 2 seconds.   Neurological:      General: No focal deficit present.      Mental Status: He is alert and oriented to person, place, and time. Mental status is at baseline.   Psychiatric:         Mood and Affect: Mood normal.         Behavior: Behavior normal.         Thought Content: Thought content normal.         Judgment: Judgment normal.     Vitals and nursing note reviewed. Physical exam within normal limits.       DASI Risk Score      Flowsheet Row Questionnaire Series Submission from 2/12/2025 in [facility] with Generic Provider Dora   Can you take care of yourself (eat, dress, bathe, or use toilet)?  2.75  filed at 02/12/2025 2212   Can you walk indoors, such as around your house? 1.75  filed at 02/12/2025 2212   Can you walk a block or two on level ground?  2.75  filed at 02/12/2025 2212   Can you climb a flight of  stairs or walk up a hill? 5.5  filed at 02/12/2025 2212   Can you run a short distance? 0  filed at 02/12/2025 2212   Can you do light work around the house like dusting or washing dishes? 2.7  filed at 02/12/2025 2212   Can you do moderate work around the house like vacuuming, sweeping floors or carrying groceries? 3.5  filed at 02/12/2025 2212   Can you do heavy work around the house like scrubbing floors or lifting and moving heavy furniture?  0  filed at 02/12/2025 2212   Can you do yard work like raking leaves, weeding or pushing a mower? 0  filed at 02/12/2025 2212   Can you have sexual relations? 0  filed at 02/12/2025 2212   Can you participate in moderate recreational activities like golf, bowling, dancing, doubles tennis or throwing a baseball or football? 0  filed at 02/12/2025 2212   Can you participate in strenous sports like swimming, singles tennis, football, basketball, or skiing? 7.5  filed at 02/12/2025 2212   DASI SCORE 26.45  filed at 02/12/2025 2212   METS Score (Will be calculated only when all the questions are answered) 6  filed at 02/12/2025 2212          [facility]      Flowsheet Row Pre-Admission Testing from 3/20/2025 in [facility] CARDIOVERSION EXTERNAL from 7/30/2024 in [facility] with Johnson Champion MD   DVT Score (IF A SCORE IS NOT CALCULATING, MUST SELECT A BMI TO COMPLETE) 11 filed at 03/20/2025 1150 5 filed at 07/30/2024 0847   Medical Factors Swollen legs filed at 03/20/2025 1150 --   Surgical Factors Elective major lower extremity arthroplasty filed at 03/20/2025 1150 --   BMI (BMI MUST BE CHOSEN) 31-40 (Obesity) filed at 03/20/2025 1150 31-40 (Obesity) filed at 07/30/2024 0847   RETIRED: Age -- 60-75 years filed at 07/30/2024 0847          Modified Frailty Index    No data to display       [id number] Stroke Risk Points  Current as of 12 minutes ago        3 0 to 9 Points:      Last Change:           The [id number] risk score  (Lip CARMINE, et al. 2009. © 2010 American College [employer]) quantifies the risk of stroke for a patient with atrial fibrillation. For patients without atrial fibrillation or under the age of 18 this score appears as N/A. Higher score values generally indicate higher risk of stroke.          Points Metrics   0 Has Congestive Heart Failure:  No     Patients with congestive heart failure get 1 point.    Current as of 12 minutes ago   1 Has Hypertension:  Yes     Patients with hypertension get 1 point.    Current as of 12 minutes ago   1 Age:  74     Patients 65 to 74 years old get 1 point, or patients 75 years and older get 2 points.    Current as of 12 minutes ago   0 Has Diabetes:  No     Patients with diabetes get 1 point.    Current as of 12 minutes ago   0 Had Stroke:  No  Had TIA:  No  Had Thromboembolism:  No     Patients who have had a stroke, TIA, or thromboembolism get 2 points.    Current as of 12 minutes ago   1 Has Vascular Disease:  Yes     Patients with vascular disease get 1 point.    Current as of 12 minutes ago   0 Clinically Relevant Sex:  Male     Patients with a clinically relevant sex of Female get 1 point.    Current as of 12 minutes ago             Revised Cardiac Risk Index      Flowsheet Row Pre-Admission Testing from 3/20/2025 in [facility]   High-Risk Surgery (Intraperitoneal, Intrathoracic,Suprainguinal vascular) 0 filed at 03/20/2025 1151   History of ischemic heart disease (History of MI, History of positive exercuse test, Current chest paint considered due to myocardial ischemia, Use of nitrate therapy, ECG with pathological Q Waves) 0 filed at 03/20/2025 1151   History of congestive heart failure (pulmonary edemia, bilateral rales or S3 gallop, Paroxysmal nocturnal dyspnea, CXR showing pulmonary vascular redistribution) 0 filed at 03/20/2025 1151   History of cerebrovascular disease (Prior TIA or stroke) 0 filed at 03/20/2025 1151   Pre-operative insulin  treatment 0 filed at 03/20/2025 1151   Pre-operative creatinine>2 mg/dl 0 filed at 03/20/2025 1151   Revised Cardiac Risk Calculator 0 filed at 03/20/2025 1151          Apfel Simplified Score    No data to display       Risk Analysis Index Results This Encounter    No data found in the last 10 encounters.       Stop Bang Score      Flowsheet Row Pre-Admission Testing from 3/20/2025 in [facility] Questionnaire Series Submission from 2/12/2025 in [facility] with Generic Provider Dora   Do you snore loudly? 0 filed at 03/20/2025 1047 0  filed at 02/12/2025 2212   Do you often feel tired or fatigued after your sleep? 0 filed at 03/20/2025 1047 0  filed at 02/12/2025 2212   Has anyone ever observed you stop breathing in your sleep? 0 filed at 03/20/2025 1047 0  filed at 02/12/2025 2212   Do you have or are you being treated for high blood pressure? 1 filed at 03/20/2025 1047 1  filed at 02/12/2025 2212   Recent BMI (Calculated) 37.2 filed at 03/20/2025 1047 37  filed at 02/12/2025 2212   Is BMI greater than 35 kg/m2? 1=Yes filed at 03/20/2025 1047 1=Yes  filed at 02/12/2025 2212   Age older than 50 years old? 1=Yes filed at 03/20/2025 1047 1=Yes  filed at 02/12/2025 2212   Is your neck circumference greater than 17 inches (Male) or 16 inches (Female)? 1 filed at 03/20/2025 1047 --   Gender - Male 1=Yes filed at 03/20/2025 1047 1=Yes  filed at 02/12/2025 2212   STOP-BANG Total Score 5 filed at 03/20/2025 1047 --          Prodigy: High Risk  Total Score: 20              Prodigy Age Score      Prodigy Gender Score          ARISCAT Score for Postoperative Pulmonary Complications    No data to display       Gu Perioperative Risk for Myocardial Infarction or Cardiac Arrest (ANNA)      Flowsheet Row Pre-Admission Testing from 3/20/2025 in [facility]   Calculated Age Score 1.48 filed at 03/20/2025 1151   Functional Status  0 filed at 03/20/2025 1151   ASA Class  -1.92 filed at 03/20/2025  1151   Creatinine 0 filed at 03/20/2025 1151   Type of Procedure  0.80 filed at 03/20/2025 1151   ANNA Total Score  -4.89 filed at 03/20/2025 1151   ANNA % 0.75 filed at 03/20/2025 1151              Assessment & Plan:    Neuro:  No diagnosis or significant findings on chart review or clinical presentation and evaluation.     HEENT/Airway:  No diagnosis or significant findings on chart review or clinical presentation and evaluation.   STOP-BANG Score-5  points high risk for TAHIRA    Mallampati::  III    TM distance::  >3 FB    Neck ROM::  Full  Dentures-denies  Crowns-reports  Implants-reports    Cardiovascular:  HTN, HLD, Afib (amlodipine, losartan, metoprolol, eliquis, atorvastatin)   S/p RFA   METS: 6  RCRI: 0 points, 3.9%  risk for postoperative MACE   ANNA: 0.75% risk for postoperative MACE  EKG -Normal sinus rhythm  Normal ECG  When compared with ECG of 30-SEP-2024 09:53,  No significant change was found  Confirmed by Johnson Champion ([id number]) on 11/14/2024 11:38:35 AM    TTE CONCLUSIONS:7/17/24   1. The left ventricular systolic function is normal, with a visually estimated ejection fraction of 55-60%.   2. There is normal right ventricular global systolic function.   3. The left atrium is moderate to severely dilated.   4. The right atrium is moderately to severely dilated.   5. Mild mitral valve regurgitation.   6. Mild tricuspid regurgitation visualized.   7. Slightly elevated RVSP.   8. The estimated PASP is 38 mmHg.   9. The inferior vena cava appears moderately dilated.    Cardioversion Summary: 7/30/24   Patient was successfully cardioverted from atrial fibrillation to Sinus rhythm.  Patient did no convert until the third 200J synchronized shock.     Pulmonary:  No diagnosis or significant findings on chart review or clinical presentation and evaluation.   ARISCAT: <26 points, 1.6% risk of in-hospital postoperative pulmonary complication  PRODIGY: Moderate risk for opioid induced respiratory  depression  Smoking History- he quit >35 years ago   Deep breathing handout given    Renal/Urinary:    No diagnosis or significant findings on chart review or clinical presentation and evaluation, however, the patient is at increased risk of perioperative renal complications secondary to HTN. Preventative measures include BP monitoring, medication compliance, and hydration management.   CMP  Creatinine-0.91  GFR-88    Endocrine:  No diagnosis or significant findings on chart review or clinical presentation and evaluation.   [id number]    Hematologic/Immunology:  No diagnosis or significant findings on chart review or clinical presentation and evaluation.  The patient is not a Jehovah’s witness and will accept blood and blood products if medically indicated.   History of previous blood transfusions No  CBC  H/H 13.3/40.3  Caprini Score 11, patient at High for postoperative DVT. Pt supplied education/VTE handout  Anticoagulation use: Yes     Gastrointestinal:   No diagnosis or significant findings on chart review or clinical presentation and evaluation.   Recreational drug use: none  Alcohol use none    Infectious disease:   No diagnosis or significant findings on chart review or clinical presentation and evaluation.   Prescription provided for CHG body wash and dental rinse. CHG use instructions reviewed and provided to patient.  Staph screen collected-Pending    Musculoskeletal:   OA bilateral knee pain  JHFRAT score-7  points. moderate risk for falls    Anesthesia:  ASA 3 - Patient with moderate systemic disease with functional limitations  Anticipated anesthesia-spinal  History of General anesthesia- yes  Complications- No anesthesia complications  No family history of anesthesia complications    Labs & Imaging ordered:  A1c, MRSA  Nickel/metal allergy-negative  Shellfish allergy-negative    Discussed with patient medication instructions, NPO guidelines, and any questions or concerns.   Will fax Dr. Champion  for clearance and Eliquis instructions   Clearance received and ok to stop Eliquis 3 days prior to surgery, pt called and he verbalized understanding   time spent 45 minutes

## 2025-03-20 NOTE — CPM/PAT H&P
CPM/PAT Evaluation       Name: Tito Garcia (Tito Garcia)  /Age: 1950/74 y.o.     In-Person       Chief Complaint: Primary osteoarthritis of right knee    HPI  Patient is an alert and oriented 74 year old male scheduled for a  ARTHROPLASTY, KNEE, TOTAL, USING ROBOT-ASSISTED NAVIGATION (JOANN ROBOTIC ASSISTED on 25 with Dr. Raphael for  Primary osteoarthritis of right knee. PMHX includes HTN, HLD, Afib, OA. Presents to [facility] PAT today for perioperative risk stratification and optimization.     Past Medical History:   Diagnosis Date    Arthritis     Cataract     Hyperlipidemia     Hypertension     Other bursal cyst, other site     Synovial cyst of lumbar spine       Past Surgical History:   Procedure Laterality Date    MR HEAD ANGIO WO IV CONTRAST  2021    MR HEAD ANGIO WO IV CONTRAST [facility]    OTHER SURGICAL HISTORY  11/10/2022    Carpal tunnel surgery    OTHER SURGICAL HISTORY  11/10/2022    Tonsillectomy    OTHER SURGICAL HISTORY  11/10/2022    Excision melanoma       Patient  reports that he is not currently sexually active and has had partner(s) who are female. He reports using the following method of birth control/protection: Condom Male.    Family History   Problem Relation Name Age of Onset    Hypertension Mother Digna Garcia     Other (Brain Tumor) Mother Digna Garcia     Diabetes Mother Digna Garcia     Other (Bladder Cancer) Father      Other (cyst on spine) Sister      Melanoma Brother         No Known Allergies    Prior to Admission medications    Medication Sig Start Date End Date Taking? Authorizing Provider   alpha tocopherol (Vitamin E) 670 mg (1,000 unit) capsule Take 1 capsule (1,000 Units) by mouth once daily. 11/10/22   Historical Provider, MD   amLODIPine (Norvasc) 10 mg tablet Take 1 tablet (10 mg) by mouth once daily. 25  Marc Carvajal DO   apixaban (Eliquis) 5 mg tablet Take 1 tablet (5 mg) by mouth 2 times a day. 25   Marc Carvajal DO   ascorbic  acid (Vitamin C) 1,000 mg tablet Take 1 tablet (1,000 mg) by mouth once daily. 11/10/22   Historical Provider, MD   atorvastatin (Lipitor) 10 mg tablet Take 1 tablet (10 mg) by mouth once daily. 1/8/25   Marc Carvajal DO   cyanocobalamin (Vitamin B-12) 250 mcg tablet Take 1 tablet (250 mcg) by mouth once daily.    Historical Provider, MD   flaxseed oiL 1,000 mg capsule Take 1 capsule (1,000 mg) by mouth once every 24 hours.    Historical Provider, MD   glucosam-chond-hyalu-CF borate ([facility]) 750 mg-100 mg- 1.65 mg-108 mg tablet Take by mouth. 11/10/22   Historical Provider, MD   losartan (Cozaar) 100 mg tablet Take 1 tablet (100 mg) by mouth once daily. 1/8/25 1/8/26  Marc Carvajal DO   metoprolol succinate XL (Toprol-XL) 50 mg 24 hr tablet Take 1 tablet (50 mg) by mouth once daily. 1/8/25   Marc Carvajal DO   multivitamin tablet Take 1 tablet by mouth once daily. 11/10/22   Historical Provider, MD   TURMERIC ORAL Take by mouth. 11/10/22   Historical Provider, MD         Review of Systems   Constitutional: Negative for chills, decreased appetite, diaphoresis, fever and malaise/fatigue.   Eyes:  Negative for blurred vision and double vision.   Cardiovascular:  Negative for chest pain, claudication, cyanosis, dyspnea on exertion, irregular heartbeat, leg swelling, near-syncope and palpitations.   Respiratory:  Negative for cough, hemoptysis, shortness of breath and wheezing.    Endocrine: Negative for cold intolerance, heat intolerance, polydipsia, polyphagia and polyuria.   Gastrointestinal:  Negative for abdominal pain, constipation, diarrhea, dysphagia, nausea and vomiting.   Genitourinary:  Negative for bladder incontinence, dysuria, hematuria, incomplete emptying, nocturia, frequency, pelvic pain and urgency.   Neurological:  Negative for headaches, light-headedness, paresthesias, sensory change and weakness.   Psychiatric/Behavioral:  Negative for altered mental status.    Musculoskeletal:  Positive for myalgias, arthralgias     Vitals and nursing note reviewed.     Physical exam  Constitutional:       Appearance: Normal appearance. He is Obese.   HENT:      Head: Normocephalic and atraumatic.      Mouth/Throat:      Mouth: Mucous membranes are moist.      Pharynx: Oropharynx is clear.   Eyes:      Extraocular Movements: Extraocular movements intact.      Conjunctiva/sclera: Conjunctivae normal.      Pupils: Pupils are equal, round, and reactive to light.   Cardiovascular:      PMI at left midclavicular line. Normal rate. Regular rhythm. Normal S1. Normal S2.       Murmurs: There is no murmur.      No gallop.  No click. No rub.       No audible carotid bruit     No lower extremity edema on exam  Pulmonary:      Effort: Pulmonary effort is normal.      Breath sounds: Normal breath sounds.   Abdominal:      General: Abdomen is flat. Bowel sounds are normal.      Palpations: Abdomen is soft and non-tender  Musculoskeletal:      Cervical back: Normal range of motion and neck supple.   Skin:     General: Skin is warm and dry.      Capillary Refill: Capillary refill takes less than 2 seconds.   Neurological:      General: No focal deficit present.      Mental Status: He is alert and oriented to person, place, and time. Mental status is at baseline.   Psychiatric:         Mood and Affect: Mood normal.         Behavior: Behavior normal.         Thought Content: Thought content normal.         Judgment: Judgment normal.     Vitals and nursing note reviewed. Physical exam within normal limits.       DASI Risk Score      Flowsheet Row Questionnaire Series Submission from 2/12/2025 in [facility] with Generic Provider Dora   Can you take care of yourself (eat, dress, bathe, or use toilet)?  2.75  filed at 02/12/2025 2212   Can you walk indoors, such as around your house? 1.75  filed at 02/12/2025 2212   Can you walk a block or two on level ground?  2.75  filed at 02/12/2025 2212   Can you climb a flight of  stairs or walk up a hill? 5.5  filed at 02/12/2025 2212   Can you run a short distance? 0  filed at 02/12/2025 2212   Can you do light work around the house like dusting or washing dishes? 2.7  filed at 02/12/2025 2212   Can you do moderate work around the house like vacuuming, sweeping floors or carrying groceries? 3.5  filed at 02/12/2025 2212   Can you do heavy work around the house like scrubbing floors or lifting and moving heavy furniture?  0  filed at 02/12/2025 2212   Can you do yard work like raking leaves, weeding or pushing a mower? 0  filed at 02/12/2025 2212   Can you have sexual relations? 0  filed at 02/12/2025 2212   Can you participate in moderate recreational activities like golf, bowling, dancing, doubles tennis or throwing a baseball or football? 0  filed at 02/12/2025 2212   Can you participate in strenous sports like swimming, singles tennis, football, basketball, or skiing? 7.5  filed at 02/12/2025 2212   DASI SCORE 26.45  filed at 02/12/2025 2212   METS Score (Will be calculated only when all the questions are answered) 6  filed at 02/12/2025 2212          [facility]      Flowsheet Row Pre-Admission Testing from 3/20/2025 in [facility] CARDIOVERSION EXTERNAL from 7/30/2024 in [facility] with Johnson Champion MD   DVT Score (IF A SCORE IS NOT CALCULATING, MUST SELECT A BMI TO COMPLETE) 11 filed at 03/20/2025 1150 5 filed at 07/30/2024 0847   Medical Factors Swollen legs filed at 03/20/2025 1150 --   Surgical Factors Elective major lower extremity arthroplasty filed at 03/20/2025 1150 --   BMI (BMI MUST BE CHOSEN) 31-40 (Obesity) filed at 03/20/2025 1150 31-40 (Obesity) filed at 07/30/2024 0847   RETIRED: Age -- 60-75 years filed at 07/30/2024 0847          Modified Frailty Index    No data to display       [id number] Stroke Risk Points  Current as of 12 minutes ago        3 0 to 9 Points:      Last Change:           The [id number] risk score  (Lip CARMINE, et al. 2009. © 2010 American College [employer]) quantifies the risk of stroke for a patient with atrial fibrillation. For patients without atrial fibrillation or under the age of 18 this score appears as N/A. Higher score values generally indicate higher risk of stroke.          Points Metrics   0 Has Congestive Heart Failure:  No     Patients with congestive heart failure get 1 point.    Current as of 12 minutes ago   1 Has Hypertension:  Yes     Patients with hypertension get 1 point.    Current as of 12 minutes ago   1 Age:  74     Patients 65 to 74 years old get 1 point, or patients 75 years and older get 2 points.    Current as of 12 minutes ago   0 Has Diabetes:  No     Patients with diabetes get 1 point.    Current as of 12 minutes ago   0 Had Stroke:  No  Had TIA:  No  Had Thromboembolism:  No     Patients who have had a stroke, TIA, or thromboembolism get 2 points.    Current as of 12 minutes ago   1 Has Vascular Disease:  Yes     Patients with vascular disease get 1 point.    Current as of 12 minutes ago   0 Clinically Relevant Sex:  Male     Patients with a clinically relevant sex of Female get 1 point.    Current as of 12 minutes ago             Revised Cardiac Risk Index      Flowsheet Row Pre-Admission Testing from 3/20/2025 in [facility]   High-Risk Surgery (Intraperitoneal, Intrathoracic,Suprainguinal vascular) 0 filed at 03/20/2025 1151   History of ischemic heart disease (History of MI, History of positive exercuse test, Current chest paint considered due to myocardial ischemia, Use of nitrate therapy, ECG with pathological Q Waves) 0 filed at 03/20/2025 1151   History of congestive heart failure (pulmonary edemia, bilateral rales or S3 gallop, Paroxysmal nocturnal dyspnea, CXR showing pulmonary vascular redistribution) 0 filed at 03/20/2025 1151   History of cerebrovascular disease (Prior TIA or stroke) 0 filed at 03/20/2025 1151   Pre-operative insulin  treatment 0 filed at 03/20/2025 1151   Pre-operative creatinine>2 mg/dl 0 filed at 03/20/2025 1151   Revised Cardiac Risk Calculator 0 filed at 03/20/2025 1151          Apfel Simplified Score    No data to display       Risk Analysis Index Results This Encounter    No data found in the last 10 encounters.       Stop Bang Score      Flowsheet Row Pre-Admission Testing from 3/20/2025 in [facility] Questionnaire Series Submission from 2/12/2025 in [facility] with Generic Provider Dora   Do you snore loudly? 0 filed at 03/20/2025 1047 0  filed at 02/12/2025 2212   Do you often feel tired or fatigued after your sleep? 0 filed at 03/20/2025 1047 0  filed at 02/12/2025 2212   Has anyone ever observed you stop breathing in your sleep? 0 filed at 03/20/2025 1047 0  filed at 02/12/2025 2212   Do you have or are you being treated for high blood pressure? 1 filed at 03/20/2025 1047 1  filed at 02/12/2025 2212   Recent BMI (Calculated) 37.2 filed at 03/20/2025 1047 37  filed at 02/12/2025 2212   Is BMI greater than 35 kg/m2? 1=Yes filed at 03/20/2025 1047 1=Yes  filed at 02/12/2025 2212   Age older than 50 years old? 1=Yes filed at 03/20/2025 1047 1=Yes  filed at 02/12/2025 2212   Is your neck circumference greater than 17 inches (Male) or 16 inches (Female)? 1 filed at 03/20/2025 1047 --   Gender - Male 1=Yes filed at 03/20/2025 1047 1=Yes  filed at 02/12/2025 2212   STOP-BANG Total Score 5 filed at 03/20/2025 1047 --          Prodigy: High Risk  Total Score: 20              Prodigy Age Score      Prodigy Gender Score          ARISCAT Score for Postoperative Pulmonary Complications    No data to display       Gu Perioperative Risk for Myocardial Infarction or Cardiac Arrest (ANNA)      Flowsheet Row Pre-Admission Testing from 3/20/2025 in [facility]   Calculated Age Score 1.48 filed at 03/20/2025 1151   Functional Status  0 filed at 03/20/2025 1151   ASA Class  -1.92 filed at 03/20/2025  1151   Creatinine 0 filed at 03/20/2025 1151   Type of Procedure  0.80 filed at 03/20/2025 1151   ANNA Total Score  -4.89 filed at 03/20/2025 1151   ANNA % 0.75 filed at 03/20/2025 1151              Assessment & Plan:    Neuro:  No diagnosis or significant findings on chart review or clinical presentation and evaluation.     HEENT/Airway:  No diagnosis or significant findings on chart review or clinical presentation and evaluation.   STOP-BANG Score-5  points high risk for TAHIRA    Mallampati::  III    TM distance::  >3 FB    Neck ROM::  Full  Dentures-denies  Crowns-reports  Implants-reports    Cardiovascular:  HTN, HLD, Afib (amlodipine, losartan, metoprolol, eliquis, atorvastatin)   S/p RFA   METS: 6  RCRI: 0 points, 3.9%  risk for postoperative MACE   ANNA: 0.75% risk for postoperative MACE  EKG -Normal sinus rhythm  Normal ECG  When compared with ECG of 30-SEP-2024 09:53,  No significant change was found  Confirmed by Johnson Champion ([id number]) on 11/14/2024 11:38:35 AM    TTE CONCLUSIONS:7/17/24   1. The left ventricular systolic function is normal, with a visually estimated ejection fraction of 55-60%.   2. There is normal right ventricular global systolic function.   3. The left atrium is moderate to severely dilated.   4. The right atrium is moderately to severely dilated.   5. Mild mitral valve regurgitation.   6. Mild tricuspid regurgitation visualized.   7. Slightly elevated RVSP.   8. The estimated PASP is 38 mmHg.   9. The inferior vena cava appears moderately dilated.    Cardioversion Summary: 7/30/24   Patient was successfully cardioverted from atrial fibrillation to Sinus rhythm.  Patient did no convert until the third 200J synchronized shock.     Pulmonary:  No diagnosis or significant findings on chart review or clinical presentation and evaluation.   ARISCAT: <26 points, 1.6% risk of in-hospital postoperative pulmonary complication  PRODIGY: Moderate risk for opioid induced respiratory  depression  Smoking History- he quit >35 years ago   Deep breathing handout given    Renal/Urinary:    No diagnosis or significant findings on chart review or clinical presentation and evaluation, however, the patient is at increased risk of perioperative renal complications secondary to HTN. Preventative measures include BP monitoring, medication compliance, and hydration management.   CMP  Creatinine-0.91  GFR-88    Endocrine:  No diagnosis or significant findings on chart review or clinical presentation and evaluation.   [id number]    Hematologic/Immunology:  No diagnosis or significant findings on chart review or clinical presentation and evaluation.  The patient is not a Jehovah’s witness and will accept blood and blood products if medically indicated.   History of previous blood transfusions No  CBC  H/H 13.3/40.3  Caprini Score 11, patient at High for postoperative DVT. Pt supplied education/VTE handout  Anticoagulation use: Yes     Gastrointestinal:   No diagnosis or significant findings on chart review or clinical presentation and evaluation.   Recreational drug use: none  Alcohol use none    Infectious disease:   No diagnosis or significant findings on chart review or clinical presentation and evaluation.   Prescription provided for CHG body wash and dental rinse. CHG use instructions reviewed and provided to patient.  Staph screen collected-Pending    Musculoskeletal:   OA bilateral knee pain  JHFRAT score-7  points. moderate risk for falls    Anesthesia:  ASA 3 - Patient with moderate systemic disease with functional limitations  Anticipated anesthesia-spinal  History of General anesthesia- yes  Complications- No anesthesia complications  No family history of anesthesia complications    Labs & Imaging ordered:  A1c, MRSA  Nickel/metal allergy-negative  Shellfish allergy-negative    Discussed with patient medication instructions, NPO guidelines, and any questions or concerns.   Will fax Dr. Champion  for clearance and Eliquis instructions   time spent 45 minutes

## 2025-03-22 LAB — STAPHYLOCOCCUS SPEC CULT: NORMAL

## 2025-03-24 ENCOUNTER — TELEPHONE (OUTPATIENT)
Dept: CARDIOLOGY | Facility: CLINIC | Age: 75
End: 2025-03-24
Payer: MEDICARE

## 2025-03-24 ENCOUNTER — APPOINTMENT (OUTPATIENT)
Dept: PREADMISSION TESTING | Facility: HOSPITAL | Age: 75
End: 2025-03-24
Payer: MEDICARE

## 2025-03-25 ENCOUNTER — APPOINTMENT (OUTPATIENT)
Dept: PREADMISSION TESTING | Facility: HOSPITAL | Age: 75
End: 2025-03-25
Payer: MEDICARE

## 2025-03-25 ENCOUNTER — APPOINTMENT (OUTPATIENT)
Dept: ORTHOPEDIC SURGERY | Facility: HOSPITAL | Age: 75
End: 2025-03-25
Payer: MEDICARE

## 2025-03-28 ENCOUNTER — LAB (OUTPATIENT)
Dept: LAB | Facility: HOSPITAL | Age: 75
End: 2025-03-28
Payer: MEDICARE

## 2025-03-28 ENCOUNTER — LAB REQUISITION (OUTPATIENT)
Dept: LAB | Facility: HOSPITAL | Age: 75
End: 2025-03-28
Payer: MEDICARE

## 2025-03-28 DIAGNOSIS — Z01.818 ENCOUNTER FOR OTHER PREPROCEDURAL EXAMINATION: ICD-10-CM

## 2025-03-28 DIAGNOSIS — R73.9 ELEVATED BLOOD SUGAR: ICD-10-CM

## 2025-03-28 DIAGNOSIS — Z01.818 PREOPERATIVE TESTING: ICD-10-CM

## 2025-03-28 LAB
ABO GROUP (TYPE) IN BLOOD: NORMAL
ANTIBODY SCREEN: NORMAL
BASOPHILS # BLD AUTO: 0.04 X10*3/UL (ref 0–0.1)
BASOPHILS NFR BLD AUTO: 0.6 %
EOSINOPHIL # BLD AUTO: 0.19 X10*3/UL (ref 0–0.4)
EOSINOPHIL NFR BLD AUTO: 2.8 %
ERYTHROCYTE [DISTWIDTH] IN BLOOD BY AUTOMATED COUNT: 12.4 % (ref 11.5–14.5)
EST. AVERAGE GLUCOSE BLD GHB EST-MCNC: 120 MG/DL
HBA1C MFR BLD: 5.8 %
HCT VFR BLD AUTO: 38.3 % (ref 41–52)
HGB BLD-MCNC: 13.1 G/DL (ref 13.5–17.5)
HOLD SPECIMEN: NORMAL
IMM GRANULOCYTES # BLD AUTO: 0.03 X10*3/UL (ref 0–0.5)
IMM GRANULOCYTES NFR BLD AUTO: 0.4 % (ref 0–0.9)
LYMPHOCYTES # BLD AUTO: 1.8 X10*3/UL (ref 0.8–3)
LYMPHOCYTES NFR BLD AUTO: 27 %
MCH RBC QN AUTO: 29.8 PG (ref 26–34)
MCHC RBC AUTO-ENTMCNC: 34.2 G/DL (ref 32–36)
MCV RBC AUTO: 87 FL (ref 80–100)
MONOCYTES # BLD AUTO: 0.7 X10*3/UL (ref 0.05–0.8)
MONOCYTES NFR BLD AUTO: 10.5 %
NEUTROPHILS # BLD AUTO: 3.91 X10*3/UL (ref 1.6–5.5)
NEUTROPHILS NFR BLD AUTO: 58.7 %
NRBC BLD-RTO: 0 /100 WBCS (ref 0–0)
PLATELET # BLD AUTO: 265 X10*3/UL (ref 150–450)
RBC # BLD AUTO: 4.39 X10*6/UL (ref 4.5–5.9)
RH FACTOR (ANTIGEN D): NORMAL
WBC # BLD AUTO: 6.7 X10*3/UL (ref 4.4–11.3)

## 2025-03-28 PROCEDURE — 86901 BLOOD TYPING SEROLOGIC RH(D): CPT

## 2025-03-28 PROCEDURE — 86850 RBC ANTIBODY SCREEN: CPT

## 2025-03-28 PROCEDURE — 83036 HEMOGLOBIN GLYCOSYLATED A1C: CPT

## 2025-03-28 PROCEDURE — 86900 BLOOD TYPING SEROLOGIC ABO: CPT

## 2025-03-28 PROCEDURE — 85025 COMPLETE CBC W/AUTO DIFF WBC: CPT

## 2025-04-09 ENCOUNTER — TELEPHONE (OUTPATIENT)
Dept: ORTHOPEDIC SURGERY | Facility: HOSPITAL | Age: 75
End: 2025-04-09
Payer: MEDICARE

## 2025-04-09 NOTE — TELEPHONE ENCOUNTER
Thank you for taking my call today.  All questions were answered at the time of the call, but please feel free to reach out to me via MyChart or phone, [phone], with any new questions or concerns.       We confirmed that you opted to enroll in our [id number] program so your discharge prescriptions will be available to take home at the time of discharge.  Please bring any prescription insurance coverage with you on the morning of surgery so that we can enter the information into our system.     We confirmed that your plan would be to Stay Overnight.    We confirmed that you have DME needed for recovery.     Use the provided body wash for 4 days before surgery and complete a 5th shower on the morning of surgery, this includes your body and hair.  Follow the directions as provided during preadmission testing.  The mouth wash will be used the night before and the morning of surgery.       As a reminder, if you do not hear from our team, please call [phone] between 2pm and 3pm the business day before your surgery to confirm your arrival time and details.        Please don't hesitate to reach out with additional questions or concerns.    Kalli Bryan MBA, BSN, RN-BC, ONC  KEVIN Templeton, RN  Kelsey Melendez, RN  Orthopedic Program Navigators  [facility]  [phone]

## 2025-04-13 PROBLEM — Z96.651 STATUS POST TOTAL KNEE REPLACEMENT, RIGHT: Status: ACTIVE | Noted: 2025-04-13

## 2025-04-13 RX ORDER — NALOXONE HYDROCHLORIDE 0.4 MG/ML
0.2 INJECTION, SOLUTION INTRAMUSCULAR; INTRAVENOUS; SUBCUTANEOUS EVERY 5 MIN PRN
Status: CANCELLED | OUTPATIENT
Start: 2025-04-13

## 2025-04-13 RX ORDER — DOCUSATE SODIUM 100 MG/1
100 CAPSULE, LIQUID FILLED ORAL 2 TIMES DAILY
Status: CANCELLED | OUTPATIENT
Start: 2025-04-14

## 2025-04-13 RX ORDER — MORPHINE SULFATE 2 MG/ML
2 INJECTION, SOLUTION INTRAMUSCULAR; INTRAVENOUS EVERY 2 HOUR PRN
Status: CANCELLED | OUTPATIENT
Start: 2025-04-13

## 2025-04-13 RX ORDER — TRANEXAMIC ACID 100 MG/ML
1000 INJECTION, SOLUTION INTRAVENOUS 2 TIMES DAILY
Status: CANCELLED | OUTPATIENT
Start: 2025-04-14 | End: 2025-04-14

## 2025-04-13 RX ORDER — DIPHENHYDRAMINE HYDROCHLORIDE 50 MG/ML
12.5 INJECTION, SOLUTION INTRAMUSCULAR; INTRAVENOUS EVERY 6 HOURS PRN
Status: CANCELLED | OUTPATIENT
Start: 2025-04-13

## 2025-04-13 RX ORDER — KETOROLAC TROMETHAMINE 15 MG/ML
15 INJECTION, SOLUTION INTRAMUSCULAR; INTRAVENOUS EVERY 6 HOURS
Status: CANCELLED | OUTPATIENT
Start: 2025-04-14 | End: 2025-04-14

## 2025-04-13 RX ORDER — OXYCODONE HYDROCHLORIDE 5 MG/1
5 TABLET ORAL EVERY 6 HOURS PRN
Status: CANCELLED | OUTPATIENT
Start: 2025-04-13

## 2025-04-13 RX ORDER — ACETAMINOPHEN 325 MG/1
975 TABLET ORAL EVERY 6 HOURS SCHEDULED
Status: CANCELLED | OUTPATIENT
Start: 2025-04-14

## 2025-04-13 RX ORDER — ONDANSETRON 4 MG/1
4 TABLET, FILM COATED ORAL EVERY 8 HOURS PRN
Status: CANCELLED | OUTPATIENT
Start: 2025-04-13

## 2025-04-13 RX ORDER — TRAMADOL HYDROCHLORIDE 50 MG/1
50 TABLET ORAL EVERY 6 HOURS PRN
Status: CANCELLED | OUTPATIENT
Start: 2025-04-13

## 2025-04-13 RX ORDER — ASCORBIC ACID 500 MG
500 TABLET ORAL 2 TIMES DAILY
Status: CANCELLED | OUTPATIENT
Start: 2025-04-14

## 2025-04-13 RX ORDER — ONDANSETRON HYDROCHLORIDE 2 MG/ML
4 INJECTION, SOLUTION INTRAVENOUS EVERY 8 HOURS PRN
Status: CANCELLED | OUTPATIENT
Start: 2025-04-13

## 2025-04-13 RX ORDER — ASPIRIN 325 MG
325 TABLET, DELAYED RELEASE (ENTERIC COATED) ORAL 2 TIMES DAILY
Status: CANCELLED | OUTPATIENT
Start: 2025-04-14

## 2025-04-13 RX ORDER — POLYETHYLENE GLYCOL 3350 17 G/17G
17 POWDER, FOR SOLUTION ORAL DAILY
Status: CANCELLED | OUTPATIENT
Start: 2025-04-14

## 2025-04-13 RX ORDER — OXYCODONE HYDROCHLORIDE 5 MG/1
10 TABLET ORAL EVERY 4 HOURS PRN
Status: CANCELLED | OUTPATIENT
Start: 2025-04-13

## 2025-04-13 RX ORDER — DIPHENHYDRAMINE HCL 12.5MG/5ML
12.5 LIQUID (ML) ORAL EVERY 6 HOURS PRN
Status: CANCELLED | OUTPATIENT
Start: 2025-04-13

## 2025-04-13 RX ORDER — GABAPENTIN 300 MG/1
300 CAPSULE ORAL 3 TIMES DAILY PRN
Status: CANCELLED | OUTPATIENT
Start: 2025-04-13

## 2025-04-13 RX ORDER — SODIUM CHLORIDE, SODIUM LACTATE, POTASSIUM CHLORIDE, CALCIUM CHLORIDE 600; 310; 30; 20 MG/100ML; MG/100ML; MG/100ML; MG/100ML
100 INJECTION, SOLUTION INTRAVENOUS CONTINUOUS
Status: CANCELLED | OUTPATIENT
Start: 2025-04-14 | End: 2025-04-14

## 2025-04-13 RX ORDER — CARISOPRODOL 350 MG/1
350 TABLET ORAL 3 TIMES DAILY PRN
Status: CANCELLED | OUTPATIENT
Start: 2025-04-13

## 2025-04-13 RX ORDER — CEFAZOLIN SODIUM 2 G/100ML
2 INJECTION, SOLUTION INTRAVENOUS EVERY 8 HOURS
Status: CANCELLED | OUTPATIENT
Start: 2025-04-14 | End: 2025-04-14

## 2025-04-14 ENCOUNTER — ANESTHESIA EVENT (OUTPATIENT)
Dept: OPERATING ROOM | Facility: HOSPITAL | Age: 75
End: 2025-04-14
Payer: MEDICARE

## 2025-04-14 ENCOUNTER — ANESTHESIA (OUTPATIENT)
Dept: OPERATING ROOM | Facility: HOSPITAL | Age: 75
End: 2025-04-14
Payer: MEDICARE

## 2025-04-14 ENCOUNTER — HOSPITAL ENCOUNTER (OUTPATIENT)
Facility: HOSPITAL | Age: 75
Discharge: HOME | End: 2025-04-14
Attending: ORTHOPAEDIC SURGERY | Admitting: ORTHOPAEDIC SURGERY
Payer: MEDICARE

## 2025-04-14 VITALS
WEIGHT: 262.79 LBS | HEIGHT: 71 IN | DIASTOLIC BLOOD PRESSURE: 87 MMHG | HEART RATE: 63 BPM | RESPIRATION RATE: 16 BRPM | TEMPERATURE: 97.3 F | BODY MASS INDEX: 36.79 KG/M2 | OXYGEN SATURATION: 98 % | SYSTOLIC BLOOD PRESSURE: 150 MMHG

## 2025-04-14 PROBLEM — R06.09 DYSPNEA ON EXERTION: Status: ACTIVE | Noted: 2024-07-08

## 2025-04-14 PROBLEM — G89.4 CHRONIC PAIN SYNDROME: Status: ACTIVE | Noted: 2023-09-03

## 2025-04-14 PROCEDURE — 7100000011 HC EXTENDED STAY RECOVERY HOURLY - NURSING UNIT

## 2025-04-14 PROCEDURE — 2500000005 HC RX 250 GENERAL PHARMACY W/O HCPCS: Performed by: ORTHOPAEDIC SURGERY

## 2025-04-14 RX ORDER — ONDANSETRON HYDROCHLORIDE 2 MG/ML
4 INJECTION, SOLUTION INTRAVENOUS ONCE AS NEEDED
Status: CANCELLED | OUTPATIENT
Start: 2025-04-14

## 2025-04-14 RX ORDER — CEFAZOLIN SODIUM 2 G/100ML
2 INJECTION, SOLUTION INTRAVENOUS ONCE
Status: DISCONTINUED | OUTPATIENT
Start: 2025-04-14 | End: 2025-04-14 | Stop reason: HOSPADM

## 2025-04-14 RX ORDER — HYDROMORPHONE HYDROCHLORIDE 0.2 MG/ML
0.2 INJECTION INTRAMUSCULAR; INTRAVENOUS; SUBCUTANEOUS EVERY 5 MIN PRN
Status: CANCELLED | OUTPATIENT
Start: 2025-04-14

## 2025-04-14 RX ORDER — HYDRALAZINE HYDROCHLORIDE 20 MG/ML
5 INJECTION INTRAMUSCULAR; INTRAVENOUS EVERY 30 MIN PRN
Status: CANCELLED | OUTPATIENT
Start: 2025-04-14

## 2025-04-14 RX ORDER — SODIUM CHLORIDE, SODIUM LACTATE, POTASSIUM CHLORIDE, CALCIUM CHLORIDE 600; 310; 30; 20 MG/100ML; MG/100ML; MG/100ML; MG/100ML
100 INJECTION, SOLUTION INTRAVENOUS CONTINUOUS
Status: CANCELLED | OUTPATIENT
Start: 2025-04-14 | End: 2025-04-15

## 2025-04-14 RX ORDER — ALBUTEROL SULFATE 0.83 MG/ML
2.5 SOLUTION RESPIRATORY (INHALATION) ONCE AS NEEDED
Status: CANCELLED | OUTPATIENT
Start: 2025-04-14

## 2025-04-14 RX ORDER — FENTANYL CITRATE 50 UG/ML
50 INJECTION, SOLUTION INTRAMUSCULAR; INTRAVENOUS ONCE
Status: DISCONTINUED | OUTPATIENT
Start: 2025-04-14 | End: 2025-04-14 | Stop reason: HOSPADM

## 2025-04-14 RX ORDER — MIDAZOLAM HYDROCHLORIDE 1 MG/ML
2 INJECTION, SOLUTION INTRAMUSCULAR; INTRAVENOUS ONCE
Status: DISCONTINUED | OUTPATIENT
Start: 2025-04-14 | End: 2025-04-14 | Stop reason: HOSPADM

## 2025-04-14 RX ORDER — PROPOFOL 10 MG/ML
INJECTION, EMULSION INTRAVENOUS CONTINUOUS PRN
Status: DISCONTINUED | OUTPATIENT
Start: 2025-04-14 | End: 2025-04-15 | Stop reason: HOSPADM

## 2025-04-14 RX ORDER — LIDOCAINE HYDROCHLORIDE 10 MG/ML
0.1 INJECTION, SOLUTION EPIDURAL; INFILTRATION; INTRACAUDAL; PERINEURAL ONCE
Status: CANCELLED | OUTPATIENT
Start: 2025-04-14 | End: 2025-04-14

## 2025-04-14 RX ORDER — HYDROMORPHONE HYDROCHLORIDE 0.2 MG/ML
0.1 INJECTION INTRAMUSCULAR; INTRAVENOUS; SUBCUTANEOUS EVERY 5 MIN PRN
Status: CANCELLED | OUTPATIENT
Start: 2025-04-14

## 2025-04-14 RX ADMIN — POVIDONE-IODINE 1 APPLICATION: 5 SOLUTION TOPICAL at 11:24

## 2025-04-14 SDOH — HEALTH STABILITY: MENTAL HEALTH: CURRENT SMOKER: 1

## 2025-04-14 ASSESSMENT — PAIN SCALES - GENERAL: PAINLEVEL_OUTOF10: 0 - NO PAIN

## 2025-04-14 ASSESSMENT — PAIN - FUNCTIONAL ASSESSMENT: PAIN_FUNCTIONAL_ASSESSMENT: 0-10

## 2025-04-14 NOTE — ANESTHESIA PREPROCEDURE EVALUATION
Patient: Tito Garcia    Procedure Information       Date/Time: 04/14/25 1240    Procedure: ARTHROPLASTY, KNEE, TOTAL, USING ROBOT-ASSISTED NAVIGATION (JOANN ROBOTIC ASSISTED DEVICE, TRUDY TRIATHLON PS FEMUR, UNIVERSAL TIBIAL TRAY, X3 PS POLY & PATELLA) ** Overnight ** (Right: Knee)    Location: [facility] / Virtual [facility]    Surgeons: Darren Raphael DO          Past Medical History:   Diagnosis Date    Arthritis     Hyperlipidemia     Hypertension     Other bursal cyst, other site     Synovial cyst of lumbar spine      Relevant Problems   Cardiac   (+) Atrial fibrillation (Multi)   (+) Mixed hyperlipidemia   (+) Peripheral vascular disease (CMS-HCC)   (+) Primary hypertension      Pulmonary   (+) Dyspnea on exertion      Neuro   (+) Anxiety   (+) EMILY (generalized anxiety disorder)   (+) Major depressive disorder, recurrent severe without psychotic features (Multi)   (+) Peripheral neuropathy      /Renal   (+) Bilateral hydronephrosis   (+) Hyponatremia      Endocrine   (+) Obesity, morbid (Multi)      Musculoskeletal   (+) Chronic pain syndrome   (+) Degenerative joint disease of toe   (+) Lumbar degenerative disc disease   (+) Osteoarthritis of both hips   (+) Osteoarthritis of right knee   (+) Primary osteoarthritis of both knees   (+) Primary osteoarthritis of left knee      HEENT   (+) Chronic maxillary sinusitis     Past Surgical History:   Procedure Laterality Date    MR HEAD ANGIO WO IV CONTRAST  8/1/2021    MR HEAD ANGIO WO IV CONTRAST [facility]    OTHER SURGICAL HISTORY  11/10/2022    Carpal tunnel surgery    OTHER SURGICAL HISTORY  11/10/2022    Tonsillectomy    OTHER SURGICAL HISTORY  11/10/2022    Excision melanoma      Clinical information reviewed:   Tobacco  Allergies  Meds   Med Hx  Surg Hx   Fam Hx  Soc Hx        NPO Detail:  NPO/Void Status  Carbohydrate Drink Given Prior to Surgery? : N  Date of Last Liquid: 04/14/25  Time of Last Liquid: 0700  Date of Last Solid:  04/14/25  Time of Last Solid: 2230  Last Intake Type: Clear fluids  Time of Last Void: 0900         Physical Exam    Airway  Mallampati: III  TM distance: >3 FB  Neck ROM: full     Cardiovascular    Dental    Pulmonary    Abdominal            Anesthesia Plan    History of general anesthesia?: yes  History of complications of general anesthesia?: no    ASA 3     spinal     The patient is a current smoker.  Patient was previously instructed to abstain from smoking on day of procedure.  Patient smoked on day of procedure.    intravenous induction   Postoperative administration of opioids is intended.  Anesthetic plan and risks discussed with patient.    Plan discussed with attending.

## 2025-04-14 NOTE — DISCHARGE INSTRUCTIONS
[facility]                            Kalli Bryan MBA, BSN, RN-BC, ONC  KEVIN Templeton, RN  [facility]  Phone: [phone]    Darren Raphael D.O.  Phone: [phone]    POSTOPERATIVE INSTRUCTIONS: TOTAL HIP & TOTAL KNEE ARTHROPLASTY    General/highlights: Flex/tighten the muscles in the buttocks, thighs and calves often when in chair or bed.  Utilize the incentive spirometer often especially the next 3 days.  Walk at least 10 steps every hour that you are awake.  Take stairs 1 at a time, good leg leads up, bed leg legs down, use the handrails.  You may be weightbearing as tolerated, in general the walker is used for 2 weeks.    PAIN, SWELLING & BRUISING  Some pain, stiffness and swelling is normal for up to 1 year after surgery.  Pain will start to let up over time depending on your activity level.  It is preferable to rest for 24 hours following your surgery  Pain is often delayed for 24-48 hours after surgery.  Pain may be dull/achy, throbbing, or even sharp/nerve sensations  It is normal for swelling and bruising to worsen before it gets better.  These symptoms usually peak 1 week after surgery  Swelling and bruising may appear throughout the leg, all the way down to your toes  Wear your compression stockings every day during the day for 14 days and remove them at night.  This will help control swelling    WOUND CARE INSTRUCTIONS  Your surgical bandage will be removed 2 weeks after surgery at your post-operative visit.  If your bandage becomes compromised, begins to come off before then, or soaks through with drainage call the office immediately.  You may have sutures under the skin that dissolve on their own over time.    As the sutures absorb occasionally a small suture abscess can develop, this is not uncommon for up to 6 weeks, if this occurs please notify your surgeon immediately.    HYGIENE  You may shower 24 hours after your surgery, provided the  Mepilex silver dressing is in place.  No tub bathing or submerging underwater.  Do not scrub directly over the surgical bandage  Do not use any creams, lotions or ointments on the surgical leg for 4 weeks after surgery, or until you have been cleared to do so by your surgeon    GENERAL INSTRUCTIONS  Do not drink alcoholic beverages (beer and wine included) for 24 hours following your surgery, or while you are taking narcotic pain medications  Delay making important decisions until you are fully recovered  You cannot swim or submerge in water for at least 6 weeks after surgery, or until you are cleared by your surgeon.  You may start kneeling 3 months after knee replacement surgery, once you have been cleared by your surgeon.  This may not ever feel “normal” or comfortable    HOME DIET  Resume your normal diet after surgery. If you are on a specific type of diet for your condition, resume that instead.    Choose foods that help promote good bowel habits and prevent constipation, such as foods high in fiber.    POSTOPERATIVE MEDICATIONS  Pain medications have been ordered to help manage pain throughout recovery.  While you are using narcotic pain medication, you should be using a stool softener or laxative to prevent constipation.  My preference is parallax powder once or twice a day when taking the narcotic pain medicine  It is important to eat a small meal or snack before taking pain medications to avoid nausea or stomach upset.    MEDICATION REFILLS - [phone]  If you need to request a medication refill, please call the office between 8:30am-4:30pm, Monday through Friday.    Any calls received outside of this timeframe will be handled on the next business day.    Medication requests received on Saturday or Sunday will be handled on Monday.    Please allow 3-5 business days for all medication requests to be processed.    RESTARTING HOME MEDICATIONS  You may restart your home medications the following day after  "your surgery UNLESS you have been given alternate instructions.    Follow the instructions given to you on your hospital discharge instructions for more information regarding your home medications.    ASSISTIVE DEVICE & MOVEMENT  Initially, you will use a walker or crutches to walk for the first 2 weeks.  Once your therapist feels you are ready, you will wean to one crutch or cane followed by no assistive device.  It is important to keep moving throughout recovery.  Walk at least 10 steps every hour that you are awake.  Stairs are part of your recovery, the \"good \"leg leads on the way up, the \"bad \"leg leads on the way down to, use the handrails and not the walker or crutches.  You should be up and walking around several times per day as well as bending your knee and making sure your knee is going completely straight (for knee replacements).  For anterior hip replacements avoid straight leg raise.    NUMBNESS/CLICKING  Decreased sensation or numbness is common on or around the area of the incision due to sensory nerves that are affected at the time of surgery.  The area of numbness will be lateral to the incision  You might always have numbness but the size of the area of numbness should decrease with time.  It is common for patients to have a “click” in the knee with movement.  This is usually nothing to be concerned about.  There are several reasons why your knee can be making these noises, including the implant components rubbing against each other, or a tendons going over a bony prominence.  Grinding can also occur and is not out of the ordinary.  Grinding can be caused by scar tissue formation  Noises will often settle over time once muscle strength improves.    DIFFICULTY SLEEPING  It is very common for patients to have difficulty sleeping at night which can be caused pain, medication, or feeling of anxiety.  Sleep disturbance typically worsens 4-6 weeks after surgery.  You are permitted to sleep on your back " or side with a  pillow between your legs for comfort    DRIVING & TRAVEL  Your surgeon will address this at your post-op appointment.  You must not be taking narcotic pain medication to be cleared to drive.  LEFT LEG JOINT REPLACMENT:  You may drive once you have regained full control of your leg, typically around 2 week after surgery  RIGHT LEG JOINT REPLACEMENT:  You must speak with your surgeon before you resume driving.  Driving can typically be resumed by 4-6 weeks after surgery.  During long distance travel, you should attempt to change position or stand every hour.  You should complete ankle pumps throughout your travel if you are sitting for long periods of time.  If traveling within the first 2 weeks after surgery, you should wear your compression stockings.    DENTAL & OTHER PROCEDURES    All patients must wait a minimum of 3 months for elective procedures, including routine dental cleanings.  For any dental appointment - cleaning or dental procedures - patients must take a prophylactic antibiotic 1 hour before the appointment.  You will also need to call for an antibiotic prior to any other invasive test, procedure, or surgery.  These prophylactic antibiotics will be needed for the rest of your life, in order to prevent infections.  Please call your surgeons office at [phone] to request the antibiotic.      PHYSICAL THERAPY  Following surgery it is important to progress through recovery with in-home or outpatient physical therapy.  You should continue to complete home exercises provided from the hospital on days that you are not working with a physical therapist.  It is common to have a temporary increase in pain and swelling upon starting outpatient physical therapy and/or changing your exercise routine.  Continue to use ice to help with symptoms.     FOLLOW-UP APPOINTMENT - [phone]    Your post-surgical appointments will take place approximately 2 weeks, 6 weeks, 3 months and 1 year following  surgery.  Joint replacements are monitored thereafter every 2-5 years for life.  If you have any questions or need to make changes to this appointment, please contact the office:    EMERGENCIES & WHEN TO CALL YOUR SURGEON  When to contact our office immediately:  Any falls or injury to the joint replacement  Fever >101.5 for at least 48 hours after surgery or chills.  Excessive bleeding from incision(s). A small amount of drainage is normal and expected.  Signs of infection of incision(s)-excessive drainage that is soaking through your dressing (especially if it is pus-like), redness that is spreading out from the edges of your incision, or increased warmth around the area.  Excruciating pain for which the pain medication, taken as instructed, is not helping.  Severe calf pain.  Go directly to the emergency room or call 911, if you are experiencing chest pain or difficulty breathing.    ICE/COLD THERAPY  Ice is most important during the first 2 weeks after surgery, but should be used for several weeks as needed.  Never place ice, or cold therapy devices directly on the skin.  You should always have a protective layer between your skin and the cold.  You have been prescribed to ice your total joint at a minimum of twice per hour for 20 minutes while awake during the first 6 weeks after surgery if you are using ice packs. This will help with pain control.  If you are using an ice machine, please follow ice machine instructions.  After knee replacement is extremely important to elevate the leg straight on an incline, not flat.    COLD THERAPY MACHINE RECOMMENDATIONS  Cold therapy devices can be used before and after surgery to assist in comfort and help to reduce pain and swelling.  These devices differ from ice or ice packs, whereas the mechanism circulates water through tubing and a pad to provide longer periods of cold therapy to the desired site.  While in the hospital, you can use your cold devices around the  clock for optimal comfort.  We recommend using cold therapy after working with therapy or completing exercises on your own.  Once you are discharged home, there is no set schedule in which you must follow while using cold therapy.  Below are a few points to remember when using a cold therapy device:    Read the 's instructions prior to first the use.  Follow instructions for filling the cooler (water first, then ice).  Always make sure there is a layer of protection between the cold pad and your skin (Clothing, Towel, Ace Bandage, etc.)  Allow the device to circulate cold water throughout the pad prior wrapping the pad around your leg (approximately 10 minutes).  Place the pad on your leg in the desired position to meet your pain management needs and use the wraps provided to secure the pad to your body.  The purpose of this device is to use consistently throughout the day.  You do not need to need to use the 20 on, 20 off method when using an ice machine.  During waking hours, remove the cold pad every 1-2 hours to perform a skin check  Detach the pad from the cooler and ambulate at least once every hour  After removing the pad, allow at least 30 minutes before resuming cold therapy  You may wear the cold therapy device during periods of sleep including overnight    If you wake up during the night, you can check the skin at this time.  You do not need to wake up specifically to perform skin checks.  Empty the cooler and pad when device is not in use.  Follow 's instructions for cleaning your cold therapy device.    [facility] can assist with problems related to products purchased through the [facility]  [phone] - [facility]  or   [phone] - [place]      Medication Refills - [phone] - Monday through Friday 8:30am-4:30pm  Please allow 3-5 days for medication refill requests to be processed.

## 2025-04-14 NOTE — NURSING NOTE
Provider at bedside at this time assessing pt's RLE due to concerns with swelling. Pt to check back with PCP for water pills and then to reschedule surgery when swelling goes down. Wife and friends are at bedside and are educated of plan of care. Ice machine and pt's belongings returned to patient. Ivs removed.

## 2025-04-14 NOTE — CARE PLAN
Day of surgery patient was evaluated in preoperative holding.  Right lower extremity found to be rather swollen significantly warmer than the opposite leg associated erythema some early venous stasis lesions were identified in the skin over the anterior lateral and medial shin.  2+ pitting edema to the level of the knee identified.  Left lower extremity also demonstrating 2+ pitting edema to the level of the knee but without the skin color and temperature changes and no early venous stasis skin breakdown appreciated.  Given the asymmetry and tense nature of his skin was discussed with the patient and his family that we felt it safer to cancel his total knee.  Will have him follow-up with his primary care provider likely require diuresis and then we can reschedule his total knee when the fluid stasis and his limbs are better controlled.  They stated their understanding.

## 2025-04-22 ENCOUNTER — OFFICE VISIT (OUTPATIENT)
Dept: PRIMARY CARE | Facility: CLINIC | Age: 75
End: 2025-04-22
Payer: MEDICARE

## 2025-04-22 VITALS
WEIGHT: 266 LBS | SYSTOLIC BLOOD PRESSURE: 122 MMHG | HEART RATE: 88 BPM | OXYGEN SATURATION: 98 % | HEIGHT: 70 IN | BODY MASS INDEX: 38.08 KG/M2 | DIASTOLIC BLOOD PRESSURE: 72 MMHG

## 2025-04-22 DIAGNOSIS — I10 PRIMARY HYPERTENSION: Primary | ICD-10-CM

## 2025-04-22 DIAGNOSIS — R60.0 BILATERAL LEG EDEMA: ICD-10-CM

## 2025-04-22 PROBLEM — C43.9 MALIGNANT MELANOMA OF SKIN: Status: ACTIVE | Noted: 2021-08-06

## 2025-04-22 PROBLEM — G56.00 CARPAL TUNNEL SYNDROME: Status: ACTIVE | Noted: 2025-04-22

## 2025-04-22 PROBLEM — R41.3 MEMORY LOSS: Status: ACTIVE | Noted: 2025-04-22

## 2025-04-22 PROBLEM — R27.0 ATAXIA: Status: ACTIVE | Noted: 2025-04-22

## 2025-04-22 PROCEDURE — 99213 OFFICE O/P EST LOW 20 MIN: CPT | Performed by: FAMILY MEDICINE

## 2025-04-22 PROCEDURE — 99214 OFFICE O/P EST MOD 30 MIN: CPT | Performed by: FAMILY MEDICINE

## 2025-04-22 PROCEDURE — 3074F SYST BP LT 130 MM HG: CPT | Performed by: FAMILY MEDICINE

## 2025-04-22 PROCEDURE — 1036F TOBACCO NON-USER: CPT | Performed by: FAMILY MEDICINE

## 2025-04-22 PROCEDURE — 3078F DIAST BP <80 MM HG: CPT | Performed by: FAMILY MEDICINE

## 2025-04-22 PROCEDURE — 3008F BODY MASS INDEX DOCD: CPT | Performed by: FAMILY MEDICINE

## 2025-04-22 PROCEDURE — 1125F AMNT PAIN NOTED PAIN PRSNT: CPT | Performed by: FAMILY MEDICINE

## 2025-04-22 PROCEDURE — 1159F MED LIST DOCD IN RCRD: CPT | Performed by: FAMILY MEDICINE

## 2025-04-22 PROCEDURE — G2211 COMPLEX E/M VISIT ADD ON: HCPCS | Performed by: FAMILY MEDICINE

## 2025-04-22 PROCEDURE — 1124F ACP DISCUSS-NO DSCNMKR DOCD: CPT | Performed by: FAMILY MEDICINE

## 2025-04-22 RX ORDER — CHLORTHALIDONE 25 MG/1
25 TABLET ORAL DAILY
Qty: 90 TABLET | Refills: 0 | Status: SHIPPED | OUTPATIENT
Start: 2025-04-22 | End: 2025-07-21

## 2025-04-22 RX ORDER — METOPROLOL SUCCINATE 50 MG/1
50 TABLET, EXTENDED RELEASE ORAL DAILY
Qty: 90 TABLET | Refills: 0 | Status: SHIPPED | OUTPATIENT
Start: 2025-04-22

## 2025-04-22 RX ORDER — LOSARTAN POTASSIUM 100 MG/1
100 TABLET ORAL DAILY
Qty: 90 TABLET | Refills: 0 | Status: SHIPPED | OUTPATIENT
Start: 2025-04-22 | End: 2025-07-21

## 2025-04-22 RX ORDER — FUROSEMIDE 40 MG/1
40 TABLET ORAL DAILY
COMMUNITY
End: 2025-04-22 | Stop reason: ALTCHOICE

## 2025-04-22 ASSESSMENT — ENCOUNTER SYMPTOMS
LOSS OF SENSATION IN FEET: 0
DEPRESSION: 0
OCCASIONAL FEELINGS OF UNSTEADINESS: 0

## 2025-04-22 ASSESSMENT — PATIENT HEALTH QUESTIONNAIRE - PHQ9
SUM OF ALL RESPONSES TO PHQ9 QUESTIONS 1 AND 2: 0
1. LITTLE INTEREST OR PLEASURE IN DOING THINGS: NOT AT ALL
2. FEELING DOWN, DEPRESSED OR HOPELESS: NOT AT ALL

## 2025-04-22 ASSESSMENT — PAIN SCALES - GENERAL: PAINLEVEL_OUTOF10: 6

## 2025-04-22 NOTE — PROGRESS NOTES
74-year-old presents to clinic for follow-up    Hypertension:  Previously very well-controlled on his medication therapy with the recent addition of amlodipine 10 mg around 6 months ago.  Patient was just disqualified from a knee replacement secondary to significant lower extremity edema and the physician prescribed him Lasix to compensate for this.  Has never attempted coming off amlodipine.  Blood pressure today is 122/72    All pertinent positive symptoms are included in history of present illness.    All other systems have been reviewed and are negative and noncontributory to this patient's current ailments.      CONSTITUTIONAL - INAD. Not ill appearing.  SKIN - No lesions or rashes visualized. No jaundice visualized.  HEENT- Atraumatic, normocephalic, no scleral icterus, external nares are not erythematous and without drainage, no neck masses visualized, oropharynx visualized and is without erythema or exudate  RESP - respiration not labored   CARDIAC - no grade 6 systolic murmurs auscultated  ABDOMEN - nondistended.  NEURO- CNs II-XII grossly intact  EXTREMITIES-+2 pitting edema bilaterally      1. Primary hypertension (Primary)  All patient's blood pressure is well-controlled on current therapy, he has significant lower extremity edema and amlodipine may be contributing to this.  Will trial off the amlodipine and begin chlorthalidone in place.  Continue losartan and metoprolol.  Follow-up in 1 month to recheck.  If no change in lower extremity edema, or blood pressure still not well-controlled, could consider resuming amlodipine and beginning typical therapy including Lasix as well as compression stockings and other conservative management  - chlorthalidone (Hygroton) 25 mg tablet; Take 1 tablet (25 mg) by mouth once daily.  Dispense: 90 tablet; Refill: 0  - losartan (Cozaar) 100 mg tablet; Take 1 tablet (100 mg) by mouth once daily.  Dispense: 90 tablet; Refill: 0  - metoprolol succinate XL (Toprol-XL) 50 mg  24 hr tablet; Take 1 tablet (50 mg) by mouth once daily.  Dispense: 90 tablet; Refill: 0    2. Bilateral leg edema  Possibly secondary to amlodipine, stop amlodipine trial chlorthalidone  - chlorthalidone (Hygroton) 25 mg tablet; Take 1 tablet (25 mg) by mouth once daily.  Dispense: 90 tablet; Refill: 0

## 2025-05-12 ENCOUNTER — APPOINTMENT (OUTPATIENT)
Dept: CARDIOLOGY | Facility: CLINIC | Age: 75
End: 2025-05-12
Payer: MEDICARE

## 2025-05-12 VITALS
SYSTOLIC BLOOD PRESSURE: 125 MMHG | OXYGEN SATURATION: 97 % | BODY MASS INDEX: 38.02 KG/M2 | WEIGHT: 265.6 LBS | HEART RATE: 62 BPM | HEIGHT: 70 IN | DIASTOLIC BLOOD PRESSURE: 75 MMHG

## 2025-05-12 DIAGNOSIS — I10 PRIMARY HYPERTENSION: ICD-10-CM

## 2025-05-12 DIAGNOSIS — R60.0 BILATERAL LEG EDEMA: ICD-10-CM

## 2025-05-12 DIAGNOSIS — I10 PRIMARY HYPERTENSION: Primary | ICD-10-CM

## 2025-05-12 LAB
ATRIAL RATE: 62 BPM
P AXIS: 34 DEGREES
P OFFSET: 180 MS
P ONSET: 115 MS
PR INTERVAL: 218 MS
Q ONSET: 224 MS
QRS COUNT: 11 BEATS
QRS DURATION: 100 MS
QT INTERVAL: 396 MS
QTC CALCULATION(BAZETT): 401 MS
QTC FREDERICIA: 400 MS
R AXIS: 28 DEGREES
T AXIS: 42 DEGREES
T OFFSET: 422 MS
VENTRICULAR RATE: 62 BPM

## 2025-05-12 PROCEDURE — 3008F BODY MASS INDEX DOCD: CPT | Performed by: INTERNAL MEDICINE

## 2025-05-12 PROCEDURE — 1160F RVW MEDS BY RX/DR IN RCRD: CPT | Performed by: INTERNAL MEDICINE

## 2025-05-12 PROCEDURE — 3078F DIAST BP <80 MM HG: CPT | Performed by: INTERNAL MEDICINE

## 2025-05-12 PROCEDURE — 1036F TOBACCO NON-USER: CPT | Performed by: INTERNAL MEDICINE

## 2025-05-12 PROCEDURE — 1126F AMNT PAIN NOTED NONE PRSNT: CPT | Performed by: INTERNAL MEDICINE

## 2025-05-12 PROCEDURE — 99214 OFFICE O/P EST MOD 30 MIN: CPT | Performed by: INTERNAL MEDICINE

## 2025-05-12 PROCEDURE — 1159F MED LIST DOCD IN RCRD: CPT | Performed by: INTERNAL MEDICINE

## 2025-05-12 PROCEDURE — 93010 ELECTROCARDIOGRAM REPORT: CPT | Performed by: INTERNAL MEDICINE

## 2025-05-12 PROCEDURE — 3074F SYST BP LT 130 MM HG: CPT | Performed by: INTERNAL MEDICINE

## 2025-05-12 PROCEDURE — 99214 OFFICE O/P EST MOD 30 MIN: CPT | Mod: 25 | Performed by: INTERNAL MEDICINE

## 2025-05-12 PROCEDURE — 93005 ELECTROCARDIOGRAM TRACING: CPT | Performed by: INTERNAL MEDICINE

## 2025-05-12 ASSESSMENT — ENCOUNTER SYMPTOMS
LOSS OF SENSATION IN FEET: 0
OCCASIONAL FEELINGS OF UNSTEADINESS: 0
DEPRESSION: 0

## 2025-05-12 ASSESSMENT — PAIN SCALES - GENERAL: PAINLEVEL_OUTOF10: 0-NO PAIN

## 2025-05-12 NOTE — PROGRESS NOTES
Subjective:  Patient returns for a routine 6-month follow-up.  He unfortunately had to have his knee surgery postponed apparently several times.  The most recent cancellation was due to some peripheral edema.    He did have his amlodipine stopped, and he was initiated on chlorthalidone.  He is not sure that the swelling is much better, but he remains off the amlodipine and has been taking his chlorthalidone compliantly.  The swelling certainly does get worse during the day and is fairly minimal in the morning.  He does not have any prior history of DVTs that he is aware of.  His echocardiogram showed no evidence of any right-sided heart problems.  He has been compliant with his Eliquis for his PAF.    He has been staying reasonably active and denies any chest discomfort or dyspnea.    Objective:  General: Alert, usual self.  HEENT: Unchanged.  Lungs: Clear without crackles.  Cardiac: Normal S1 and S2.  Abdomen: Nontender.  Extremities: 1-2+ edema.  Neuro: Grossly intact.  Skin: No acute rash.    EKG: Normal sinus rhythm with first-degree AV block.    Impression/plan:  Patient generally remains clinically stable from a cardiovascular standpoint.  He is not having any anginal type symptoms despite his markedly elevated coronary calcium score.  I will thus hold on any ischemic evaluation at this time.    He is having some peripheral edema of unclear etiology.  I suspect it may be due to some chronic venous insufficiency.  I will have him remain off the amlodipine, and we will have him take his chlorthalidone compliantly as he has been doing.  I will see him back in 6 weeks and see if we are making any progress.  I did encourage him to reduce his salt intake and to use compression stockings as much as possible.    His blood pressure remains under good control at this time on his current medical regimen so we will continue this unchanged.    I will have him get his repeat lipid panel checked in the near future to see  where we stand on low-dose atorvastatin.  I will make further recommendations at his next follow-up in 6 weeks to see if we need to escalate his lipid-lowering regimen given his markedly elevated coronary calcium score.    Patient instructions:    Continue current medications unchanged.    Obtaining repeat fasting lipid panel before your next visit.    Return to clinic in 6 weeks.

## 2025-05-13 RX ORDER — CHLORTHALIDONE 25 MG/1
25 TABLET ORAL DAILY
Qty: 90 TABLET | Refills: 0 | Status: SHIPPED | OUTPATIENT
Start: 2025-05-13 | End: 2025-08-11

## 2025-05-13 NOTE — TELEPHONE ENCOUNTER
Patient was told to follow-up in 1 month.  Please confirm he has an upcoming appointment.  If not no further refills can be provided until appointment.  Please have patient contact me with home blood pressure readings.

## 2025-05-14 ENCOUNTER — TELEPHONE (OUTPATIENT)
Dept: PRIMARY CARE | Facility: CLINIC | Age: 75
End: 2025-05-14
Payer: MEDICARE

## 2025-05-14 NOTE — TELEPHONE ENCOUNTER
Patient states he was advised to wear compression stockings but he is not sure what strength he needs.  Please advise.  Thank you.

## 2025-06-11 ENCOUNTER — OFFICE VISIT (OUTPATIENT)
Dept: PRIMARY CARE | Facility: CLINIC | Age: 75
End: 2025-06-11
Payer: MEDICARE

## 2025-06-11 VITALS
SYSTOLIC BLOOD PRESSURE: 132 MMHG | DIASTOLIC BLOOD PRESSURE: 76 MMHG | HEART RATE: 68 BPM | HEIGHT: 70 IN | OXYGEN SATURATION: 98 % | BODY MASS INDEX: 38.37 KG/M2 | WEIGHT: 268 LBS

## 2025-06-11 DIAGNOSIS — I73.9 PERIPHERAL VASCULAR DISEASE, UNSPECIFIED: ICD-10-CM

## 2025-06-11 DIAGNOSIS — M17.0 OSTEOARTHRITIS OF BOTH KNEES, UNSPECIFIED OSTEOARTHRITIS TYPE: ICD-10-CM

## 2025-06-11 DIAGNOSIS — I10 PRIMARY HYPERTENSION: ICD-10-CM

## 2025-06-11 DIAGNOSIS — I87.2 VENOUS INSUFFICIENCY: ICD-10-CM

## 2025-06-11 DIAGNOSIS — R60.0 BILATERAL LEG EDEMA: Primary | ICD-10-CM

## 2025-06-11 PROCEDURE — 99213 OFFICE O/P EST LOW 20 MIN: CPT | Performed by: FAMILY MEDICINE

## 2025-06-11 PROCEDURE — 1158F ADVNC CARE PLAN TLK DOCD: CPT | Performed by: FAMILY MEDICINE

## 2025-06-11 PROCEDURE — G2211 COMPLEX E/M VISIT ADD ON: HCPCS | Performed by: FAMILY MEDICINE

## 2025-06-11 PROCEDURE — 3008F BODY MASS INDEX DOCD: CPT | Performed by: FAMILY MEDICINE

## 2025-06-11 PROCEDURE — 1159F MED LIST DOCD IN RCRD: CPT | Performed by: FAMILY MEDICINE

## 2025-06-11 PROCEDURE — 3078F DIAST BP <80 MM HG: CPT | Performed by: FAMILY MEDICINE

## 2025-06-11 PROCEDURE — 1125F AMNT PAIN NOTED PAIN PRSNT: CPT | Performed by: FAMILY MEDICINE

## 2025-06-11 PROCEDURE — 1036F TOBACCO NON-USER: CPT | Performed by: FAMILY MEDICINE

## 2025-06-11 PROCEDURE — 3075F SYST BP GE 130 - 139MM HG: CPT | Performed by: FAMILY MEDICINE

## 2025-06-11 RX ORDER — METOPROLOL SUCCINATE 50 MG/1
50 TABLET, EXTENDED RELEASE ORAL DAILY
Qty: 90 TABLET | Refills: 3 | Status: SHIPPED | OUTPATIENT
Start: 2025-06-11 | End: 2026-06-11

## 2025-06-11 RX ORDER — LOSARTAN POTASSIUM 100 MG/1
100 TABLET ORAL DAILY
Qty: 90 TABLET | Refills: 3 | Status: SHIPPED | OUTPATIENT
Start: 2025-06-11 | End: 2026-06-11

## 2025-06-11 RX ORDER — ATORVASTATIN CALCIUM 10 MG/1
10 TABLET, FILM COATED ORAL DAILY
Qty: 90 TABLET | Refills: 3 | Status: SHIPPED | OUTPATIENT
Start: 2025-06-11

## 2025-06-11 RX ORDER — CHLORTHALIDONE 25 MG/1
25 TABLET ORAL DAILY
Qty: 90 TABLET | Refills: 3 | Status: SHIPPED | OUTPATIENT
Start: 2025-06-11 | End: 2026-06-11

## 2025-06-11 ASSESSMENT — ENCOUNTER SYMPTOMS
OCCASIONAL FEELINGS OF UNSTEADINESS: 0
DEPRESSION: 0
LOSS OF SENSATION IN FEET: 0

## 2025-06-11 ASSESSMENT — COLUMBIA-SUICIDE SEVERITY RATING SCALE - C-SSRS
1. IN THE PAST MONTH, HAVE YOU WISHED YOU WERE DEAD OR WISHED YOU COULD GO TO SLEEP AND NOT WAKE UP?: NO
2. HAVE YOU ACTUALLY HAD ANY THOUGHTS OF KILLING YOURSELF?: NO
6. HAVE YOU EVER DONE ANYTHING, STARTED TO DO ANYTHING, OR PREPARED TO DO ANYTHING TO END YOUR LIFE?: NO

## 2025-06-11 ASSESSMENT — PAIN SCALES - GENERAL: PAINLEVEL_OUTOF10: 6

## 2025-06-11 NOTE — PROGRESS NOTES
74-year presents for 1 month follow      Lower extremity edema:  Patient discontinued amlodipine started chlorthalidone, edema has improved however still experiencing mild.  Finds it difficult or troublesome to get Wei compression stockings OAARS been unable to wear them    Hypertension: Blood pressure 132/76 on new therapy including chlorthalidone after stopping amlodipine.      All pertinent positive symptoms are included in history of present illness.    All other systems have been reviewed and are negative and noncontributory to this patient's current ailments.      CONSTITUTIONAL - INAD. Not ill appearing.  SKIN - No lesions or rashes visualized. No jaundice visualized.  HEENT- Atraumatic, normocephalic, no scleral icterus, external nares are not erythematous and without drainage, no neck masses visualized, oropharynx visualized and is without erythema or exudate  RESP - respiration not labored   CARDIAC - no grade 6 systolic murmurs auscultated  ABDOMEN - nondistended.  NEURO- CNs II-XII grossly intact  Extremities: +1 pitting edema in the right lower extremity      1. Bilateral leg edema (Primary)  Improved with discontinuation of amlodipine recommend follow-up with vascular to discuss alternative therapies  - Referral to Physical Therapy; Future  - Referral to Vascular Medicine; Future  - chlorthalidone (Hygroton) 25 mg tablet; Take 1 tablet (25 mg) by mouth once daily.  Dispense: 90 tablet; Refill: 3    2. Primary hypertension  Well-controlled continue medications  - chlorthalidone (Hygroton) 25 mg tablet; Take 1 tablet (25 mg) by mouth once daily.  Dispense: 90 tablet; Refill: 3  - losartan (Cozaar) 100 mg tablet; Take 1 tablet (100 mg) by mouth once daily.  Dispense: 90 tablet; Refill: 3  - metoprolol succinate XL (Toprol-XL) 50 mg 24 hr tablet; Take 1 tablet (50 mg) by mouth once daily.  Dispense: 90 tablet; Refill: 3    3. Osteoarthritis of both knees, unspecified osteoarthritis type    - Referral to  Physical Therapy; Future    4. Venous insufficiency    - Referral to Vascular Medicine; Future    5. Peripheral vascular disease, unspecified    - atorvastatin (Lipitor) 10 mg tablet; Take 1 tablet (10 mg) by mouth once daily.  Dispense: 90 tablet; Refill: 3

## 2025-06-23 ENCOUNTER — OFFICE VISIT (OUTPATIENT)
Dept: CARDIOLOGY | Facility: CLINIC | Age: 75
End: 2025-06-23
Payer: MEDICARE

## 2025-06-23 VITALS
HEIGHT: 71 IN | SYSTOLIC BLOOD PRESSURE: 135 MMHG | OXYGEN SATURATION: 98 % | WEIGHT: 276.6 LBS | BODY MASS INDEX: 38.72 KG/M2 | DIASTOLIC BLOOD PRESSURE: 80 MMHG | HEART RATE: 73 BPM

## 2025-06-23 DIAGNOSIS — E78.2 MIXED HYPERLIPIDEMIA: ICD-10-CM

## 2025-06-23 DIAGNOSIS — I48.91 ATRIAL FIBRILLATION, UNSPECIFIED TYPE (MULTI): Primary | ICD-10-CM

## 2025-06-23 PROCEDURE — 3078F DIAST BP <80 MM HG: CPT | Performed by: INTERNAL MEDICINE

## 2025-06-23 PROCEDURE — 1159F MED LIST DOCD IN RCRD: CPT | Performed by: INTERNAL MEDICINE

## 2025-06-23 PROCEDURE — 1036F TOBACCO NON-USER: CPT | Performed by: INTERNAL MEDICINE

## 2025-06-23 PROCEDURE — 99212 OFFICE O/P EST SF 10 MIN: CPT

## 2025-06-23 PROCEDURE — 1160F RVW MEDS BY RX/DR IN RCRD: CPT | Performed by: INTERNAL MEDICINE

## 2025-06-23 PROCEDURE — 93005 ELECTROCARDIOGRAM TRACING: CPT | Performed by: INTERNAL MEDICINE

## 2025-06-23 PROCEDURE — 3008F BODY MASS INDEX DOCD: CPT | Performed by: INTERNAL MEDICINE

## 2025-06-23 PROCEDURE — 1125F AMNT PAIN NOTED PAIN PRSNT: CPT | Performed by: INTERNAL MEDICINE

## 2025-06-23 PROCEDURE — 99214 OFFICE O/P EST MOD 30 MIN: CPT | Performed by: INTERNAL MEDICINE

## 2025-06-23 PROCEDURE — 3075F SYST BP GE 130 - 139MM HG: CPT | Performed by: INTERNAL MEDICINE

## 2025-06-23 RX ORDER — ATORVASTATIN CALCIUM 20 MG/1
20 TABLET, FILM COATED ORAL DAILY
Qty: 90 TABLET | Refills: 3 | Status: SHIPPED | OUTPATIENT
Start: 2025-06-23 | End: 2026-06-23

## 2025-06-23 ASSESSMENT — PATIENT HEALTH QUESTIONNAIRE - PHQ9
2. FEELING DOWN, DEPRESSED OR HOPELESS: NOT AT ALL
SUM OF ALL RESPONSES TO PHQ9 QUESTIONS 1 AND 2: 0
1. LITTLE INTEREST OR PLEASURE IN DOING THINGS: NOT AT ALL

## 2025-06-23 ASSESSMENT — ENCOUNTER SYMPTOMS
OCCASIONAL FEELINGS OF UNSTEADINESS: 0
LOSS OF SENSATION IN FEET: 1
DEPRESSION: 0

## 2025-06-23 ASSESSMENT — PAIN SCALES - GENERAL: PAINLEVEL_OUTOF10: 4

## 2025-06-23 NOTE — PROGRESS NOTES
Subjective:  Tito returns for a routine 6-week follow-up.  When we last saw him, he was struggling a bit with some peripheral edema.  We continued to hold his amlodipine and told him to take his chlorthalidone more compliantly.  He feels the edema may be slightly better at this time.  It still becomes problematic at the end of the day when he is sedentary and sitting a good deal.    He denies any chest discomfort or dyspnea at his current activity level.  He denies any palpitations to suggest recurrent atrial fibrillation.  He remains compliant with his low-dose  atorvastatin for his hyperlipidemia and elevated coronary calcium score.  He also remains compliant with his Eliquis without any bleeding problems for his PAF.  He is still anxiously awaiting knee surgery this October.  Of note, this has been pushed back due to his peripheral edema.    Objective:  General: Alert, usual pleasant self.  HEENT: Unchanged.  Lungs: Clear without crackles.  Cardiac: Distant heart tones without change.  Abdomen: Nontender.  Extremities: 1-2+ edema bilaterally.  Skin: No rash.  Neuro: Grossly intact.    EKG: Normal sinus rhythm.  Within normal limits.    Impression/plan:  Patient is generally doing reasonably well at this time.  He is not having any anginal type symptoms despite his elevated coronary artery calcium score.  Given this, I did not think we needed to embark on a repeat ischemic workup at this time.    He is maintaining normal sinus rhythm at this time, and his heart rate and blood pressure remain under good control.  We will continue his losartan, metoprolol and chlorthalidone unchanged.  We will also maintain him on anticoagulation for his PAF.    He knows to watch his salt intake and to cut back his fluid intake to help with his peripheral edema.  I do suspect we are dealing with chronic venous insufficiency and no compelling right-sided heart disease.  He knows to alert me for any worsening swelling in which case we  could consider intermittent furosemide dosing.    I did elect to increase his atorvastatin to 20 mg daily.  I will see him back in 3 months, and we will reassess his lipid panel at that time.  Should we not have his LDL to goal, I will sort out if we should initiate ezetimibe therapy.    Patient instructions:    Increase atorvastatin to 20 mg daily and continue other medications unchanged.    Decrease your salt and fluid intake.    Return to clinic in 3 months.    Obtain repeat fasting lipid panel just before your next visit.

## 2025-06-24 LAB
ATRIAL RATE: 73 BPM
P AXIS: 5 DEGREES
P OFFSET: 177 MS
P ONSET: 119 MS
PR INTERVAL: 206 MS
Q ONSET: 222 MS
QRS COUNT: 12 BEATS
QRS DURATION: 96 MS
QT INTERVAL: 414 MS
QTC CALCULATION(BAZETT): 456 MS
QTC FREDERICIA: 442 MS
R AXIS: -2 DEGREES
T AXIS: 26 DEGREES
T OFFSET: 429 MS
VENTRICULAR RATE: 73 BPM

## 2025-06-25 NOTE — PROGRESS NOTES
Verbal consent of the patient and/or verbal parental consent for patients under the age of 18 have been obtained to conduct a physical examination at this office visit.    The  Srinivasa ALVARADO was present in the room during the entire visit including, but not limited to the physical examination.    Established patient    History Of Present Illness  06/30/25 Tito Garcia is a 74 y.o. male who presents for an evaluation of their BILATERAL legs. States that his primary complaint are his knees. He has history of severe tricompartmental arthritis. He was scheduled to have both knees replaced a few years ago by Dr. Raphael however due to heart issues and a DVT at the time he has not been able to do so. States that the left knee is typically worse than the right. 7/10 pain today. States that his pain wraps around but is primarily along the medial joint lines. He has had cortisone injections in his knees in the past and is unsure if he has had viscosupplementation injections. He continues to do cardio exercises for 30 minutes daily by walking and stationary bike and elliptical. He is expecting to have his right knee replaced in October.  He says additionally it has been tough because he has been taking care of his wife who has severe Alzheimer's/dementia that is progressed significantly over the past 2 years enough so that she is not able to take care of herself at times that he has to do so.  He is hoping he can get into some aquatic and physical therapy to get his strength out so he could continue to function to take care of his wife as well as himself.  He also wants to try to continue to lose some weight so if he is able to get his knee replaced he will have better results.  He also says he needs updated imaging for the surgery and also wondering if there is anything he can do for his other knee that he is not getting surgery on to get by.    All previous Progress Notes and imaging results related to this patients  chief complaint have been reviewed in preparation for this examination.    Past Medical History  He has a past medical history of A-fib (Multi), Arrhythmia, Arthritis, Hyperlipidemia, Hypertension, and Other bursal cyst, other site.    He has no past medical history of Cataract.    Surgical History  He has a past surgical history that includes Other surgical history (11/10/2022); Other surgical history (11/10/2022); Other surgical history (11/10/2022); and MR angio head wo IV contrast (8/1/2021).     Social History  He reports that he has quit smoking. His smoking use included cigarettes. He has a 15 pack-year smoking history. He has been exposed to tobacco smoke. He has never used smokeless tobacco. He reports that he does not currently use alcohol. He reports that he does not use drugs.    Family History  Family History[1]     Allergies  Patient has no known allergies.    Historical Clinical Intake    Review of Systems  CONSTITUTIONAL:   Negative for weight change, loss of appetite, fatigue, weakness, fever, chills, night sweats, headaches .           HEENT:   Negative for cold, cough, sore throat, sinus pain, swollen lymph nodes.           OPHTHALMOLOGY:   Negative for diminished vision, blurred vision, loss of vision, double vision.           ALLERGY:   Negative for runny nose, scratchy throat, sinus congestion, rash, facial pressure, nasal congestion, post-nasal drip.           CARDIOLOGY:   Negative for chest pain, palpitations, murmurs, irregular heart beat, shortness of breath, leg edema, dyspnea on exertion, fatigue, dizziness.           RESPIRATORY:   Negative for chest pain, shortness of breath, swelling of the legs, asthma/copd, chest congestion, pain with breathing .           GASTROENTEROLOGY:   Negative for nausea, vomitting, heartburn, constipation, diarrhea, blood in stool, change in bowel habits, black stool.           HEMATOLOGY/LYMPH:   Negative for fatigue, loss of appetitie, easy bruising, easy  bleeding, anemia, abnormal bleeding, slow healing.           ENDOCRINOLOGY:   Negative for polyuria, polydipsia, polyphagia, fatigue, weight loss, weight gain, cold intolerance, heat intolerance, diabetes.           MUSCULOSKELETAL:   Positive  for BILATERAL knee pain        DERMATOLOGY:   Negative for rash, bruising.           NEUROLOGY:   Negative for tingling, numbness, gait abnormality, paresthesias, weakness, sciatica.        Examination:  BILATERAL Medial Joint Line over the Medial Meniscus  Edema: Positive  Diffusely.   Effusion: Negative.   Percussion Test:  Negative.   Tuning Fork Test:  Negative.   Ecchymosis/Bruising: Negative.            Palpation:   Positive  BILATERAL  Medial Joint Line over the Medial Meniscus    Orientation: Positive Asymmetrical: because of the swelling.     Range of Motion:    Positive Knee Flexion (RIGHT 88 degrees, LEFT 90 degrees) Decreased because of pain and swelling  Positive Knee Extension (RIGHT 164 degrees, LEFT 160 degrees) Decreased because of pain and swelling           Muscle Strength:    Positive +4/+5 Quadricep Extension Causes pain  Positive +4/+5 Hamstring Flexion Causes pain  +5+5 Gastrocnemius  +5+5 Soleus.            Proprioception:   Pain with Squat: Positive    Pain with Sumo Squat:  Positive   Hop Test: Positive    Single leg balance test Positive            Vascular:   +2/+4 Femoral  +2/+4 Dorsalis Pedis  +2/+4 Posterior Tibial  Capillary Refill less than 2 seconds.                Knee - ACL:  Anterior Drawer Test: Negative   Lachman Test: Negative    Lelli Test: Negative                    KNEE - PCL/POSTERIOR LATERAL CORNER:  Posterior Drawer Test: Negative  Reverse Lachman Test: Negative     KNEE - POPLITEUS:  Cyrus's Test: Positive     KNEE - MCL / LCL:  Valgus Stress Test: 90 degrees: [id number] degrees:Negative   Varus Stress Test:  90 degrees: Negative, 20-30 degrees: Negative.   Apley Distraction Test: Negative  Thessaly Test: Unable to  perform       KNEE - MENISCUS:  Apley Compression Test:  Positive       Stienmann Test:  Positive   Shasha Test:  Positive     Bounce Home Test:  Positive  Thessaly Test: Unable to perform  KNEE - PATELLA:  Apprehension Test:  Positive  Glide Test:  Positive  Grind Test: Positive  Patella Tracking Test: Positive  Fat pad Impingement: Positive             KNEE - QUADRICEPS:         VMO Test: Positive   VLO Test:  Negative    Hip/Pelvis - Sacrum:  Leg Length Supine: Positive    Leg Length Supine to Seated (Derbolowsky Sign): Positive   Standing Flexion Test: Positive    Seated Flexion Test: Positive    Spring Test:    Sacral Somatic Dysfunction: Positive   Hip Flexor Tightness: Positive   Hamstring Tightness: Positive           Feet/Foot: Positive BILATERAL Valgus foot         Imaging and Diagnostics Review:   MR Left knee 10/04/2021   Narrative & Impression   PROCEDURE:         KNEE LT WO - WMR  2204  REASON FOR EXAM: M25.562 PAIN IN LEFT KNEE, R93.89 ABNORMAL XRAY     FINDINGS:     Anterior cruciate ligament is intact.     Posterior cruciate ligament is intact. No edema in the fat of the procedure  recess.     Lateral meniscus is unremarkable.     Medial meniscus demonstrates blunted diminutive body medial meniscus which  demonstrates component peripheral extrusion. The posterior horn demonstrates  irregular appearing tear with component of displaced meniscal flap extending  centrally into the joint space with flap arising from the posterior horn  about the root ligament insertion. The root ligament insertion is diminutive  and not well seen.     Medial collateral ligament complex is intact.     Lateral supporting structures are intact. There is moderate popliteus  tendinosis. No edema in the posterolateral corner.     Extensor mechanism is intact. Patellofemoral articulation demonstrates  denuded articular cartilage throughout the lateral facet and apex patella as  well as portions the apex. There is remodeling  the subchondral bone plate.  Also, diffuse loss of cartilage throughout the central lateral trochlea with  remodeling of the subchondral bone plate in the lateral trochlea with  subchondral cystic change demonstrated.     Lateral femorotibial compartment demonstrates mild thinning the cartilage  within the weightbearing portion the compartment. Osteophytosis noted along  outer margin compartment. Medial femorotibial compartment demonstrates  severe thinning the articular cartilage within portions of posterior  weightbearing and outer margin the compartment. Mild subchondral reactive  edema.     There is no knee joint effusion. No significant intra-articular bodies  demonstrated.     IMPRESSION MRI of the left knee October 4, 2021:  1. Diminutive posterior horn the medial meniscus about the root ligament  insertion in relation to displaced meniscal flap arising from the root  ligament insertion extending centrally into the intercondylar notch with  flap measuring up to 2.1 x 1.5 cm in AP and transverse dimension  respectively.  2. Severe patellofemoral and moderate to severe medial femorotibial  osteoarthritis.     This report has been produced using speech recognition.     Original Interpreting Physician:   GEOVANI HEALY MD  Original Transcribed by/Date: [facility]   Oct  4 2021  3:01P  Original Electronically Signed by/Date: GEOVANI HEALY MD Oct  4 2021  3:01P     Addendum Interpreting Physician:  Addendum Transcribed by/Date: NO ADDENDUM  Addendum Electronically Signed by/Date:   ----------------------------------------------------------------------  MR Right knee  PROCEDURE:         KNEE RT WO - WMR  2186  REASON FOR EXAM: M25.561 PAIN IN RIGHT KNEE     RESULT: CLINICAL HISTORY: Pain/arthritis/7 out of 10 symptoms     COMPARISON: X-ray knee 11/3/2021     TECHNIQUE: Multiple, multiplanar sequences of the RIGHT knee were obtained.     FINDINGS:     The lateral meniscus is intact.     Macerated appearance of the  posterior horn and body segment of the medial  meniscus can be identified. Meniscal body extrusion is noted.     The PCL is intact. Severe mucoid degeneration versus chronic partial tearing  of the ACL is identified.     The MCL and lateral ligamentous complex are intact. Reactive edema is seen  surrounding the MCL. MCL bursitis is noted.     Complete loss of patellar articular cartilage can be identified. Mild  underlying subchondral cystic changes are noted.     There is fissuring of the anterior weightbearing surface of the lateral  tibiofemoral articular cartilage best seen on sagittal image 21.     There is complete loss of medial tibiofemoral articular cartilage along the  weightbearing surface of the medial tibiofemoral compartment.     The quadriceps and patellar tendons are intact. Large joint effusion and  chronic lipomatous hypertrophy of the synovium can be seen. Complex Foster's  cyst is identified.     No acute fracture or dislocation is identified. Subchondral cystic changes  and marrow edema can be seen within the medial femoral condyle and medial  tibial plateau.     A large 2.1 x 1.4 x 1.1 cm loose body is identified within the tibiofibular  joint space.     IMPRESSION MRI of the right knee November 18, 2021:  1. Macerated appearance of the body segment and posterior horn medial  meniscus.  2. Severe patellofemoral and medial tibiofemoral osteoarthrosis with areas  of complete chondral loss.  3. MCL bursitis.  4. Large joint effusion and chronic synovitis.  5. Complex Baker's cyst.  6. 2.1 x 1.4 x 1.1 cm loose body within the tibiofemoral joint space  posteriorly.  7. Severe mucoid degeneration versus chronic partial tearing of the ACL.     [id number]     This report has been produced using speech recognition.     Original Interpreting Physician:   JOCELYN GOVEA MD  Original Transcribed by/Date: [facility]   Nov 18 2021  8:18A  Original Electronically Signed by/Date: JOCELYN GOVEA MD Nov 18 2021   8:18A     Addendum Interpreting Physician:  Addendum Transcribed by/Date: NO ADDENDUM  Addendum Electronically Signed by/Date:       Assessment   1. Degenerative tear of medial meniscus of left knee        2. Chronic pain of both knees  Referral to Physical Therapy    XR knee 4+ views bilateral    MR knee left wo IV contrast    MR knee right wo IV contrast      3. Chronic pain syndrome  Referral to Physical Therapy    XR knee 4+ views bilateral    MR knee left wo IV contrast    MR knee right wo IV contrast      4. Primary osteoarthritis of both knees  Referral to Physical Therapy    XR knee 4+ views bilateral    MR knee left wo IV contrast    MR knee right wo IV contrast      5. Weakness of right lower extremity  Referral to Physical Therapy    XR knee 4+ views bilateral    MR knee left wo IV contrast    MR knee right wo IV contrast      6. Patellofemoral arthralgia of both knees  Referral to Physical Therapy    XR knee 4+ views bilateral    MR knee left wo IV contrast    MR knee right wo IV contrast      7. Derangement of posterior horn of medial meniscus due to old tear or injury, left knee  Referral to Physical Therapy    XR knee 4+ views bilateral    MR knee left wo IV contrast      8. Derang of medial meniscus due to old tear/inj, right knee  Referral to Physical Therapy    XR knee 4+ views bilateral    MR knee right wo IV contrast      9. Baker's cyst of knee, right  Referral to Physical Therapy    XR knee 4+ views bilateral    MR knee right wo IV contrast      10. Effusion of both knee joints  Referral to Physical Therapy    XR knee 4+ views bilateral    MR knee left wo IV contrast    MR knee right wo IV contrast      11. Primary osteoarthritis of left knee        12. Primary osteoarthritis of right knee        13. Degenerative tear of posterior horn of medial meniscus of right knee        14. Cyst, baker's knee, right        15. 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 Patellar bursitis of right knee         17. Loose body in knee, right knee            Treatment or Intervention:  May continue to alternate ice and moist heat as needed  Reviewed degenerative meniscus tears, degenerative joint disease of the knees, patellofemoral tracking syndrome, knee instability, and/or patellar tendinitis in detail with the patient to the level of their understanding; a copy of this handout was provided to the patient at the time of this office visit.  Start into aquatic and physical Therapy 1-2 times a week for 8-10 weeks with manual therapy as well as dry needling and IASTM  Reviewed home exercises to be performed by the patient routinely  Stressed the importance of wearing shoes with good stability control to help with the biomechanics affecting the knees and lower extremities  Stressed the importance of wearing full foot insoles to help with the biomechanics affecting the knees and lower extremities  Recommendation over-the-counter calcium with vitamin-D 2 -3000+ milligrams a day, as well as  a daily multivitamin.   Recommendation over-the-counter curcumin, turmeric, boswellia, as well as egg shell membrane as directed to aid with joint inflammation.  Recommendation over-the-counter Move Free for joint health.  Patient advised regarding the risks and/or potential adverse reactions and/or side effects of any prescribed medications along with any over-the-counter medications or any supplements used. Patient advised to seek immediate medical care if any adverse reactions occur. The patient and/or patient(s) parent(s) verbalized their understanding  MRI bilateral knees to rule ligament or tendon injury versus stress fracture versus other  You have been ordered an MRI of the bilateral knees. Once you contact scheduling at [phone] and obtain the date and time of your MRI, contact our office at [phone] to schedule your follow-up appointment to review your results. Please note the results of your imaging will not be  discussed over the telephone.   Discussed in detail with the patient to the level of their understanding the possibility in the future of regenerative injections versus corticosteroid injections versus viscosupplementation injections versus a combination  At the patient's next office visit, will provide appropriate __ millimeter heel lift to be placed in the RIGHT/LEFT shoe to accommodate for leg length discrepancy found on standing erect pelvis xray  Staff to initiate prior authorization process for viscosupplementation injections into the patient's B/L knees; staff will contact the patient to schedule injections once approval is received.  However if decide to get surgical intervention done will only do viscosupplementation injections on right knee  Follow-up after BILATERAL knee MRI or in 2-4 weeks for a reevaluation and possible xray or sooner as needed          JASON CHANG on 6/30/25 at 12:37 PM.     Please note that this report has been produced using speech recognition software.  It may contain errors related to grammar, punctuation or spelling.  Electronically signed, but not reviewed.      Jason Chang D.O. FAOASM          [1]   Family History  Problem Relation Name Age of Onset    Hypertension Mother Digna Garcia     Other (Brain Tumor) Mother Digna Garcia     Diabetes Mother Digna Garcia     Other (Bladder Cancer) Father      Other (cyst on spine) Sister      Melanoma Brother

## 2025-06-30 ENCOUNTER — HOSPITAL ENCOUNTER (OUTPATIENT)
Dept: RADIOLOGY | Facility: CLINIC | Age: 75
Discharge: HOME | End: 2025-06-30
Payer: MEDICARE

## 2025-06-30 ENCOUNTER — OFFICE VISIT (OUTPATIENT)
Dept: ORTHOPEDIC SURGERY | Facility: CLINIC | Age: 75
End: 2025-06-30
Payer: MEDICARE

## 2025-06-30 VITALS
HEIGHT: 71 IN | DIASTOLIC BLOOD PRESSURE: 82 MMHG | WEIGHT: 276 LBS | HEART RATE: 72 BPM | BODY MASS INDEX: 38.64 KG/M2 | SYSTOLIC BLOOD PRESSURE: 135 MMHG

## 2025-06-30 DIAGNOSIS — M17.0 PRIMARY OSTEOARTHRITIS OF BOTH KNEES: ICD-10-CM

## 2025-06-30 DIAGNOSIS — M23.204 DEGENERATIVE TEAR OF MEDIAL MENISCUS OF LEFT KNEE: Primary | ICD-10-CM

## 2025-06-30 DIAGNOSIS — M23.222 DERANGEMENT OF POSTERIOR HORN OF MEDIAL MENISCUS DUE TO OLD TEAR OR INJURY, LEFT KNEE: ICD-10-CM

## 2025-06-30 DIAGNOSIS — M23.231 DERANG OF MEDIAL MENISCUS DUE TO OLD TEAR/INJ, RIGHT KNEE: ICD-10-CM

## 2025-06-30 DIAGNOSIS — M25.00: ICD-10-CM

## 2025-06-30 DIAGNOSIS — M23.41 LOOSE BODY IN KNEE, RIGHT KNEE: ICD-10-CM

## 2025-06-30 DIAGNOSIS — M25.562 CHRONIC PAIN OF BOTH KNEES: ICD-10-CM

## 2025-06-30 DIAGNOSIS — M25.461 EFFUSION OF BOTH KNEE JOINTS: ICD-10-CM

## 2025-06-30 DIAGNOSIS — M17.12 PRIMARY OSTEOARTHRITIS OF LEFT KNEE: ICD-10-CM

## 2025-06-30 DIAGNOSIS — M71.21 BAKER'S CYST OF KNEE, RIGHT: ICD-10-CM

## 2025-06-30 DIAGNOSIS — M22.2X2 PATELLOFEMORAL ARTHRALGIA OF BOTH KNEES: ICD-10-CM

## 2025-06-30 DIAGNOSIS — M71.21: ICD-10-CM

## 2025-06-30 DIAGNOSIS — R29.898 WEAKNESS OF RIGHT LOWER EXTREMITY: ICD-10-CM

## 2025-06-30 DIAGNOSIS — G89.29 CHRONIC PAIN OF BOTH KNEES: ICD-10-CM

## 2025-06-30 DIAGNOSIS — M25.561 CHRONIC PAIN OF BOTH KNEES: ICD-10-CM

## 2025-06-30 DIAGNOSIS — G89.4 CHRONIC PAIN SYNDROME: ICD-10-CM

## 2025-06-30 DIAGNOSIS — M25.462 EFFUSION OF BOTH KNEE JOINTS: ICD-10-CM

## 2025-06-30 DIAGNOSIS — M70.51 PATELLAR BURSITIS OF RIGHT KNEE: ICD-10-CM

## 2025-06-30 DIAGNOSIS — M17.11 PRIMARY OSTEOARTHRITIS OF RIGHT KNEE: ICD-10-CM

## 2025-06-30 DIAGNOSIS — M23.321 DEGENERATIVE TEAR OF POSTERIOR HORN OF MEDIAL MENISCUS OF RIGHT KNEE: ICD-10-CM

## 2025-06-30 DIAGNOSIS — M65.90: ICD-10-CM

## 2025-06-30 DIAGNOSIS — M22.2X1 PATELLOFEMORAL ARTHRALGIA OF BOTH KNEES: ICD-10-CM

## 2025-06-30 PROBLEM — M70.50 PATELLAR BURSITIS: Status: ACTIVE | Noted: 2025-06-30

## 2025-06-30 PROCEDURE — 3075F SYST BP GE 130 - 139MM HG: CPT | Performed by: FAMILY MEDICINE

## 2025-06-30 PROCEDURE — 99213 OFFICE O/P EST LOW 20 MIN: CPT | Performed by: FAMILY MEDICINE

## 2025-06-30 PROCEDURE — 3008F BODY MASS INDEX DOCD: CPT | Performed by: FAMILY MEDICINE

## 2025-06-30 PROCEDURE — 1036F TOBACCO NON-USER: CPT | Performed by: FAMILY MEDICINE

## 2025-06-30 PROCEDURE — 99214 OFFICE O/P EST MOD 30 MIN: CPT | Performed by: FAMILY MEDICINE

## 2025-06-30 PROCEDURE — 73564 X-RAY EXAM KNEE 4 OR MORE: CPT | Mod: BILATERAL PROCEDURE | Performed by: RADIOLOGY

## 2025-06-30 PROCEDURE — 73564 X-RAY EXAM KNEE 4 OR MORE: CPT | Mod: 50

## 2025-06-30 PROCEDURE — 1125F AMNT PAIN NOTED PAIN PRSNT: CPT | Performed by: FAMILY MEDICINE

## 2025-06-30 PROCEDURE — 3079F DIAST BP 80-89 MM HG: CPT | Performed by: FAMILY MEDICINE

## 2025-06-30 PROCEDURE — 1159F MED LIST DOCD IN RCRD: CPT | Performed by: FAMILY MEDICINE

## 2025-06-30 ASSESSMENT — ENCOUNTER SYMPTOMS
OCCASIONAL FEELINGS OF UNSTEADINESS: 0
DEPRESSION: 0
LOSS OF SENSATION IN FEET: 0

## 2025-06-30 ASSESSMENT — PAIN - FUNCTIONAL ASSESSMENT: PAIN_FUNCTIONAL_ASSESSMENT: 0-10

## 2025-06-30 ASSESSMENT — PAIN DESCRIPTION - DESCRIPTORS: DESCRIPTORS: ACHING;SORE

## 2025-06-30 ASSESSMENT — PAIN SCALES - GENERAL
PAINLEVEL_OUTOF10: 7
PAINLEVEL_OUTOF10: 7

## 2025-06-30 NOTE — RESULT ENCOUNTER NOTE
X-ray of the knee reveals severe bilateral medial osteoarthritis.  Continue management as prescribed and recommendation will be to follow-up with orthopedic surgery if not improving.

## 2025-06-30 NOTE — PATIENT INSTRUCTIONS
May continue to alternate ice and moist heat as needed  Reviewed handout degenerative joint disease of the knees, patellofemoral tracking syndrome, and/or patellar tendinitis in detail with the patient to the level of their understanding; a copy of this handout was provided to the patient at the time of this office visit.  Start into Physical Therapy 1-2 times a week for 8-10 weeks with manual therapy as well as dry needling and IASTM  Reviewed home exercises to be performed by the patient routinely  Stressed the importance of wearing shoes with good stability control to help with the biomechanics affecting the knees and lower extremities  Stressed the importance of wearing full foot insoles to help with the biomechanics affecting the knees and lower extremities  Recommendation over-the-counter calcium with vitamin-D 2 -3000+ milligrams a day, as well as OTC symphytum as directed daily to promote bony healing, in addition to a daily multivitamin.   Recommendation over-the-counter curcumin, turmeric, boswellia, as well as egg shell membrane as directed to aid with joint inflammation.  Recommendation over-the-counter Move Free for joint health.  Patient advised regarding the risks and/or potential adverse reactions and/or side effects of any prescribed medications along with any over-the-counter medications or any supplements used. Patient advised to seek immediate medical care if any adverse reactions occur. The patient and/or patient(s) parent(s) verbalized their understanding  You have been ordered an MRI of the bilateral knees. Once you contact scheduling at [phone] and obtain the date and time of your MRI, contact our office at [phone] to schedule your follow-up appointment to review your results. Please note the results of your imaging will not be discussed over the telephone.   Discussed in detail with the patient to the level of their understanding the possibility in the future of regenerative  injections versus corticosteroid injections versus viscosupplementation injections versus a combination  At the patient's next office visit, will provide appropriate __ millimeter heel lift to be placed in the RIGHT/LEFT shoe to accommodate for leg length discrepancy found on standing erect pelvis xray  Staff to initiate prior authorization process for viscosupplementation injections into the patient's LEFT knees; staff will contact the patient to schedule injections once approval is received.  Follow-up after BILATERAL knee MRI or in 2-4 weeks for a reevaluation and possible xray or sooner as needed

## 2025-07-01 ENCOUNTER — HOSPITAL ENCOUNTER (OUTPATIENT)
Dept: RADIOLOGY | Facility: CLINIC | Age: 75
Discharge: HOME | End: 2025-07-01
Payer: MEDICARE

## 2025-07-01 ENCOUNTER — TELEPHONE (OUTPATIENT)
Dept: ORTHOPEDIC SURGERY | Facility: CLINIC | Age: 75
End: 2025-07-01
Payer: MEDICARE

## 2025-07-01 DIAGNOSIS — M25.561 CHRONIC PAIN OF BOTH KNEES: ICD-10-CM

## 2025-07-01 DIAGNOSIS — R29.898 WEAKNESS OF RIGHT LOWER EXTREMITY: ICD-10-CM

## 2025-07-01 DIAGNOSIS — M17.0 PRIMARY OSTEOARTHRITIS OF BOTH KNEES: ICD-10-CM

## 2025-07-01 DIAGNOSIS — M23.231 DERANG OF MEDIAL MENISCUS DUE TO OLD TEAR/INJ, RIGHT KNEE: ICD-10-CM

## 2025-07-01 DIAGNOSIS — M22.2X2 PATELLOFEMORAL ARTHRALGIA OF BOTH KNEES: ICD-10-CM

## 2025-07-01 DIAGNOSIS — M23.222 DERANGEMENT OF POSTERIOR HORN OF MEDIAL MENISCUS DUE TO OLD TEAR OR INJURY, LEFT KNEE: ICD-10-CM

## 2025-07-01 DIAGNOSIS — M22.2X1 PATELLOFEMORAL ARTHRALGIA OF BOTH KNEES: ICD-10-CM

## 2025-07-01 DIAGNOSIS — G89.29 CHRONIC PAIN OF BOTH KNEES: ICD-10-CM

## 2025-07-01 DIAGNOSIS — M25.462 EFFUSION OF BOTH KNEE JOINTS: ICD-10-CM

## 2025-07-01 DIAGNOSIS — M71.21 BAKER'S CYST OF KNEE, RIGHT: ICD-10-CM

## 2025-07-01 DIAGNOSIS — M25.562 CHRONIC PAIN OF BOTH KNEES: ICD-10-CM

## 2025-07-01 DIAGNOSIS — M25.461 EFFUSION OF BOTH KNEE JOINTS: ICD-10-CM

## 2025-07-01 DIAGNOSIS — G89.4 CHRONIC PAIN SYNDROME: ICD-10-CM

## 2025-07-01 PROCEDURE — 73721 MRI JNT OF LWR EXTRE W/O DYE: CPT | Mod: RIGHT SIDE | Performed by: RADIOLOGY

## 2025-07-01 PROCEDURE — 73721 MRI JNT OF LWR EXTRE W/O DYE: CPT | Mod: LT

## 2025-07-01 PROCEDURE — 73721 MRI JNT OF LWR EXTRE W/O DYE: CPT | Mod: RT

## 2025-07-01 PROCEDURE — 73721 MRI JNT OF LWR EXTRE W/O DYE: CPT | Mod: LEFT SIDE | Performed by: RADIOLOGY

## 2025-07-01 NOTE — RESULT ENCOUNTER NOTE
MRI of the right knee reveals tricompartmental osteoarthritis with severe osteoarthritis in certain compartments.  Degeneration and tearing of the medial meniscus.    Recommend follow-up with orthopedic surgery

## 2025-07-01 NOTE — TELEPHONE ENCOUNTER
07/01/2025-Initiated benefits investigation via [id number] for Viscosupplementation Injections into the patients B/L KNEES.

## 2025-07-01 NOTE — RESULT ENCOUNTER NOTE
MRI of the left knee reveals stable meniscal flap meniscus, tricompartmental osteoarthritis with severity in certain compartments.  Muscle strain of the lateral head of the gastroc.    Recommend follow-up with orthopedic surgery

## 2025-07-02 NOTE — PROGRESS NOTES
Verbal consent of the patient and/or verbal parental consent for patients under the age of 18 have been obtained to conduct a physical examination at this office visit.    The  Srinivasa ALVARADO was present in the room during the entire visit including, but not limited to the physical examination.    Established patient    History Of Present Illness  07/03/25 Tito Garcia is a 74 y.o. male who presents for MRI review of their BILATERAL knees. States that he continues to have 7/10 pain. States that he has been resting since he was last seen and feels slight relief. No new or worsening symptoms since he was last seen. States that he feels ready to have his right knee replaced at this time and will be reaching back out to Dr. Raphael.  Apparently according to Dr. Raphael the reason he canceled his surgeries before was because of swelling in his lower legs that he is having few be evaluated for apparently coming up in the near future.  He tentatively is scheduled for a knee replacement in October.      All previous Progress Notes and imaging results related to this patients chief complaint have been reviewed in preparation for this examination.    Past Medical History  He has a past medical history of A-fib (Multi), Arrhythmia, Arthritis, Headache, Hyperlipidemia, Hypertension, and Other bursal cyst, other site.    He has no past medical history of Cataract.    Surgical History  He has a past surgical history that includes Other surgical history (11/10/2022); Other surgical history (11/10/2022); Other surgical history (11/10/2022); MR angio head wo IV contrast (8/1/2021); Back surgery (2010); Circumcision, primary (1950); Eye surgery (3/2024); Hernia repair (1955); Prostate surgery (2020); Tonsillectomy (?); and [place] tooth extraction (?).     Social History  He reports that he has quit smoking. His smoking use included cigarettes. He has a 15 pack-year smoking history. He has been exposed to tobacco smoke. He has  never used smokeless tobacco. He reports that he does not currently use alcohol. He reports that he does not use drugs.    Family History  Family History[1]     Allergies  Patient has no known allergies.    Historical Clinical Intake  06/30/25 Tito Garcia is a 74 y.o. male who presents for an evaluation of their BILATERAL legs. States that his primary complaint are his knees. He has history of severe tricompartmental arthritis. He was scheduled to have both knees replaced a few years ago by Dr. Raphael however due to heart issues and a DVT at the time he has not been able to do so. States that the left knee is typically worse than the right. 7/10 pain today. States that his pain wraps around but is primarily along the medial joint lines. He has had cortisone injections in his knees in the past and is unsure if he has had viscosupplementation injections. He continues to do cardio exercises for 30 minutes daily by walking and stationary bike and elliptical. He is expecting to have his right knee replaced in October.  He says additionally it has been tough because he has been taking care of his wife who has severe Alzheimer's/dementia that is progressed significantly over the past 2 years enough so that she is not able to take care of herself at times that he has to do so.  He is hoping he can get into some aquatic and physical therapy to get his strength out so he could continue to function to take care of his wife as well as himself.  He also wants to try to continue to lose some weight so if he is able to get his knee replaced he will have better results.  He also says he needs updated imaging for the surgery and also wondering if there is anything he can do for his other knee that he is not getting surgery on to get by.     Review of Systems  CONSTITUTIONAL:   Negative for weight change, loss of appetite, fatigue, weakness, fever, chills, night sweats, headaches .           HEENT:   Negative for cold, cough, sore  throat, sinus pain, swollen lymph nodes.           OPHTHALMOLOGY:   Negative for diminished vision, blurred vision, loss of vision, double vision.           ALLERGY:   Negative for runny nose, scratchy throat, sinus congestion, rash, facial pressure, nasal congestion, post-nasal drip.           CARDIOLOGY:   Negative for chest pain, palpitations, murmurs, irregular heart beat, shortness of breath, leg edema, dyspnea on exertion, fatigue, dizziness.           RESPIRATORY:   Negative for chest pain, shortness of breath, swelling of the legs, asthma/copd, chest congestion, pain with breathing .           GASTROENTEROLOGY:   Negative for nausea, vomitting, heartburn, constipation, diarrhea, blood in stool, change in bowel habits, black stool.           HEMATOLOGY/LYMPH:   Negative for fatigue, loss of appetitie, easy bruising, easy bleeding, anemia, abnormal bleeding, slow healing.           ENDOCRINOLOGY:   Negative for polyuria, polydipsia, polyphagia, fatigue, weight loss, weight gain, cold intolerance, heat intolerance, diabetes.           MUSCULOSKELETAL:   Positive  for BILATERAL knee pain        DERMATOLOGY:   Negative for rash, bruising.           NEUROLOGY:   Negative for tingling, numbness, gait abnormality, paresthesias, weakness, sciatica.        Examination: All findings no change since earlier this week  BILATERAL Medial Joint Line over the Medial Meniscus  Edema: Positive  Diffusely.   Effusion: Negative.   Percussion Test:  Negative.   Tuning Fork Test:  Negative.   Ecchymosis/Bruising: Negative.            Palpation: All findings no change since earlier this week  Positive  BILATERAL  Medial Joint Line over the Medial Meniscus    Orientation: All findings no change since earlier this week  Positive Asymmetrical: because of the swelling.     Range of Motion:  All findings no change since earlier this week  Positive Knee Flexion (RIGHT 88 degrees, LEFT 90 degrees) Decreased because of pain and  swelling  Positive Knee Extension (RIGHT 164 degrees, LEFT 160 degrees) Decreased because of pain and swelling           Muscle Strength:  All findings no change since earlier this week  Positive +4/+5 Quadricep Extension Causes pain  Positive +4/+5 Hamstring Flexion Causes pain  +5+5 Gastrocnemius  +5+5 Soleus.            Proprioception: All findings no change since earlier this week  Pain with Squat: Positive    Pain with Sumo Squat:  Positive   Hop Test: Positive    Single leg balance test Positive            Vascular:   +2/+4 Femoral  +2/+4 Dorsalis Pedis  +2/+4 Posterior Tibial  Capillary Refill less than 2 seconds.                Knee - ACL:  Anterior Drawer Test: Negative   Lachman Test: Negative    Lelli Test: Negative                    KNEE - PCL/POSTERIOR LATERAL CORNER:  Posterior Drawer Test: Negative  Reverse Lachman Test: Negative     KNEE - POPLITEUS:All findings no change since earlier this week  Cyrus's Test: Positive     KNEE - MCL / LCL:  Valgus Stress Test: 90 degrees: [id number] degrees:Negative   Varus Stress Test:  90 degrees: Negative, 20-30 degrees: Negative.   Apley Distraction Test: Negative  Thessaly Test: Unable to perform       KNEE - MENISCUS:All findings no change since earlier this week  Apley Compression Test:  Positive       Stienmann Test:  Positive   Shasha Test:  Positive     Bounce Home Test:  Positive  Thessaly Test: Unable to perform    KNEE - PATELLA:All findings no change since earlier this week  Apprehension Test:  Positive  Glide Test:  Positive  Grind Test: Positive  Patella Tracking Test: Positive  Fat pad Impingement: Positive             KNEE - QUADRICEPS:  All findings no change since earlier this week       VMO Test: Positive   VLO Test:  Negative    Hip/Pelvis - Sacrum:  Leg Length Supine: Positive    Leg Length Supine to Seated (Derbolowsky Sign): Positive   Standing Flexion Test: Positive    Seated Flexion Test: Positive    Spring Test:    Sacral Somatic  Dysfunction: Positive   Hip Flexor Tightness: Positive   Hamstring Tightness: Positive           Feet/Foot: Positive BILATERAL Valgus foot         Imaging and Diagnostics Review:  MR knee right wo IV contrast  Order date: 7/1/2025  Interpreted By:  Butch Luis,   STUDY:  MR KNEE RIGHT WO IV CONTRAST; ;  7/1/2025 9:58 am      FINDINGS:  Anterior cruciate ligament demonstrates diffuse mucoid degeneration  with osteophytosis along inner margin of the lateral femoral condyle  displaces the ACL.      Posterior cruciate ligament is intact. There is intermediate signal  intensity in the distal PCL about the tibial attachment as seen on  prior imaging with sequela of chronic low-grade sprain.      Lateral meniscus demonstrates tear along the free edge of the  posterior horn of the lateral meniscus near the root ligament  insertion.      Medial meniscus demonstrates macerated posterior horn of the medial  meniscus in particular near the posterior body segment. There is  blunted diminutive posterior horn with irregular appearing tear  within the remainder of the posterior horn. Body of the medial  meniscus demonstrates peripheral extrusion and blunted appearance  with tear along the posterior body segment.      Medial collateral ligament complex is bowed in relation to medial  femorotibial osteoarthritis.      Lateral supporting structures are intact. Mild chronic thickened  proximal fibular collateral ligament.      Extensor mechanism is intact. There is a moderate infrapatellar  subcutaneous edema. Patellofemoral articulation demonstrates severe  loss of articular cartilage throughout the lateral facet/apex of the  patella as well as corresponding lateral trochlea with moderate  thinning throughout the remainder of the trochlea.      Lateral femorotibial compartment demonstrates mild thinning of the  cartilage of the outer margin of the compartment. There is severe  medial femorotibial osteoarthritis with denuded  articular cartilage  and subchondral reactive edema and osteophytosis demonstrated.      Small knee joint effusion. Subtle popliteal cyst demonstrated.      IMPRESSION right knee MRI July 1, 2025:  1. 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  2. Macerated posterior horn of the medial meniscus at the posterior  body segment with irregular appearing tear within the remainder of  the posterior horn.  3. Diffuse mucoid degeneration of the ACL which is displaced by  osteophytosis inner margin of the lateral femoral condyle.  4. Lateral meniscus tear free edge posterior horn  5. PCL sprain chronic  6. MCL bowing  7. LCL thickening  8. Subtle popliteal Cyst  9. Small joint effusion    Signed by: Butch Luis 7/1/2025 10:41 AM  Dictation workstation:   [id number]     --------------------------------------------------------------------  MR knee left wo IV contrast  Order date: 7/1/2025  Interpreted By:  Butch Luis,   STUDY:  MR KNEE LEFT WO IV CONTRAST; ;  7/1/2025 9:57 am      FINDINGS:  Anterior cruciate ligament is intact. Mild mucoid degeneration of the  ACL. Osteophytosis inner margin of the lateral femoral condyle abuts  the ACL. There is pericruciate ganglion cyst formation about the  femoral attachment of the ACL measuring 6 mm.      Posterior cruciate ligament is intact.      Lateral meniscus demonstrates mild degeneration of the posterior  horn. No discrete longitudinal tear. Blunted free edge demonstrated.      Medial meniscus demonstrates diminutive posterior horn of the medial  meniscus from the mid posterior horn towards the root ligament  insertion with resultant displaced meniscal flap identified centrally  in the medial femorotibial compartment with meniscal flap identified  measuring 1.8 cm (series 10, image 21 and series 8, image 25).  Alternatively, this may reflect discoid meniscus, but there is a  irregularity of the free edge of the  body. Findings are stable from  the remote MRI.      Medial collateral ligament complex is bowed in relation to medial  femorotibial osteoarthritis.      Lateral supporting structures are intact. Moderate popliteus  tendinosis demonstrated      Extensor mechanism is intact. There is mild distal quadriceps  tendinosis. Mild prepatellar and infrapatellar subcutaneous edema.  Patellofemoral articulation demonstrates severe loss of articular  cartilage throughout the lateral facet and apex of the patella mild  subchondral cystic change demonstrated. Also, corresponding severe  thinning of the articular cartilage within the lateral trochlea with  remodeling of the subchondral bone plate and subchondral reactive  edema. Osteophytosis demonstrated along the outer margin of the  medial and lateral trochlear ridge.      Lateral femorotibial compartment demonstrates mild chondromalacia.  There is a mild thinning of the cartilage along the outer margin of  the compartment. Medial femorotibial compartment demonstrates severe  loss of articular cartilage central to outer margin weight-bearing  portion of the compartment with subchondral reactive edema and cystic  change demonstrated. Mild osteophytosis      No knee joint effusion. There is a 5 mm body in the small popliteal  cyst. Musculature about the knee demonstrates intermediate signal  intensity and strain of the plantaris and portions of the lateral  head of the gastrocnemius at the origin.      IMPRESSION left knee MRI July 1, 2025 :  1. Stable appearing medial meniscus with diminutive posterior horn  from near the mid posterior horn towards the root ligament insertion.  Of note, there is a equivocal displaced meniscal flap centrally in  the intercondylar notch as opposed to discoid medial meniscus with  findings stable from prior imaging.  2. Tricompartmental osteoarthritis with severe patellofemoral and  medial femorotibial osteoarthritis.  3. Mild muscle strain of the  lateral head gastrocnemius and plantaris  musculature.  4. Lateral meniscus degeneration  5. ACL degeneration with pericruciate ganglion cyst  6. MCL Bowing  7. Popliteal tendinosis      Signed by: Butch Luis 7/1/2025 10:33 AM  Dictation workstation:   [id number]     ---------------------------------------------------------------------   MR Left knee 10/04/2021    IMPRESSION MRI of the left knee October 4, 2021:  1. Diminutive posterior horn the medial meniscus about the root ligament  insertion in relation to displaced meniscal flap arising from the root  ligament insertion extending centrally into the intercondylar notch with  flap measuring up to 2.1 x 1.5 cm in AP and transverse dimension  respectively.  2. Severe patellofemoral and moderate to severe medial femorotibial  osteoarthritis.   ----------------------------------------------------------------------  MR Right knee November 18, 2021  IMPRESSION MRI of the right knee :  1. Macerated appearance of the body segment and posterior horn medial  meniscus.  2. Severe patellofemoral and medial tibiofemoral osteoarthrosis with areas  of complete chondral loss.  3. MCL bursitis.  4. Large joint effusion and chronic synovitis.  5. Complex Baker's cyst.  6. 2.1 x 1.4 x 1.1 cm loose body within the tibiofemoral joint space  posteriorly.  7. Severe mucoid degeneration versus chronic partial tearing of the ACL.       Assessment   1. Derangement of other medial meniscus due to old tear or injury, right knee        2. Primary osteoarthritis of left knee  Referral to Orthopedics and Sports Medicine      3. Degenerative tear of posterior horn of medial meniscus of right knee  Referral to Orthopedics and Sports Medicine      4. Patellar bursitis of left knee  Referral to Orthopedics and Sports Medicine      5. Loose body in knee, right knee  Referral to Orthopedics and Sports Medicine      6. Cyst, baker's knee, right  Referral to Orthopedics and Sports Medicine       7. Degenerative tear of medial meniscus of left knee  Referral to Orthopedics and Sports Medicine      8. Primary osteoarthritis of right knee  Referral to Orthopedics and Sports Medicine      9. Chronic synovitis due to hemarthrosis  Referral to Orthopedics and Sports Medicine      10. Derangement of posterior horn of medial meniscus due to old tear or injury, left knee  Referral to Orthopedics and Sports Medicine      11. Derang of medial meniscus due to old tear/inj, right knee  Referral to Orthopedics and Sports Medicine      12. Baker's cyst of knee, right  Referral to Orthopedics and Sports Medicine      13. Degenerative tear of posterior horn of lateral meniscus of right knee        14. Derangement of unspecified medial meniscus due to old tear or injury, left knee        15. Muscle weakness (generalized)        16. Muscle strain of lower leg, left, subsequent encounter        17. Weakness of right lower extremity        18. Valgus deformity of both feet              Treatment or Intervention:  May continue to alternate ice and moist heat as needed  Reviewed degenerative meniscus tears, degenerative joint disease of the knees, patellofemoral tracking syndrome, knee instability, and/or patellar tendinitis in detail with the patient to the level of their understanding; a copy of this handout was provided to the patient at the time of this office visit.  Make sure to start into aquatic and physical Therapy 1-2 times a week for 8-10 weeks with manual therapy as well as dry needling and IASTM  Once again reviewed home exercises to be performed by the patient routinely  Once again stressed the importance of wearing shoes with good stability control to help with the biomechanics affecting the knees and lower extremities  Once again stressed the importance of wearing full foot insoles to help with the biomechanics affecting the knees and lower extremities  Recommendation to continue over-the-counter calcium with  vitamin-D 2 -3000+ milligrams a day, as well as  a daily multivitamin.   Recommendation to continue over-the-counter curcumin, turmeric, boswellia, as well as egg shell membrane as directed to aid with joint inflammation.  Recommendation to continue over-the-counter Move Free for joint health.  Patient advised regarding the risks and/or potential adverse reactions and/or side effects of any prescribed medications along with any over-the-counter medications or any supplements used. Patient advised to seek immediate medical care if any adverse reactions occur. The patient and/or patient(s) parent(s) verbalized their understanding  Reviewed bilateral knee MRI in detail with the patient and/or patients parent/legal guardian to their level of understanding; a copy of these results were provided to the patient and/or patients parent/legal guardian at the time of this office visit.  Discussed in detail with the patient to the level of their understanding the possibility in the future of regenerative injections versus corticosteroid injections versus viscosupplementation injections versus a combination if decide against surgical intervention or with what ever knee he decides not to get surgery on  After patient's knee replacement we will get back into the office to check for leg length discrepancy, will provide appropriate __ millimeter heel lift to be placed in the RIGHT/LEFT shoe to accommodate for leg length discrepancy found on standing erect pelvis xray  Make sure to keep follow-up appointments with Dr. Raphael and I also told him because of Dr. Raphael schedule he needs to get back in touch with them to tell him what knee he needs to get done and to get in for reevaluation to make sure that his surgery is scheduled for October which per Dr. Raphael it seems like this is true however he seems to have appointments lined up for him for lower leg evaluations because of swelling  Follow-up after viscosupplementation approval for  whichever knee he decides not to get surgical intervention on        JASON CHANG on 7/3/25 at 12:39 PM.     Please note that this report has been produced using speech recognition software.  It may contain errors related to grammar, punctuation or spelling.  Electronically signed, but not reviewed.      Jason Chang D.O. FAOASM          [1]   Family History  Problem Relation Name Age of Onset    Hypertension Mother Digna Garcia     Other (Brain Tumor) Mother Digna Garcia     Diabetes Mother Digna Garcia     Other (Bladder Cancer) Father      Other (cyst on spine) Sister      Melanoma Brother

## 2025-07-02 NOTE — TELEPHONE ENCOUNTER
07/02/2025- Per Qc[facility]- As per Medicare guidelines, a standard course of treatment for Dot FX  is 3 to 5 injections per knee in a 6-month period. Additional injections may not be considered reasonable and necessary and may deny. For services to be approved there must be radiological evidence to support the diagnosis of osteoarthritis OF THE KNEE ONLY; and patient must have tried and failed with conservative forms of treatment. Plan may request Medical Notes upon receipt of claim. Follows all Medicare Guidelines. Treatment of joints other than the knee are pursuant to [place] [facility] [place] refer to your local CMS website. Ref # [id number] 07/01/2025    SECONDARY- Active since 12/01/2020; Medicare supplement plan F. This plan covers the 20% Medicare coinsurance and Part B deductible. Plan follows all Medicare guidelines. No pre-cert, Medical notes or referrals needed. Ref # [id number] 07/01/2025    APPROVAL SCANNED INTO CHART

## 2025-07-03 ENCOUNTER — OFFICE VISIT (OUTPATIENT)
Dept: ORTHOPEDIC SURGERY | Facility: CLINIC | Age: 75
End: 2025-07-03
Payer: MEDICARE

## 2025-07-03 VITALS
SYSTOLIC BLOOD PRESSURE: 135 MMHG | HEART RATE: 72 BPM | HEIGHT: 71 IN | BODY MASS INDEX: 38.64 KG/M2 | DIASTOLIC BLOOD PRESSURE: 82 MMHG | WEIGHT: 276 LBS

## 2025-07-03 DIAGNOSIS — M71.21 BAKER'S CYST OF KNEE, RIGHT: ICD-10-CM

## 2025-07-03 DIAGNOSIS — M23.41 LOOSE BODY IN KNEE, RIGHT KNEE: ICD-10-CM

## 2025-07-03 DIAGNOSIS — M23.231 DERANG OF MEDIAL MENISCUS DUE TO OLD TEAR/INJ, RIGHT KNEE: ICD-10-CM

## 2025-07-03 DIAGNOSIS — M65.90: ICD-10-CM

## 2025-07-03 DIAGNOSIS — M25.00: ICD-10-CM

## 2025-07-03 DIAGNOSIS — M23.222 DERANGEMENT OF POSTERIOR HORN OF MEDIAL MENISCUS DUE TO OLD TEAR OR INJURY, LEFT KNEE: ICD-10-CM

## 2025-07-03 DIAGNOSIS — M17.12 PRIMARY OSTEOARTHRITIS OF LEFT KNEE: ICD-10-CM

## 2025-07-03 DIAGNOSIS — M23.204 DERANGEMENT OF UNSPECIFIED MEDIAL MENISCUS DUE TO OLD TEAR OR INJURY, LEFT KNEE: ICD-10-CM

## 2025-07-03 DIAGNOSIS — M23.321 DEGENERATIVE TEAR OF POSTERIOR HORN OF MEDIAL MENISCUS OF RIGHT KNEE: ICD-10-CM

## 2025-07-03 DIAGNOSIS — M23.351 DEGENERATIVE TEAR OF POSTERIOR HORN OF LATERAL MENISCUS OF RIGHT KNEE: ICD-10-CM

## 2025-07-03 DIAGNOSIS — M70.52 PATELLAR BURSITIS OF LEFT KNEE: ICD-10-CM

## 2025-07-03 DIAGNOSIS — Q66.6 VALGUS DEFORMITY OF BOTH FEET: ICD-10-CM

## 2025-07-03 DIAGNOSIS — S86.912D: ICD-10-CM

## 2025-07-03 DIAGNOSIS — M23.204 DEGENERATIVE TEAR OF MEDIAL MENISCUS OF LEFT KNEE: ICD-10-CM

## 2025-07-03 DIAGNOSIS — M23.231 DERANGEMENT OF OTHER MEDIAL MENISCUS DUE TO OLD TEAR OR INJURY, RIGHT KNEE: Primary | ICD-10-CM

## 2025-07-03 DIAGNOSIS — M17.11 PRIMARY OSTEOARTHRITIS OF RIGHT KNEE: ICD-10-CM

## 2025-07-03 DIAGNOSIS — M62.81 MUSCLE WEAKNESS (GENERALIZED): ICD-10-CM

## 2025-07-03 DIAGNOSIS — M71.21: ICD-10-CM

## 2025-07-03 DIAGNOSIS — R29.898 WEAKNESS OF RIGHT LOWER EXTREMITY: ICD-10-CM

## 2025-07-03 PROBLEM — S83.511A RIGHT ACL TEAR: Status: ACTIVE | Noted: 2025-07-03

## 2025-07-03 PROBLEM — Z96.651 STATUS POST TOTAL KNEE REPLACEMENT, RIGHT: Status: RESOLVED | Noted: 2025-04-13 | Resolved: 2025-07-03

## 2025-07-03 PROCEDURE — 99214 OFFICE O/P EST MOD 30 MIN: CPT | Performed by: FAMILY MEDICINE

## 2025-07-03 PROCEDURE — 1125F AMNT PAIN NOTED PAIN PRSNT: CPT | Performed by: FAMILY MEDICINE

## 2025-07-03 PROCEDURE — 3008F BODY MASS INDEX DOCD: CPT | Performed by: FAMILY MEDICINE

## 2025-07-03 PROCEDURE — 3079F DIAST BP 80-89 MM HG: CPT | Performed by: FAMILY MEDICINE

## 2025-07-03 PROCEDURE — 3075F SYST BP GE 130 - 139MM HG: CPT | Performed by: FAMILY MEDICINE

## 2025-07-03 PROCEDURE — 1036F TOBACCO NON-USER: CPT | Performed by: FAMILY MEDICINE

## 2025-07-03 PROCEDURE — 99213 OFFICE O/P EST LOW 20 MIN: CPT | Performed by: FAMILY MEDICINE

## 2025-07-03 PROCEDURE — 1159F MED LIST DOCD IN RCRD: CPT | Performed by: FAMILY MEDICINE

## 2025-07-03 ASSESSMENT — PATIENT HEALTH QUESTIONNAIRE - PHQ9
1. LITTLE INTEREST OR PLEASURE IN DOING THINGS: NOT AT ALL
SUM OF ALL RESPONSES TO PHQ9 QUESTIONS 1 AND 2: 0
2. FEELING DOWN, DEPRESSED OR HOPELESS: NOT AT ALL

## 2025-07-03 ASSESSMENT — PAIN SCALES - GENERAL
PAINLEVEL_OUTOF10: 7
PAINLEVEL_OUTOF10: 7

## 2025-07-03 ASSESSMENT — PAIN - FUNCTIONAL ASSESSMENT: PAIN_FUNCTIONAL_ASSESSMENT: 0-10

## 2025-07-03 ASSESSMENT — ENCOUNTER SYMPTOMS
DEPRESSION: 0
OCCASIONAL FEELINGS OF UNSTEADINESS: 0
LOSS OF SENSATION IN FEET: 0

## 2025-07-03 ASSESSMENT — PAIN DESCRIPTION - DESCRIPTORS: DESCRIPTORS: ACHING;SORE

## 2025-07-08 ENCOUNTER — OFFICE VISIT (OUTPATIENT)
Dept: PRIMARY CARE | Facility: CLINIC | Age: 75
End: 2025-07-08
Payer: MEDICARE

## 2025-07-08 VITALS
DIASTOLIC BLOOD PRESSURE: 58 MMHG | HEART RATE: 65 BPM | OXYGEN SATURATION: 99 % | HEIGHT: 71 IN | BODY MASS INDEX: 37.38 KG/M2 | SYSTOLIC BLOOD PRESSURE: 126 MMHG | WEIGHT: 267 LBS

## 2025-07-08 DIAGNOSIS — R60.0 BILATERAL LEG EDEMA: Primary | ICD-10-CM

## 2025-07-08 DIAGNOSIS — I10 PRIMARY HYPERTENSION: ICD-10-CM

## 2025-07-08 DIAGNOSIS — E66.01 OBESITY, MORBID (MULTI): ICD-10-CM

## 2025-07-08 DIAGNOSIS — G47.19 EXCESSIVE DAYTIME SLEEPINESS: ICD-10-CM

## 2025-07-08 PROBLEM — F10.231 ALCOHOL DEPENDENCE WITH WITHDRAWAL DELIRIUM (MULTI): Status: RESOLVED | Noted: 2023-09-03 | Resolved: 2025-07-08

## 2025-07-08 PROBLEM — C43.9 MALIGNANT MELANOMA OF SKIN: Status: RESOLVED | Noted: 2021-08-06 | Resolved: 2025-07-08

## 2025-07-08 PROBLEM — F33.2 MAJOR DEPRESSIVE DISORDER, RECURRENT SEVERE WITHOUT PSYCHOTIC FEATURES (MULTI): Status: RESOLVED | Noted: 2023-09-03 | Resolved: 2025-07-08

## 2025-07-08 PROBLEM — D03.59 MELANOMA IN SITU OF TORSO EXCLUDING BREAST: Status: RESOLVED | Noted: 2020-09-29 | Resolved: 2025-07-08

## 2025-07-08 PROCEDURE — 99213 OFFICE O/P EST LOW 20 MIN: CPT | Performed by: FAMILY MEDICINE

## 2025-07-08 PROCEDURE — 3078F DIAST BP <80 MM HG: CPT | Performed by: FAMILY MEDICINE

## 2025-07-08 PROCEDURE — 3074F SYST BP LT 130 MM HG: CPT | Performed by: FAMILY MEDICINE

## 2025-07-08 PROCEDURE — 1125F AMNT PAIN NOTED PAIN PRSNT: CPT | Performed by: FAMILY MEDICINE

## 2025-07-08 PROCEDURE — G2211 COMPLEX E/M VISIT ADD ON: HCPCS | Performed by: FAMILY MEDICINE

## 2025-07-08 PROCEDURE — 1036F TOBACCO NON-USER: CPT | Performed by: FAMILY MEDICINE

## 2025-07-08 PROCEDURE — 1159F MED LIST DOCD IN RCRD: CPT | Performed by: FAMILY MEDICINE

## 2025-07-08 PROCEDURE — 3008F BODY MASS INDEX DOCD: CPT | Performed by: FAMILY MEDICINE

## 2025-07-08 RX ORDER — LOSARTAN POTASSIUM 100 MG/1
100 TABLET ORAL DAILY
Qty: 90 TABLET | Refills: 3 | Status: SHIPPED | OUTPATIENT
Start: 2025-07-08 | End: 2026-07-08

## 2025-07-08 RX ORDER — CHLORTHALIDONE 25 MG/1
25 TABLET ORAL DAILY
Qty: 90 TABLET | Refills: 3 | Status: SHIPPED | OUTPATIENT
Start: 2025-07-08 | End: 2026-07-08

## 2025-07-08 ASSESSMENT — PAIN SCALES - GENERAL: PAINLEVEL_OUTOF10: 7

## 2025-07-08 NOTE — PROGRESS NOTES
74-year presents for follow-up    1. Bilateral leg edema   Still present but improving after discontinuation of amlodipine and initiating chlorthalidone.  Saw cardiology likely chronic venous stasis.  Has difficulty with putting compression socks on   2. Primary hypertension   Well-controlled on current therapy 126/58 today   3. Excessive daytime sleepiness   Never got sleep study has no desire to     All pertinent positive symptoms are included in history of present illness.    All other systems have been reviewed and are negative and noncontributory to this patient's current ailments.      CONSTITUTIONAL - INAD. Not ill appearing.  SKIN - No lesions or rashes visualized. No jaundice visualized.  HEENT- Atraumatic, normocephalic, no scleral icterus, external nares are not erythematous and without drainage, no neck masses visualized, oropharynx visualized and is without erythema or exudate  RESP - respiration not labored   CARDIAC - no grade 6 systolic murmurs auscultated  ABDOMEN - nondistended.  NEURO- CNs II-XII grossly intact        1. Bilateral leg edema (Primary)  Continue chlorthalidone can try zippered compression stockings.  Follow-up with vascular as discussed  - chlorthalidone (Hygroton) 25 mg tablet; Take 1 tablet (25 mg) by mouth once daily.  Dispense: 90 tablet; Refill: 3    2. Primary hypertension  Well-controlled  - chlorthalidone (Hygroton) 25 mg tablet; Take 1 tablet (25 mg) by mouth once daily.  Dispense: 90 tablet; Refill: 3  - losartan (Cozaar) 100 mg tablet; Take 1 tablet (100 mg) by mouth once daily.  Dispense: 90 tablet; Refill: 3  - Comprehensive metabolic panel; Future  - Comprehensive metabolic panel    3. Excessive daytime sleepiness  Can still get sleep study difficulty with excessive daytime sleepiness

## 2025-07-14 NOTE — PROGRESS NOTES
Verbal consent of the patient and/or verbal parental consent for patients under the age of 18 have been obtained to conduct a physical examination at this office visit.    The  Srinivasa ALVARADO was present in the room during the entire visit including, but not limited to the physical examination.    Established patient    History Of Present Illness  07/15/25 Tito Garcia is a 74 y.o. male who presents for Supartz #1 injection into their BILATERAL knees. States that he feels 6/10 pain today, right knee worse than left. States that the last week he tried walking the dog and about half way through felt like his right knee was going to give out on him. He continues to do stationary bike and feels that is going well. States that he will be seeing Dr. Raphael for surgical consult on TKR for the right knee in about 4-6 weeks, however when he most recently spoke to him there was not a sense of urgency to fast track a procedure and thus he would like to start supartz injections in both knees today to help him get by until he is able to have the knee replacement.     All previous Progress Notes and imaging results related to this patients chief complaint have been reviewed in preparation for this examination.    Past Medical History  He has a past medical history of A-fib (Multi), Arrhythmia, Arthritis, Headache, Hyperlipidemia, Hypertension, and Other bursal cyst, other site.    He has no past medical history of Cataract.    Surgical History  He has a past surgical history that includes Other surgical history (11/10/2022); Other surgical history (11/10/2022); Other surgical history (11/10/2022); MR angio head wo IV contrast (8/1/2021); Back surgery (2010); Circumcision, primary (1950); Eye surgery (3/2024); Hernia repair (1955); Prostate surgery (2020); Tonsillectomy (?); and Jaffrey tooth extraction (?).     Social History  He reports that he has quit smoking. His smoking use included cigarettes. He has a 15 pack-year  smoking history. He has been exposed to tobacco smoke. He has never used smokeless tobacco. He reports that he does not currently use alcohol. He reports that he does not use drugs.    Family History  Family History[1]     Allergies  Patient has no known allergies.    Historical Clinical Intake  06/30/25 Tito Garcia is a 74 y.o. male who presents for an evaluation of their BILATERAL legs. States that his primary complaint are his knees. He has history of severe tricompartmental arthritis. He was scheduled to have both knees replaced a few years ago by Dr. Raphael however due to heart issues and a DVT at the time he has not been able to do so. States that the left knee is typically worse than the right. 7/10 pain today. States that his pain wraps around but is primarily along the medial joint lines. He has had cortisone injections in his knees in the past and is unsure if he has had viscosupplementation injections. He continues to do cardio exercises for 30 minutes daily by walking and stationary bike and elliptical. He is expecting to have his right knee replaced in October.  He says additionally it has been tough because he has been taking care of his wife who has severe Alzheimer's/dementia that is progressed significantly over the past 2 years enough so that she is not able to take care of herself at times that he has to do so.  He is hoping he can get into some aquatic and physical therapy to get his strength out so he could continue to function to take care of his wife as well as himself.  He also wants to try to continue to lose some weight so if he is able to get his knee replaced he will have better results.  He also says he needs updated imaging for the surgery and also wondering if there is anything he can do for his other knee that he is not getting surgery on to get by.   -----------------------------------------------------------------  07/03/25 Tito Garcia is a 74 y.o. male who presents for MRI review  of their BILATERAL knees. States that he continues to have 7/10 pain. States that he has been resting since he was last seen and feels slight relief. No new or worsening symptoms since he was last seen. States that he feels ready to have his right knee replaced at this time and will be reaching back out to Dr. Raphael.  Apparently according to Dr. Raphael the reason he canceled his surgeries before was because of swelling in his lower legs that he is having few be evaluated for apparently coming up in the near future.  He tentatively is scheduled for a knee replacement in October.      Review of Systems  CONSTITUTIONAL:   Negative for weight change, loss of appetite, fatigue, weakness, fever, chills, night sweats, headaches .           HEENT:   Negative for cold, cough, sore throat, sinus pain, swollen lymph nodes.           OPHTHALMOLOGY:   Negative for diminished vision, blurred vision, loss of vision, double vision.           ALLERGY:   Negative for runny nose, scratchy throat, sinus congestion, rash, facial pressure, nasal congestion, post-nasal drip.           CARDIOLOGY:   Negative for chest pain, palpitations, murmurs, irregular heart beat, shortness of breath, leg edema, dyspnea on exertion, fatigue, dizziness.           RESPIRATORY:   Negative for chest pain, shortness of breath, swelling of the legs, asthma/copd, chest congestion, pain with breathing .           GASTROENTEROLOGY:   Negative for nausea, vomitting, heartburn, constipation, diarrhea, blood in stool, change in bowel habits, black stool.           HEMATOLOGY/LYMPH:   Negative for fatigue, loss of appetitie, easy bruising, easy bleeding, anemia, abnormal bleeding, slow healing.           ENDOCRINOLOGY:   Negative for polyuria, polydipsia, polyphagia, fatigue, weight loss, weight gain, cold intolerance, heat intolerance, diabetes.           MUSCULOSKELETAL:   Positive  for BILATERAL knee pain        DERMATOLOGY:   Negative for rash, bruising.            NEUROLOGY:   Negative for tingling, numbness, gait abnormality, paresthesias, weakness, sciatica.        Examination: All findings no change since last week  BILATERAL Medial Joint Line over the Medial Meniscus  Edema: Positive  Diffusely.   Effusion: Negative.   Percussion Test:  Negative.   Tuning Fork Test:  Negative.   Ecchymosis/Bruising: Negative.            Palpation: All findings no change since last week  Positive  BILATERAL  Medial Joint Line over the Medial Meniscus    Orientation: All findings no change since last weekk  Positive Asymmetrical: because of the swelling.     Range of Motion: All findings no change since last week  Positive Knee Flexion (RIGHT 88 degrees, LEFT 90 degrees) Decreased because of pain and swelling  Positive Knee Extension (RIGHT 164 degrees, LEFT 160 degrees) Decreased because of pain and swelling           Muscle Strength:  All findings no change since last week  Positive +4/+5 Quadricep Extension Causes pain  Positive +4/+5 Hamstring Flexion Causes pain  +5+5 Gastrocnemius  +5+5 Soleus.            Proprioception:All findings no change since last week  Pain with Squat: Positive    Pain with Sumo Squat:  Positive   Hop Test: Positive    Single leg balance test Positive            Vascular:   +2/+4 Femoral  +2/+4 Dorsalis Pedis  +2/+4 Posterior Tibial  Capillary Refill less than 2 seconds.                Knee - ACL:  Anterior Drawer Test: Negative   Lachman Test: Negative    Lelli Test: Negative                    KNEE - PCL/POSTERIOR LATERAL CORNER:  Posterior Drawer Test: Negative  Reverse Lachman Test: Negative     KNEE - POPLITEUS:All findings no change since last week  Cyrus's Test: Positive     KNEE - MCL / LCL:  Valgus Stress Test: 90 degrees: [id number] degrees:Negative   Varus Stress Test:  90 degrees: Negative, 20-30 degrees: Negative.   Apley Distraction Test: Negative  Thessaly Test: Unable to perform       KNEE - MENISCUS:All findings no change since last  week  Apley Compression Test:  Positive       Stienmann Test:  Positive   Shasha Test:  Positive     Bounce Home Test:  Positive  Thessaly Test: Unable to perform    KNEE - PATELLA:All findings no change since last week  Apprehension Test:  Positive  Glide Test:  Positive  Grind Test: Positive  Patella Tracking Test: Positive  Fat pad Impingement: Positive             KNEE - QUADRICEPS: All findings no change since last week       VMO Test: Positive   VLO Test:  Negative    Hip/Pelvis - Sacrum:  Leg Length Supine: Positive    Leg Length Supine to Seated (Derbolowsky Sign): Positive     Hip Flexor Tightness: Positive   Hamstring Tightness: Positive           Feet/Foot: Positive BILATERAL Valgus foot         Imaging and Diagnostics Review:  MR knee right wo IV contrast  Order date: 7/1/2025  Interpreted By:  Butch Luis,   STUDY:  MR KNEE RIGHT WO IV CONTRAST; ;  7/1/2025 9:58 am      FINDINGS:  Anterior cruciate ligament demonstrates diffuse mucoid degeneration  with osteophytosis along inner margin of the lateral femoral condyle  displaces the ACL.      Posterior cruciate ligament is intact. There is intermediate signal  intensity in the distal PCL about the tibial attachment as seen on  prior imaging with sequela of chronic low-grade sprain.      Lateral meniscus demonstrates tear along the free edge of the  posterior horn of the lateral meniscus near the root ligament  insertion.      Medial meniscus demonstrates macerated posterior horn of the medial  meniscus in particular near the posterior body segment. There is  blunted diminutive posterior horn with irregular appearing tear  within the remainder of the posterior horn. Body of the medial  meniscus demonstrates peripheral extrusion and blunted appearance  with tear along the posterior body segment.      Medial collateral ligament complex is bowed in relation to medial  femorotibial osteoarthritis.      Lateral supporting structures are intact. Mild  chronic thickened  proximal fibular collateral ligament.      Extensor mechanism is intact. There is a moderate infrapatellar  subcutaneous edema. Patellofemoral articulation demonstrates severe  loss of articular cartilage throughout the lateral facet/apex of the  patella as well as corresponding lateral trochlea with moderate  thinning throughout the remainder of the trochlea.      Lateral femorotibial compartment demonstrates mild thinning of the  cartilage of the outer margin of the compartment. There is severe  medial femorotibial osteoarthritis with denuded articular cartilage  and subchondral reactive edema and osteophytosis demonstrated.      Small knee joint effusion. Subtle popliteal cyst demonstrated.      IMPRESSION right knee MRI July 1, 2025:  1. 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  2. Macerated posterior horn of the medial meniscus at the posterior  body segment with irregular appearing tear within the remainder of  the posterior horn.  3. Diffuse mucoid degeneration of the ACL which is displaced by  osteophytosis inner margin of the lateral femoral condyle.  4. Lateral meniscus tear free edge posterior horn  5. PCL sprain chronic  6. MCL bowing  7. LCL thickening  8. Subtle popliteal Cyst  9. Small joint effusion    Signed by: Butch Luis 7/1/2025 10:41 AM  Dictation workstation:   [id number]     --------------------------------------------------------------------  MR knee left wo IV contrast  Order date: 7/1/2025  Interpreted By:  Butch Luis,   STUDY:  MR KNEE LEFT WO IV CONTRAST; ;  7/1/2025 9:57 am      FINDINGS:  Anterior cruciate ligament is intact. Mild mucoid degeneration of the  ACL. Osteophytosis inner margin of the lateral femoral condyle abuts  the ACL. There is pericruciate ganglion cyst formation about the  femoral attachment of the ACL measuring 6 mm.      Posterior cruciate ligament is intact.      Lateral  meniscus demonstrates mild degeneration of the posterior  horn. No discrete longitudinal tear. Blunted free edge demonstrated.      Medial meniscus demonstrates diminutive posterior horn of the medial  meniscus from the mid posterior horn towards the root ligament  insertion with resultant displaced meniscal flap identified centrally  in the medial femorotibial compartment with meniscal flap identified  measuring 1.8 cm (series 10, image 21 and series 8, image 25).  Alternatively, this may reflect discoid meniscus, but there is a  irregularity of the free edge of the body. Findings are stable from  the remote MRI.      Medial collateral ligament complex is bowed in relation to medial  femorotibial osteoarthritis.      Lateral supporting structures are intact. Moderate popliteus  tendinosis demonstrated      Extensor mechanism is intact. There is mild distal quadriceps  tendinosis. Mild prepatellar and infrapatellar subcutaneous edema.  Patellofemoral articulation demonstrates severe loss of articular  cartilage throughout the lateral facet and apex of the patella mild  subchondral cystic change demonstrated. Also, corresponding severe  thinning of the articular cartilage within the lateral trochlea with  remodeling of the subchondral bone plate and subchondral reactive  edema. Osteophytosis demonstrated along the outer margin of the  medial and lateral trochlear ridge.      Lateral femorotibial compartment demonstrates mild chondromalacia.  There is a mild thinning of the cartilage along the outer margin of  the compartment. Medial femorotibial compartment demonstrates severe  loss of articular cartilage central to outer margin weight-bearing  portion of the compartment with subchondral reactive edema and cystic  change demonstrated. Mild osteophytosis      No knee joint effusion. There is a 5 mm body in the small popliteal  cyst. Musculature about the knee demonstrates intermediate signal  intensity and strain of  the plantaris and portions of the lateral  head of the gastrocnemius at the origin.      IMPRESSION left knee MRI July 1, 2025 :  1. Stable appearing medial meniscus with diminutive posterior horn  from near the mid posterior horn towards the root ligament insertion.  Of note, there is a equivocal displaced meniscal flap centrally in  the intercondylar notch as opposed to discoid medial meniscus with  findings stable from prior imaging.  2. Tricompartmental osteoarthritis with severe patellofemoral and  medial femorotibial osteoarthritis.  3. Mild muscle strain of the lateral head gastrocnemius and plantaris  musculature.  4. Lateral meniscus degeneration  5. ACL degeneration with pericruciate ganglion cyst  6. MCL Bowing  7. Popliteal tendinosis      Signed by: Butch Luis 7/1/2025 10:33 AM  Dictation workstation:   [id number]     ---------------------------------------------------------------------   MR Left knee 10/04/2021    IMPRESSION MRI of the left knee October 4, 2021:  1. Diminutive posterior horn the medial meniscus about the root ligament  insertion in relation to displaced meniscal flap arising from the root  ligament insertion extending centrally into the intercondylar notch with  flap measuring up to 2.1 x 1.5 cm in AP and transverse dimension  respectively.  2. Severe patellofemoral and moderate to severe medial femorotibial  osteoarthritis.   ----------------------------------------------------------------------  MR Right knee November 18, 2021  IMPRESSION MRI of the right knee :  1. Macerated appearance of the body segment and posterior horn medial  meniscus.  2. Severe patellofemoral and medial tibiofemoral osteoarthrosis with areas  of complete chondral loss.  3. MCL bursitis.  4. Large joint effusion and chronic synovitis.  5. Complex Baker's cyst.  6. 2.1 x 1.4 x 1.1 cm loose body within the tibiofemoral joint space  posteriorly.  7. Severe mucoid degeneration versus chronic partial  tearing of the ACL.       Assessment   1. Primary osteoarthritis of left knee        2. Primary osteoarthritis of right knee        3. Degenerative tear of posterior horn of medial meniscus of right knee        4. Cyst, baker's knee, right        5. 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 Patellar bursitis of left knee        7. Loose body in knee, right knee        8. Derangement of unspecified medial meniscus due to old tear or injury, left knee        9. Derangement of other medial meniscus due to old tear or injury, right knee        10. Derangement of posterior horn of medial meniscus due to old tear or injury, left knee        11. Degenerative tear of posterior horn of lateral meniscus of right knee        12. Baker's cyst of knee, right        13. Rupture of anterior cruciate ligament of right knee, subsequent encounter            L Inj/Asp: bilateral knee on 7/15/2025 9:47 AM  Indications: pain and diagnostic evaluation  Details: 22 G needle, ultrasound-guided  Medications (Right): 2 mL sodium hyaluronate 10 mg/mL  Medications (Left): 2 mL sodium hyaluronate 10 mg/mL  Outcome: tolerated well, no immediate complications  Procedure, treatment alternatives, risks and benefits explained, specific risks discussed. Consent was given by the patient. Immediately prior to procedure a time out was called to verify the correct patient, procedure, equipment, support staff and site/side marked as required.            Procedure   Time Out (5 Minutes): Yes  Patient Name: Tito Garcia  YOB: 1950  Procedure: Supartz Injection 1/5  Needed Supplies Available: Correct Supplies  Laterality: BILATERAL knee  Site Verified and Marked: Correct Site(s) Marked  Timeout Performed by Provider: Dr. Albino Chang, D.O.  Staff Initials: [facility]    Patient is informed and consent has been signed by the patient if over 18 years old., Patient parent and/or legal guardian is informed and consent has been signed., Patient  and/or parent and/or legal guardian accept all risks, benefits, and possible complications associated with procedure(s) and/or manipulation(s) and/or injection(s)., Patient and/or parent and/or legal guardian deny patient allergy or known complications with any of the medications, instruments, products, and/or techniques used., All questions and concerns were answered and/or addressed with the patient and/or parent and/or legal guardian., The patient and/or parent and/or legal guardian agree to proceed with the procedure(s) and/or manipulation(s) and/or injection(s)., Prep: The area was cleansed with a mixture of equal parts rubbing alcohol 70% and Hibclens., Utilizing clean technique, the injection site(s) were marked with a skin marker., and Injection site(s) were anesthestized with topical analgesic spray prior to injection.    Performed regenerative Supartz Injection 1/5 into the patients BILATERAL knees, under ultrasound.  Supartz FX (25mg/2.5ml)     Band-aid and/or compressive bandage was placed over the injection site(s). After the injection(s) performed osteopathic manipulation therapy to the affected area(s) to help increase blood flow to aid with the healing process as well as circulation of the medication. The patient tolerated the procedure well without any complications. The patient was instructed to gently massage the treatment site(s) as well as to apply moist heat to the affected area(s). Additionally, the patient was instructed to contact the office immediately with any complications or concerns.    The ATC Srinivasa were present in the room during the entire procedure.    The following discharge instructions were reviewed in detail with the patient to the level of their understanding:    PAIN:  A mild to moderate amount of discomfort, tenderness, stiffness, and achiness-caused by the inflammation of the area can be expected for a few days after the injection. This is normal and good as the inflammation  is an important part of the healing process.    SKIN: Due to the numbing spray used, you may develop a red, itchy, scaly rash in the area that was sprayed. Should your skin become itchy, wash the area with mild soap and warm water. If needed, you may apply topical over-the-counter Hydrocortisone cream to alleviate any itchiness. AVOID scratching due to risk of infection.,     BATHING/SWIMMING: You may shower after your procedure with regular soap and water, but no baths, no swimming, and no hot tubs for 3-4 days after the procedure.,     ACTIVITIES:  Immediately following the injection, you may resume daily activities (such as work) and light exercise. We suggest that you refrain from strenuous activity and heavy lifting, particularly the injured body part, for a week post-procedure, but sometimes this can change as well.    MOIST HEAT/ICE:  Moist heat may be used on the injection area. NO ICE.  MEDICATION: You may continue your prescribed medications and any over-the-counter supplements; however, it is not recommended to use aspirin or anti-inflammatories (Motrin, Advil, Aleve, Ibuprofen, Naproxen etc.) for up to 2 weeks post procedure. Tylenol Extra Strength, Tylenol Arthritis and/or any prescribed narcotics or muscle relaxants are OK to take.    APPOINTMENTS: You should have a follow-up appointment with Dr. Chang scheduled 1-3 weeks as recommended after your procedure for your next round of injections or to follow-up after you have completed your injection series. Please schedule your follow-up appointment on your way out after your procedure, or call the office to schedule these appointments as soon as possible.    PRECAUTIONS: Smoking, caffeine, alcohol, sex and anti-inflammatories post-procedure may reduce the effectiveness of Prolotherapy/Neural Prolotherapy/Prolozone injections. Early, rare post procedure problems that can be concerning are as follows: an increase in pain not relieved by medication (over  the counter or prescription), a temperature above 101 degrees, bleeding or progressive, extreme swelling, or numbness.     CALL THE OFFICE OR GO TO THE EMERGENCY ROOM IF ANY OF THESE SYMPTOMS OCCUR.   If you have any questions or problems, please call our office at [phone]       Treatment or Intervention:  May continue to alternate ice and moist heat as needed  Reviewed degenerative meniscus tears, degenerative joint disease of the knees, patellofemoral tracking syndrome, knee instability, and/or patellar tendinitis in detail with the patient to the level of their understanding; a copy of this handout was provided to the patient at the time of this office visit.  Make sure to start into aquatic and physical Therapy 1-2 times a week for 8-10 weeks with manual therapy as well as dry needling and IASTM  Once again reviewed home exercises to be performed by the patient routinely  Once again stressed the importance of wearing shoes with good stability control to help with the biomechanics affecting the knees and lower extremities  Once again stressed the importance of wearing full foot insoles to help with the biomechanics affecting the knees and lower extremities  Recommendation to continue over-the-counter calcium with vitamin-D 2 -3000+ milligrams a day, as well as  a daily multivitamin.   Recommendation to continue over-the-counter curcumin, turmeric, boswellia, as well as egg shell membrane as directed to aid with joint inflammation.  Recommendation to continue over-the-counter Move Free for joint health.  Patient advised regarding the risks and/or potential adverse reactions and/or side effects of any prescribed medications along with any over-the-counter medications or any supplements used. Patient advised to seek immediate medical care if any adverse reactions occur. The patient and/or patient(s) parent(s) verbalized their understanding  Reviewed bilateral knee MRI in detail with the patient and/or patients  parent/legal guardian to their level of understanding; a copy of these results were provided to the patient and/or patients parent/legal guardian at the time of this office visit.  Discussed in detail with the patient to the level of their understanding the possibility in the future of regenerative injections versus corticosteroid injections versus viscosupplementation injections versus a combination if decide against surgical intervention or with what ever knee he decides not to get surgery on  After patient's knee replacement we will get back into the office to check for leg length discrepancy, will provide appropriate __ millimeter heel lift to be placed in the RIGHT/LEFT shoe to accommodate for leg length discrepancy found on standing erect pelvis xray  Make sure to keep follow-up appointments with Dr. Raphael and I also told him because of Dr. Raphael schedule he needs to get back in touch with them to tell him what knee he needs to get done and to get in for reevaluation to make sure that his surgery is scheduled for October which per Dr. Raphael it seems like this is true however he seems to have appointments lined up for him for lower leg evaluations because of swelling additionally sometime in August  Performed Supartz injection #1 into the patients bilateral knees under ultrasound. The patient tolerated the procedure well and without any adverse effects. Discharge instructions for VISCOSUPPLEMENTATION injections were reviewed in detail with the patient to the level of their understanding; a copy of these instructions were provided to the patient at the time of this office visit.  Follow-up         JASON CHANG on 7/15/25 at 12:15 PM.     Please note that this report has been produced using speech recognition software.  It may contain errors related to grammar, punctuation or spelling.  Electronically signed, but not reviewed.      Jason Chang D.O. FAOASM     Injected bilateral knees with Supartz         [1]    Family History  Problem Relation Name Age of Onset    Hypertension Mother Digna Garcia     Other (Brain Tumor) Mother Digna Garcia     Diabetes Mother Digna Garcia     Other (Bladder Cancer) Father      Other (cyst on spine) Sister      Melanoma Brother

## 2025-07-15 ENCOUNTER — HOSPITAL ENCOUNTER (OUTPATIENT)
Dept: RADIOLOGY | Facility: EXTERNAL LOCATION | Age: 75
Discharge: HOME | End: 2025-07-15

## 2025-07-15 ENCOUNTER — OFFICE VISIT (OUTPATIENT)
Dept: ORTHOPEDIC SURGERY | Facility: CLINIC | Age: 75
End: 2025-07-15
Payer: MEDICARE

## 2025-07-15 VITALS
WEIGHT: 267 LBS | SYSTOLIC BLOOD PRESSURE: 126 MMHG | BODY MASS INDEX: 37.38 KG/M2 | HEIGHT: 71 IN | HEART RATE: 68 BPM | DIASTOLIC BLOOD PRESSURE: 58 MMHG

## 2025-07-15 DIAGNOSIS — M23.41 LOOSE BODY IN KNEE, RIGHT KNEE: ICD-10-CM

## 2025-07-15 DIAGNOSIS — M71.21 BAKER'S CYST OF KNEE, RIGHT: ICD-10-CM

## 2025-07-15 DIAGNOSIS — M65.90: ICD-10-CM

## 2025-07-15 DIAGNOSIS — S83.511D RUPTURE OF ANTERIOR CRUCIATE LIGAMENT OF RIGHT KNEE, SUBSEQUENT ENCOUNTER: ICD-10-CM

## 2025-07-15 DIAGNOSIS — M25.00: ICD-10-CM

## 2025-07-15 DIAGNOSIS — M23.204 DERANGEMENT OF UNSPECIFIED MEDIAL MENISCUS DUE TO OLD TEAR OR INJURY, LEFT KNEE: ICD-10-CM

## 2025-07-15 DIAGNOSIS — M70.52 PATELLAR BURSITIS OF LEFT KNEE: ICD-10-CM

## 2025-07-15 DIAGNOSIS — M23.321 DEGENERATIVE TEAR OF POSTERIOR HORN OF MEDIAL MENISCUS OF RIGHT KNEE: ICD-10-CM

## 2025-07-15 DIAGNOSIS — M71.21: ICD-10-CM

## 2025-07-15 DIAGNOSIS — M23.351 DEGENERATIVE TEAR OF POSTERIOR HORN OF LATERAL MENISCUS OF RIGHT KNEE: ICD-10-CM

## 2025-07-15 DIAGNOSIS — M23.222 DERANGEMENT OF POSTERIOR HORN OF MEDIAL MENISCUS DUE TO OLD TEAR OR INJURY, LEFT KNEE: ICD-10-CM

## 2025-07-15 DIAGNOSIS — M17.11 PRIMARY OSTEOARTHRITIS OF RIGHT KNEE: ICD-10-CM

## 2025-07-15 DIAGNOSIS — M23.231 DERANGEMENT OF OTHER MEDIAL MENISCUS DUE TO OLD TEAR OR INJURY, RIGHT KNEE: ICD-10-CM

## 2025-07-15 DIAGNOSIS — M17.12 PRIMARY OSTEOARTHRITIS OF LEFT KNEE: ICD-10-CM

## 2025-07-15 LAB
ALBUMIN SERPL-MCNC: 4.5 G/DL (ref 3.6–5.1)
ALP SERPL-CCNC: 65 U/L (ref 35–144)
ALT SERPL-CCNC: 19 U/L (ref 9–46)
ANION GAP SERPL CALCULATED.4IONS-SCNC: 12 MMOL/L (CALC) (ref 7–17)
AST SERPL-CCNC: 29 U/L (ref 10–35)
BILIRUB SERPL-MCNC: 0.8 MG/DL (ref 0.2–1.2)
BUN SERPL-MCNC: 14 MG/DL (ref 7–25)
CALCIUM SERPL-MCNC: 9.4 MG/DL (ref 8.6–10.3)
CHLORIDE SERPL-SCNC: 96 MMOL/L (ref 98–110)
CO2 SERPL-SCNC: 27 MMOL/L (ref 20–32)
CREAT SERPL-MCNC: 1.13 MG/DL (ref 0.7–1.28)
EGFRCR SERPLBLD CKD-EPI 2021: 68 ML/MIN/1.73M2
GLUCOSE SERPL-MCNC: 96 MG/DL (ref 65–139)
POTASSIUM SERPL-SCNC: 3.9 MMOL/L (ref 3.5–5.3)
PROT SERPL-MCNC: 7.5 G/DL (ref 6.1–8.1)
SODIUM SERPL-SCNC: 135 MMOL/L (ref 135–146)

## 2025-07-15 PROCEDURE — 2500000004 HC RX 250 GENERAL PHARMACY W/ HCPCS (ALT 636 FOR OP/ED): Performed by: FAMILY MEDICINE

## 2025-07-15 PROCEDURE — 3008F BODY MASS INDEX DOCD: CPT | Performed by: FAMILY MEDICINE

## 2025-07-15 PROCEDURE — 20611 DRAIN/INJ JOINT/BURSA W/US: CPT | Mod: 50 | Performed by: FAMILY MEDICINE

## 2025-07-15 PROCEDURE — 1159F MED LIST DOCD IN RCRD: CPT | Performed by: FAMILY MEDICINE

## 2025-07-15 PROCEDURE — 1125F AMNT PAIN NOTED PAIN PRSNT: CPT | Performed by: FAMILY MEDICINE

## 2025-07-15 PROCEDURE — 3078F DIAST BP <80 MM HG: CPT | Performed by: FAMILY MEDICINE

## 2025-07-15 PROCEDURE — 3074F SYST BP LT 130 MM HG: CPT | Performed by: FAMILY MEDICINE

## 2025-07-15 PROCEDURE — 1036F TOBACCO NON-USER: CPT | Performed by: FAMILY MEDICINE

## 2025-07-15 PROCEDURE — 99214 OFFICE O/P EST MOD 30 MIN: CPT | Performed by: FAMILY MEDICINE

## 2025-07-15 RX ADMIN — Medication 2 ML: at 09:47

## 2025-07-15 ASSESSMENT — ENCOUNTER SYMPTOMS
DEPRESSION: 0
OCCASIONAL FEELINGS OF UNSTEADINESS: 0
LOSS OF SENSATION IN FEET: 0

## 2025-07-15 ASSESSMENT — PAIN SCALES - GENERAL
PAINLEVEL_OUTOF10: 7
PAINLEVEL_OUTOF10: 7

## 2025-07-15 ASSESSMENT — PAIN - FUNCTIONAL ASSESSMENT: PAIN_FUNCTIONAL_ASSESSMENT: 0-10

## 2025-07-15 ASSESSMENT — PAIN DESCRIPTION - DESCRIPTORS: DESCRIPTORS: ACHING;SORE

## 2025-07-22 ENCOUNTER — HOSPITAL ENCOUNTER (OUTPATIENT)
Dept: RADIOLOGY | Facility: EXTERNAL LOCATION | Age: 75
Discharge: HOME | End: 2025-07-22

## 2025-07-22 ENCOUNTER — OFFICE VISIT (OUTPATIENT)
Dept: ORTHOPEDIC SURGERY | Facility: CLINIC | Age: 75
End: 2025-07-22
Payer: MEDICARE

## 2025-07-22 VITALS
SYSTOLIC BLOOD PRESSURE: 132 MMHG | WEIGHT: 265 LBS | BODY MASS INDEX: 37.1 KG/M2 | HEART RATE: 63 BPM | HEIGHT: 71 IN | DIASTOLIC BLOOD PRESSURE: 74 MMHG

## 2025-07-22 DIAGNOSIS — M23.321 DEGENERATIVE TEAR OF POSTERIOR HORN OF MEDIAL MENISCUS OF RIGHT KNEE: ICD-10-CM

## 2025-07-22 DIAGNOSIS — M17.12 PRIMARY OSTEOARTHRITIS OF LEFT KNEE: ICD-10-CM

## 2025-07-22 DIAGNOSIS — M71.21 BAKER'S CYST OF KNEE, RIGHT: ICD-10-CM

## 2025-07-22 DIAGNOSIS — M70.52 PATELLAR BURSITIS OF LEFT KNEE: ICD-10-CM

## 2025-07-22 DIAGNOSIS — M23.41 LOOSE BODY IN KNEE, RIGHT KNEE: ICD-10-CM

## 2025-07-22 DIAGNOSIS — S83.511D RUPTURE OF ANTERIOR CRUCIATE LIGAMENT OF RIGHT KNEE, SUBSEQUENT ENCOUNTER: ICD-10-CM

## 2025-07-22 DIAGNOSIS — M23.222 DERANGEMENT OF POSTERIOR HORN OF MEDIAL MENISCUS DUE TO OLD TEAR OR INJURY, LEFT KNEE: ICD-10-CM

## 2025-07-22 DIAGNOSIS — M23.351 DEGENERATIVE TEAR OF POSTERIOR HORN OF LATERAL MENISCUS OF RIGHT KNEE: ICD-10-CM

## 2025-07-22 DIAGNOSIS — M23.204 DERANGEMENT OF UNSPECIFIED MEDIAL MENISCUS DUE TO OLD TEAR OR INJURY, LEFT KNEE: ICD-10-CM

## 2025-07-22 DIAGNOSIS — M23.231 DERANGEMENT OF OTHER MEDIAL MENISCUS DUE TO OLD TEAR OR INJURY, RIGHT KNEE: ICD-10-CM

## 2025-07-22 DIAGNOSIS — M17.11 PRIMARY OSTEOARTHRITIS OF RIGHT KNEE: ICD-10-CM

## 2025-07-22 DIAGNOSIS — M71.21: ICD-10-CM

## 2025-07-22 PROCEDURE — 99214 OFFICE O/P EST MOD 30 MIN: CPT | Performed by: FAMILY MEDICINE

## 2025-07-22 PROCEDURE — 1125F AMNT PAIN NOTED PAIN PRSNT: CPT | Performed by: FAMILY MEDICINE

## 2025-07-22 PROCEDURE — 2500000004 HC RX 250 GENERAL PHARMACY W/ HCPCS (ALT 636 FOR OP/ED): Performed by: FAMILY MEDICINE

## 2025-07-22 PROCEDURE — 3078F DIAST BP <80 MM HG: CPT | Performed by: FAMILY MEDICINE

## 2025-07-22 PROCEDURE — 3008F BODY MASS INDEX DOCD: CPT | Performed by: FAMILY MEDICINE

## 2025-07-22 PROCEDURE — 3075F SYST BP GE 130 - 139MM HG: CPT | Performed by: FAMILY MEDICINE

## 2025-07-22 PROCEDURE — 20611 DRAIN/INJ JOINT/BURSA W/US: CPT | Mod: 50 | Performed by: FAMILY MEDICINE

## 2025-07-22 PROCEDURE — 1159F MED LIST DOCD IN RCRD: CPT | Performed by: FAMILY MEDICINE

## 2025-07-22 RX ADMIN — SODIUM HYALURONATE INTRA-ARTICULAR SOLN PREF SYR 25 MG/2.5ML 25 MG: 25/2.5 SOLUTION PREFILLED SYRINGE at 10:43

## 2025-07-22 ASSESSMENT — ENCOUNTER SYMPTOMS
OCCASIONAL FEELINGS OF UNSTEADINESS: 1
LOSS OF SENSATION IN FEET: 0
DEPRESSION: 0

## 2025-07-22 ASSESSMENT — PAIN SCALES - GENERAL
PAINLEVEL_OUTOF10: 3
PAINLEVEL_OUTOF10: 3

## 2025-07-22 ASSESSMENT — PAIN DESCRIPTION - DESCRIPTORS: DESCRIPTORS: ACHING;SORE;STABBING;SHARP

## 2025-07-22 ASSESSMENT — PAIN - FUNCTIONAL ASSESSMENT: PAIN_FUNCTIONAL_ASSESSMENT: 0-10

## 2025-07-22 NOTE — PROGRESS NOTES
Verbal consent of the patient and/or verbal parental consent for patients under the age of 18 have been obtained to conduct a physical examination at this office visit.    The nurse Destinee was present in the room during the entire visit including, but not limited to the physical examination.    Established patient    History Of Present Illness  07/22/25 Tito Garcia is a 74 y.o. male who presents for Supartz #2 injection into their BILATERAL knees. Reports he has angela using the bike less but has been walking the dog for longer distances, still using the cane. Rates pain as 3/10 currently, had some soreness after the injection last weejk but was able to use the stairs without pain so very excited to see continued improvement.  He says after his last viscosupplementation injection he had no pain whatsoever.  He had full range of motion and no issues.  He says he is very happy with the results of the injections so far.    All previous Progress Notes and imaging results related to this patients chief complaint have been reviewed in preparation for this examination.    Past Medical History  He has a past medical history of A-fib (Multi), Arrhythmia, Arthritis, Headache, Hyperlipidemia, Hypertension, and Other bursal cyst, other site.    He has no past medical history of Cataract.    Surgical History  He has a past surgical history that includes Other surgical history (11/10/2022); Other surgical history (11/10/2022); Other surgical history (11/10/2022); MR angio head wo IV contrast (8/1/2021); Back surgery (2010); Circumcision, primary (1950); Eye surgery (3/2024); Hernia repair (1955); Prostate surgery (2020); Tonsillectomy (?); and [place] tooth extraction (?).     Social History  He reports that he has quit smoking. His smoking use included cigarettes. He has a 15 pack-year smoking history. He has been exposed to tobacco smoke. He has never used smokeless tobacco. He reports that he does not currently use alcohol. He  reports that he does not use drugs.    Family History  Family History[1]     Allergies  Patient has no known allergies.    Historical Clinical Intake  06/30/25 Tito Garcia is a 74 y.o. male who presents for an evaluation of their BILATERAL legs. States that his primary complaint are his knees. He has history of severe tricompartmental arthritis. He was scheduled to have both knees replaced a few years ago by Dr. Raphael however due to heart issues and a DVT at the time he has not been able to do so. States that the left knee is typically worse than the right. 7/10 pain today. States that his pain wraps around but is primarily along the medial joint lines. He has had cortisone injections in his knees in the past and is unsure if he has had viscosupplementation injections. He continues to do cardio exercises for 30 minutes daily by walking and stationary bike and elliptical. He is expecting to have his right knee replaced in October.  He says additionally it has been tough because he has been taking care of his wife who has severe Alzheimer's/dementia that is progressed significantly over the past 2 years enough so that she is not able to take care of herself at times that he has to do so.  He is hoping he can get into some aquatic and physical therapy to get his strength out so he could continue to function to take care of his wife as well as himself.  He also wants to try to continue to lose some weight so if he is able to get his knee replaced he will have better results.  He also says he needs updated imaging for the surgery and also wondering if there is anything he can do for his other knee that he is not getting surgery on to get by.   -----------------------------------------------------------------  07/03/25 Tito Garcia is a 74 y.o. male who presents for MRI review of their BILATERAL knees. States that he continues to have 7/10 pain. States that he has been resting since he was last seen and feels slight  relief. No new or worsening symptoms since he was last seen. States that he feels ready to have his right knee replaced at this time and will be reaching back out to Dr. Raphael.  Apparently according to Dr. Raphael the reason he canceled his surgeries before was because of swelling in his lower legs that he is having few be evaluated for apparently coming up in the near future.  He tentatively is scheduled for a knee replacement in October.      Review of Systems  CONSTITUTIONAL:   Negative for weight change, loss of appetite, fatigue, weakness, fever, chills, night sweats, headaches .           HEENT:   Negative for cold, cough, sore throat, sinus pain, swollen lymph nodes.           OPHTHALMOLOGY:   Negative for diminished vision, blurred vision, loss of vision, double vision.           ALLERGY:   Negative for runny nose, scratchy throat, sinus congestion, rash, facial pressure, nasal congestion, post-nasal drip.           CARDIOLOGY:   Negative for chest pain, palpitations, murmurs, irregular heart beat, shortness of breath, leg edema, dyspnea on exertion, fatigue, dizziness.           RESPIRATORY:   Negative for chest pain, shortness of breath, swelling of the legs, asthma/copd, chest congestion, pain with breathing .           GASTROENTEROLOGY:   Negative for nausea, vomitting, heartburn, constipation, diarrhea, blood in stool, change in bowel habits, black stool.           HEMATOLOGY/LYMPH:   Negative for fatigue, loss of appetitie, easy bruising, easy bleeding, anemia, abnormal bleeding, slow healing.           ENDOCRINOLOGY:   Negative for polyuria, polydipsia, polyphagia, fatigue, weight loss, weight gain, cold intolerance, heat intolerance, diabetes.           MUSCULOSKELETAL:   Positive  for BILATERAL knee pain        DERMATOLOGY:   Negative for rash, bruising.           NEUROLOGY:   Negative for tingling, numbness, gait abnormality, paresthesias, weakness, sciatica.        Examination: Improved since last  weeks viscosupplementation injection  BILATERAL Medial Joint Line over the Medial Meniscus  Edema: Positive  Diffusely.   Effusion: Negative.   Percussion Test:  Negative.   Tuning Fork Test:  Negative.   Ecchymosis/Bruising: Negative.            Palpation: Improved since last weeks viscosupplementation injection  Positive  BILATERAL  Medial Joint Line over the Medial Meniscus    Orientation: Improved since last weeks viscosupplementation injection  Positive Asymmetrical: because of the swelling.     Range of Motion:Improved since last weeks viscosupplementation injection  Positive Knee Flexion (RIGHT 88 degrees, LEFT 90 degrees) Decreased because of pain and swelling  Positive Knee Extension (RIGHT 164 degrees, LEFT 160 degrees) Decreased because of pain and swelling           Muscle Strength:  Improved since last weeks viscosupplementation injection  Positive +4/+5 Quadricep Extension Causes pain  Positive +4/+5 Hamstring Flexion Causes pain  +5+5 Gastrocnemius  +5+5 Soleus.            Proprioception:Improved since last weeks viscosupplementation injection  Pain with Squat: Positive    Pain with Sumo Squat:  Positive   Hop Test: Positive    Single leg balance test Positive            Vascular:   +2/+4 Femoral  +2/+4 Dorsalis Pedis  +2/+4 Posterior Tibial  Capillary Refill less than 2 seconds.                Knee - ACL:  Anterior Drawer Test: Negative   Lachman Test: Negative    Lelli Test: Negative                    KNEE - PCL/POSTERIOR LATERAL CORNER:  Posterior Drawer Test: Negative  Reverse Lachman Test: Negative     KNEE - POPLITEUS:Improved since last weeks viscosupplementation injection  Cyrus's Test: Positive     KNEE - MCL / LCL:  Valgus Stress Test: 90 degrees: [id number] degrees:Negative   Varus Stress Test:  90 degrees: Negative, 20-30 degrees: Negative.   Apley Distraction Test: Negative  Thessaly Test: Unable to perform       KNEE - MENISCUS:Improved since last weeks viscosupplementation  injection  Apley Compression Test:  Positive       Stienmann Test:  Positive   Shasha Test:  Positive     Bounce Home Test:  Positive  Thessaly Test: Unable to perform    KNEE - PATELLA:Improved since last weeks viscosupplementation injection  Apprehension Test:  Positive  Glide Test:  Positive  Grind Test: Positive  Patella Tracking Test: Positive  Fat pad Impingement: Positive             KNEE - QUADRICEPS: Improved since last weeks viscosupplementation injection     VMO Test: Positive   VLO Test:  Negative    Hip/Pelvis - Sacrum:  Leg Length Supine: Positive    Leg Length Supine to Seated (Derbolowsky Sign): Positive     Hip Flexor Tightness: Positive   Hamstring Tightness: Positive           Feet/Foot: Positive BILATERAL Valgus foot         Imaging and Diagnostics Review:  MR knee right wo IV contrast  Order date: 7/1/2025  Interpreted By:  Butch Luis,       FINDINGS:  Anterior cruciate ligament demonstrates diffuse mucoid degeneration  with osteophytosis along inner margin of the lateral femoral condyle  displaces the ACL.      Posterior cruciate ligament is intact. There is intermediate signal  intensity in the distal PCL about the tibial attachment as seen on  prior imaging with sequela of chronic low-grade sprain.      Lateral meniscus demonstrates tear along the free edge of the  posterior horn of the lateral meniscus near the root ligament  insertion.      Medial meniscus demonstrates macerated posterior horn of the medial  meniscus in particular near the posterior body segment. There is  blunted diminutive posterior horn with irregular appearing tear  within the remainder of the posterior horn. Body of the medial  meniscus demonstrates peripheral extrusion and blunted appearance  with tear along the posterior body segment.      Medial collateral ligament complex is bowed in relation to medial  femorotibial osteoarthritis.      Lateral supporting structures are intact. Mild chronic  thickened  proximal fibular collateral ligament.      Extensor mechanism is intact. There is a moderate infrapatellar  subcutaneous edema. Patellofemoral articulation demonstrates severe  loss of articular cartilage throughout the lateral facet/apex of the  patella as well as corresponding lateral trochlea with moderate  thinning throughout the remainder of the trochlea.      Lateral femorotibial compartment demonstrates mild thinning of the  cartilage of the outer margin of the compartment. There is severe  medial femorotibial osteoarthritis with denuded articular cartilage  and subchondral reactive edema and osteophytosis demonstrated.      Small knee joint effusion. Subtle popliteal cyst demonstrated.      IMPRESSION right knee MRI July 1, 2025:  1. 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  2. Macerated posterior horn of the medial meniscus at the posterior  body segment with irregular appearing tear within the remainder of  the posterior horn.  3. Diffuse mucoid degeneration of the ACL which is displaced by  osteophytosis inner margin of the lateral femoral condyle.  4. Lateral meniscus tear free edge posterior horn  5. PCL sprain chronic  6. MCL bowing  7. LCL thickening  8. Subtle popliteal Cyst  9. Small joint effusion    Signed by: Butch Luis 7/1/2025 10:41 AM  Dictation workstation:   [id number]     --------------------------------------------------------------------  MR knee left wo IV contrast  Order date: 7/1/2025  Interpreted By:  Butch Luis,       FINDINGS:  Anterior cruciate ligament is intact. Mild mucoid degeneration of the  ACL. Osteophytosis inner margin of the lateral femoral condyle abuts  the ACL. There is pericruciate ganglion cyst formation about the  femoral attachment of the ACL measuring 6 mm.      Posterior cruciate ligament is intact.      Lateral meniscus demonstrates mild degeneration of the posterior  horn. No  discrete longitudinal tear. Blunted free edge demonstrated.      Medial meniscus demonstrates diminutive posterior horn of the medial  meniscus from the mid posterior horn towards the root ligament  insertion with resultant displaced meniscal flap identified centrally  in the medial femorotibial compartment with meniscal flap identified  measuring 1.8 cm (series 10, image 21 and series 8, image 25).  Alternatively, this may reflect discoid meniscus, but there is a  irregularity of the free edge of the body. Findings are stable from  the remote MRI.      Medial collateral ligament complex is bowed in relation to medial  femorotibial osteoarthritis.      Lateral supporting structures are intact. Moderate popliteus  tendinosis demonstrated      Extensor mechanism is intact. There is mild distal quadriceps  tendinosis. Mild prepatellar and infrapatellar subcutaneous edema.  Patellofemoral articulation demonstrates severe loss of articular  cartilage throughout the lateral facet and apex of the patella mild  subchondral cystic change demonstrated. Also, corresponding severe  thinning of the articular cartilage within the lateral trochlea with  remodeling of the subchondral bone plate and subchondral reactive  edema. Osteophytosis demonstrated along the outer margin of the  medial and lateral trochlear ridge.      Lateral femorotibial compartment demonstrates mild chondromalacia.  There is a mild thinning of the cartilage along the outer margin of  the compartment. Medial femorotibial compartment demonstrates severe  loss of articular cartilage central to outer margin weight-bearing  portion of the compartment with subchondral reactive edema and cystic  change demonstrated. Mild osteophytosis      No knee joint effusion. There is a 5 mm body in the small popliteal  cyst. Musculature about the knee demonstrates intermediate signal  intensity and strain of the plantaris and portions of the lateral  head of the gastrocnemius  at the origin.      IMPRESSION left knee MRI July 1, 2025 :  1. Stable appearing medial meniscus with diminutive posterior horn  from near the mid posterior horn towards the root ligament insertion.  Of note, there is a equivocal displaced meniscal flap centrally in  the intercondylar notch as opposed to discoid medial meniscus with  findings stable from prior imaging.  2. Tricompartmental osteoarthritis with severe patellofemoral and  medial femorotibial osteoarthritis.  3. Mild muscle strain of the lateral head gastrocnemius and plantaris  musculature.  4. Lateral meniscus degeneration  5. ACL degeneration with pericruciate ganglion cyst  6. MCL Bowing  7. Popliteal tendinosis      Signed by: Butch Luis 7/1/2025 10:33 AM  Dictation workstation:   [id number]     ---------------------------------------------------------------------   MR Left knee 10/04/2021    IMPRESSION MRI of the left knee October 4, 2021:  1. Diminutive posterior horn the medial meniscus about the root ligament  insertion in relation to displaced meniscal flap arising from the root  ligament insertion extending centrally into the intercondylar notch with  flap measuring up to 2.1 x 1.5 cm in AP and transverse dimension  respectively.  2. Severe patellofemoral and moderate to severe medial femorotibial  osteoarthritis.   ----------------------------------------------------------------------  MR Right knee November 18, 2021  IMPRESSION MRI of the right knee :  1. Macerated appearance of the body segment and posterior horn medial  meniscus.  2. Severe patellofemoral and medial tibiofemoral osteoarthrosis with areas  of complete chondral loss.  3. MCL bursitis.  4. Large joint effusion and chronic synovitis.  5. Complex Baker's cyst.  6. 2.1 x 1.4 x 1.1 cm loose body within the tibiofemoral joint space  posteriorly.  7. Severe mucoid degeneration versus chronic partial tearing of the ACL.       Assessment   No diagnosis found.      L Inj/Asp:  bilateral knee on 7/22/2025 10:43 AM  Indications: pain and diagnostic evaluation  Details: 22 G needle, ultrasound-guided medial approach  Medications (Right): 25 mg sodium hyaluronate 10 mg/mL  Medications (Left): 25 mg sodium hyaluronate 10 mg/mL  Outcome: tolerated well, no immediate complications  Procedure, treatment alternatives, risks and benefits explained, specific risks discussed. Consent was given by the patient. Immediately prior to procedure a time out was called to verify the correct patient, procedure, equipment, support staff and site/side marked as required. Patient was prepped and draped in the usual sterile fashion.          Procedure   Time Out (5 Minutes): Yes  Patient Name: Tito Garcia  YOB: 1950  Procedure: Supartz Injection 2/5  Needed Supplies Available: Correct Supplies  Laterality: BILATERAL knee  Site Verified and Marked: Correct Site(s) Marked  Timeout Performed by Provider: Dr. Albino Chang D.O.  Staff Initials:     Patient is informed and consent has been signed by the patient if over 18 years old., Patient parent and/or legal guardian is informed and consent has been signed., Patient and/or parent and/or legal guardian accept all risks, benefits, and possible complications associated with procedure(s) and/or manipulation(s) and/or injection(s)., Patient and/or parent and/or legal guardian deny patient allergy or known complications with any of the medications, instruments, products, and/or techniques used., All questions and concerns were answered and/or addressed with the patient and/or parent and/or legal guardian., The patient and/or parent and/or legal guardian agree to proceed with the procedure(s) and/or manipulation(s) and/or injection(s)., Prep: The area was cleansed with a mixture of equal parts rubbing alcohol 70% and Hibclens., Utilizing clean technique, the injection site(s) were marked with a skin marker., and Injection site(s) were anesthestized  with topical analgesic spray prior to injection.    Performed regenerative Supartz Injection 2/5 into the patients BILATERAL knees, under ultrasound.  Supartz FX (25mg/2.5ml)     Band-aid and/or compressive bandage was placed over the injection site(s). After the injection(s) performed osteopathic manipulation therapy to the affected area(s) to help increase blood flow to aid with the healing process as well as circulation of the medication. The patient tolerated the procedure well without any complications. The patient was instructed to gently massage the treatment site(s) as well as to apply moist heat to the affected area(s). Additionally, the patient was instructed to contact the office immediately with any complications or concerns.    The nurse Destinee were present in the room during the entire procedure.    The following discharge instructions were reviewed in detail with the patient to the level of their understanding:    PAIN:  A mild to moderate amount of discomfort, tenderness, stiffness, and achiness-caused by the inflammation of the area can be expected for a few days after the injection. This is normal and good as the inflammation is an important part of the healing process.    SKIN: Due to the numbing spray used, you may develop a red, itchy, scaly rash in the area that was sprayed. Should your skin become itchy, wash the area with mild soap and warm water. If needed, you may apply topical over-the-counter Hydrocortisone cream to alleviate any itchiness. AVOID scratching due to risk of infection.,     BATHING/SWIMMING: You may shower after your procedure with regular soap and water, but no baths, no swimming, and no hot tubs for 3-4 days after the procedure.,     ACTIVITIES:  Immediately following the injection, you may resume daily activities (such as work) and light exercise. We suggest that you refrain from strenuous activity and heavy lifting, particularly the injured body part, for a week  post-procedure, but sometimes this can change as well.    MOIST HEAT/ICE:  Moist heat may be used on the injection area. NO ICE.  MEDICATION: You may continue your prescribed medications and any over-the-counter supplements; however, it is not recommended to use aspirin or anti-inflammatories (Motrin, Advil, Aleve, Ibuprofen, Naproxen etc.) for up to 2 weeks post procedure. Tylenol Extra Strength, Tylenol Arthritis and/or any prescribed narcotics or muscle relaxants are OK to take.    APPOINTMENTS: You should have a follow-up appointment with Dr. Chang scheduled 1-3 weeks as recommended after your procedure for your next round of injections or to follow-up after you have completed your injection series. Please schedule your follow-up appointment on your way out after your procedure, or call the office to schedule these appointments as soon as possible.    PRECAUTIONS: Smoking, caffeine, alcohol, sex and anti-inflammatories post-procedure may reduce the effectiveness of Prolotherapy/Neural Prolotherapy/Prolozone injections. Early, rare post procedure problems that can be concerning are as follows: an increase in pain not relieved by medication (over the counter or prescription), a temperature above 101 degrees, bleeding or progressive, extreme swelling, or numbness.     CALL THE OFFICE OR GO TO THE EMERGENCY ROOM IF ANY OF THESE SYMPTOMS OCCUR.   If you have any questions or problems, please call our office at [phone]       Treatment or Intervention:  May continue to alternate ice and moist heat as needed  Reviewed degenerative meniscus tears, degenerative joint disease of the knees, patellofemoral tracking syndrome, knee instability, and/or patellar tendinitis in detail with the patient to the level of their understanding; a copy of this handout was provided to the patient at the time of this office visit.  Make sure to start into aquatic and physical Therapy 1-2 times a week for 8-10 weeks with manual therapy  as well as dry needling and IASTM  Once again reviewed home exercises to be performed by the patient routinely  Once again stressed the importance of wearing shoes with good stability control to help with the biomechanics affecting the knees and lower extremities  Once again stressed the importance of wearing full foot insoles to help with the biomechanics affecting the knees and lower extremities  Recommendation to continue over-the-counter calcium with vitamin-D 2 -3000+ milligrams a day, as well as  a daily multivitamin.   Recommendation to continue over-the-counter curcumin, turmeric, boswellia, as well as egg shell membrane as directed to aid with joint inflammation.  Recommendation to continue over-the-counter Move Free for joint health.  Patient advised regarding the risks and/or potential adverse reactions and/or side effects of any prescribed medications along with any over-the-counter medications or any supplements used. Patient advised to seek immediate medical care if any adverse reactions occur. The patient and/or patient(s) parent(s) verbalized their understanding  Reviewed bilateral knee MRI in detail with the patient and/or patients parent/legal guardian to their level of understanding; a copy of these results were provided to the patient and/or patients parent/legal guardian at the time of this office visit.  Discussed in detail with the patient to the level of their understanding the possibility in the future of regenerative injections versus corticosteroid injections versus viscosupplementation injections versus a combination if decide against surgical intervention or with what ever knee he decides not to get surgery on  After patient's knee replacement we will get back into the office to check for leg length discrepancy, will provide appropriate millimeter heel lift to be placed in the shoe to accommodate for leg length discrepancy found on standing erect pelvis xray  Make sure to keep follow-up  appointments with Dr. Raphael and I also told him because of Dr. Raphael schedule he needs to get back in touch with them to tell him what knee he needs to get done and to get in for reevaluation to make sure that his surgery is scheduled for October which per Dr. Raphael it seems like this is true however he seems to have appointments lined up for him for lower leg evaluations because of swelling additionally sometime in August  Performed Supartz injection #2 into the patients bilateral knees under ultrasound. The patient tolerated the procedure well and without any adverse effects. Discharge instructions for VISCOSUPPLEMENTATION injections were reviewed in detail with the patient to the level of their understanding; a copy of these instructions were provided to the patient at the time of this office visit.  Follow-up in 1-2 weeks for Supartz #3      NERY SETH on 7/22/25 at 7:56 AM.     Please note that this report has been produced using speech recognition software.  It may contain errors related to grammar, punctuation or spelling.  Electronically signed, but not reviewed.      Albino Chang D.O. FAOASM          [1]   Family History  Problem Relation Name Age of Onset    Hypertension Mother Digna Garcia     Other (Brain Tumor) Mother Digna Jose     Diabetes Mother Digna Jose     Other (Bladder Cancer) Father      Other (cyst on spine) Sister      Melanoma Brother

## 2025-07-28 NOTE — PROGRESS NOTES
"Verbal consent of the patient and/or verbal parental consent for patients under the age of 18 have been obtained to conduct a physical examination at this office visit.    The nurse Destinee was present in the room during the entire visit including, but not limited to the physical examination.    Established patient    History Of Present Illness  07/29/25 Tito Garcia is a 74 y.o. male who presents for Supartz #3 injection into their BILATERAL knees. Patient reports he hears \"popcorn\" in knees when first moving from sitting to standing, using cane to ambulate in office. Rates pain as 3/10 currently, wants to try injections with numbing first today. Scheduled to see Dr. Raphael in 8/19 to discuss knee replacements in the near future.  He says overall he feels like his knees are functioning a lot better    All previous Progress Notes and imaging results related to this patients chief complaint have been reviewed in preparation for this examination.    Past Medical History  He has a past medical history of A-fib (Multi), Arrhythmia, Arthritis, Headache, Hyperlipidemia, Hypertension, and Other bursal cyst, other site.    He has no past medical history of Cataract.    Surgical History  He has a past surgical history that includes Other surgical history (11/10/2022); Other surgical history (11/10/2022); Other surgical history (11/10/2022); MR angio head wo IV contrast (8/1/2021); Back surgery (2010); Circumcision, primary (1950); Eye surgery (3/2024); Hernia repair (1955); Prostate surgery (2020); Tonsillectomy (?); and [place] tooth extraction (?).     Social History  He reports that he has quit smoking. His smoking use included cigarettes. He has a 15 pack-year smoking history. He has been exposed to tobacco smoke. He has never used smokeless tobacco. He reports that he does not currently use alcohol. He reports that he does not use drugs.    Family History  Family History[1]     Allergies  Patient has no known " allergies.    Historical Clinical Intake  06/30/25 Tito Garcia is a 74 y.o. male who presents for an evaluation of their BILATERAL legs. States that his primary complaint are his knees. He has history of severe tricompartmental arthritis. He was scheduled to have both knees replaced a few years ago by Dr. Raphael however due to heart issues and a DVT at the time he has not been able to do so. States that the left knee is typically worse than the right. 7/10 pain today. States that his pain wraps around but is primarily along the medial joint lines. He has had cortisone injections in his knees in the past and is unsure if he has had viscosupplementation injections. He continues to do cardio exercises for 30 minutes daily by walking and stationary bike and elliptical. He is expecting to have his right knee replaced in October.  He says additionally it has been tough because he has been taking care of his wife who has severe Alzheimer's/dementia that is progressed significantly over the past 2 years enough so that she is not able to take care of herself at times that he has to do so.  He is hoping he can get into some aquatic and physical therapy to get his strength out so he could continue to function to take care of his wife as well as himself.  He also wants to try to continue to lose some weight so if he is able to get his knee replaced he will have better results.  He also says he needs updated imaging for the surgery and also wondering if there is anything he can do for his other knee that he is not getting surgery on to get by.   -----------------------------------------------------------------  07/03/25 Tito Garcia is a 74 y.o. male who presents for MRI review of their BILATERAL knees. States that he continues to have 7/10 pain. States that he has been resting since he was last seen and feels slight relief. No new or worsening symptoms since he was last seen. States that he feels ready to have his right knee  replaced at this time and will be reaching back out to Dr. Raphael.  Apparently according to Dr. Raphael the reason he canceled his surgeries before was because of swelling in his lower legs that he is having few be evaluated for apparently coming up in the near future.  He tentatively is scheduled for a knee replacement in October.    -------------------------------------  07/15/25 Tito Garcia is a 74 y.o. male who presents for Supartz #1 injection into their BILATERAL knees. States that he feels 6/10 pain today, right knee worse than left. States that the last week he tried walking the dog and about half way through felt like his right knee was going to give out on him. He continues to do stationary bike and feels that is going well. States that he will be seeing Dr. Raphael for surgical consult on TKR for the right knee in about 4-6 weeks, however when he most recently spoke to him there was not a sense of urgency to fast track a procedure and thus he would like to start supartz injections in both knees today to help him get by until he is able to have the knee replacement.   ----------------------------  07/22/25 Tito Garcia is a 74 y.o. male who presents for Supartz #2 injection into their BILATERAL knees. Reports he has angela using the bike less but has been walking the dog for longer distances, still using the cane. Rates pain as 3/10 currently, had some soreness after the injection last weejk but was able to use the stairs without pain so very excited to see continued improvement.  He says after his last viscosupplementation injection he had no pain whatsoever.  He had full range of motion and no issues.  He says he is very happy with the results of the injections so far.     Review of Systems  CONSTITUTIONAL:   Negative for weight change, loss of appetite, fatigue, weakness, fever, chills, night sweats, headaches .           HEENT:   Negative for cold, cough, sore throat, sinus pain, swollen lymph nodes.            OPHTHALMOLOGY:   Negative for diminished vision, blurred vision, loss of vision, double vision.           ALLERGY:   Negative for runny nose, scratchy throat, sinus congestion, rash, facial pressure, nasal congestion, post-nasal drip.           CARDIOLOGY:   Negative for chest pain, palpitations, murmurs, irregular heart beat, shortness of breath, leg edema, dyspnea on exertion, fatigue, dizziness.           RESPIRATORY:   Negative for chest pain, shortness of breath, swelling of the legs, asthma/copd, chest congestion, pain with breathing .           GASTROENTEROLOGY:   Negative for nausea, vomitting, heartburn, constipation, diarrhea, blood in stool, change in bowel habits, black stool.           HEMATOLOGY/LYMPH:   Negative for fatigue, loss of appetitie, easy bruising, easy bleeding, anemia, abnormal bleeding, slow healing.           ENDOCRINOLOGY:   Negative for polyuria, polydipsia, polyphagia, fatigue, weight loss, weight gain, cold intolerance, heat intolerance, diabetes.           MUSCULOSKELETAL:   Positive  for BILATERAL knee pain        DERMATOLOGY:   Negative for rash, bruising.           NEUROLOGY:   Negative for tingling, numbness, gait abnormality, paresthesias, weakness, sciatica.        Examination: Improved since last weeks viscosupplementation injection  BILATERAL Medial Joint Line over the Medial Meniscus  Edema: Positive  Diffusely.   Effusion: Negative.   Percussion Test:  Negative.   Tuning Fork Test:  Negative.   Ecchymosis/Bruising: Negative.            Palpation: Improved since last weeks viscosupplementation injection  Positive  BILATERAL  Medial Joint Line over the Medial Meniscus    Orientation: Improved since last weeks viscosupplementation injection  Positive Asymmetrical: because of the swelling.     Range of Motion:Improved since last weeks viscosupplementation injection  Positive Knee Flexion (RIGHT 88 degrees, LEFT 90 degrees) Decreased because of pain and swelling  Positive  Knee Extension (RIGHT 164 degrees, LEFT 160 degrees) Decreased because of pain and swelling           Muscle Strength:  Improved since last weeks viscosupplementation injection  Positive +4/+5 Quadricep Extension Causes pain  Positive +4/+5 Hamstring Flexion Causes pain  +5+5 Gastrocnemius  +5+5 Soleus.            Proprioception:Improved since last weeks viscosupplementation injection  Pain with Squat: Positive    Pain with Sumo Squat:  Positive   Hop Test: Positive    Single leg balance test Positive            Vascular:   +2/+4 Femoral  +2/+4 Dorsalis Pedis  +2/+4 Posterior Tibial  Capillary Refill less than 2 seconds.                Knee - ACL:  Anterior Drawer Test: Negative   Lachman Test: Negative    Lelli Test: Negative                    KNEE - PCL/POSTERIOR LATERAL CORNER:  Posterior Drawer Test: Negative  Reverse Lachman Test: Negative     KNEE - POPLITEUS:Improved since last weeks viscosupplementation injection  Cyrus's Test: Positive     KNEE - MCL / LCL:  Valgus Stress Test: 90 degrees: [id number] degrees:Negative   Varus Stress Test:  90 degrees: Negative, 20-30 degrees: Negative.   Apley Distraction Test: Negative  Thessaly Test: Unable to perform       KNEE - MENISCUS:Improved since last weeks viscosupplementation injection  Apley Compression Test:  Positive       Stienmann Test:  Positive   Shasha Test:  Positive     Bounce Home Test:  Positive  Thessaly Test: Unable to perform    KNEE - PATELLA:Improved since last weeks viscosupplementation injection  Apprehension Test:  Positive  Glide Test:  Positive  Grind Test: Positive  Patella Tracking Test: Positive  Fat pad Impingement: Positive             KNEE - QUADRICEPS: Improved since last weeks viscosupplementation injection     VMO Test: Positive   VLO Test:  Negative    Hip/Pelvis - Sacrum:  Leg Length Supine: Positive    Leg Length Supine to Seated (Derbolowsky Sign): Positive     Hip Flexor Tightness: Positive   Hamstring Tightness:  Positive           Feet/Foot: Positive BILATERAL Valgus foot         Imaging and Diagnostics Review:  MR knee right wo IV contrast  Order date: 7/1/2025  Interpreted By:  Butch Luis,       FINDINGS:  Anterior cruciate ligament demonstrates diffuse mucoid degeneration  with osteophytosis along inner margin of the lateral femoral condyle  displaces the ACL.      Posterior cruciate ligament is intact. There is intermediate signal  intensity in the distal PCL about the tibial attachment as seen on  prior imaging with sequela of chronic low-grade sprain.      Lateral meniscus demonstrates tear along the free edge of the  posterior horn of the lateral meniscus near the root ligament  insertion.      Medial meniscus demonstrates macerated posterior horn of the medial  meniscus in particular near the posterior body segment. There is  blunted diminutive posterior horn with irregular appearing tear  within the remainder of the posterior horn. Body of the medial  meniscus demonstrates peripheral extrusion and blunted appearance  with tear along the posterior body segment.      Medial collateral ligament complex is bowed in relation to medial  femorotibial osteoarthritis.      Lateral supporting structures are intact. Mild chronic thickened  proximal fibular collateral ligament.      Extensor mechanism is intact. There is a moderate infrapatellar  subcutaneous edema. Patellofemoral articulation demonstrates severe  loss of articular cartilage throughout the lateral facet/apex of the  patella as well as corresponding lateral trochlea with moderate  thinning throughout the remainder of the trochlea.      Lateral femorotibial compartment demonstrates mild thinning of the  cartilage of the outer margin of the compartment. There is severe  medial femorotibial osteoarthritis with denuded articular cartilage  and subchondral reactive edema and osteophytosis demonstrated.      Small knee joint effusion. Subtle popliteal cyst  demonstrated.      IMPRESSION right knee MRI July 1, 2025:  1. 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  2. Macerated posterior horn of the medial meniscus at the posterior  body segment with irregular appearing tear within the remainder of  the posterior horn.  3. Diffuse mucoid degeneration of the ACL which is displaced by  osteophytosis inner margin of the lateral femoral condyle.  4. Lateral meniscus tear free edge posterior horn  5. PCL sprain chronic  6. MCL bowing  7. LCL thickening  8. Subtle popliteal Cyst  9. Small joint effusion    Signed by: Butch Luis 7/1/2025 10:41 AM  Dictation workstation:   [id number]     --------------------------------------------------------------------  MR knee left wo IV contrast  Order date: 7/1/2025  Interpreted By:  Butch Luis,       FINDINGS:  Anterior cruciate ligament is intact. Mild mucoid degeneration of the  ACL. Osteophytosis inner margin of the lateral femoral condyle abuts  the ACL. There is pericruciate ganglion cyst formation about the  femoral attachment of the ACL measuring 6 mm.      Posterior cruciate ligament is intact.      Lateral meniscus demonstrates mild degeneration of the posterior  horn. No discrete longitudinal tear. Blunted free edge demonstrated.      Medial meniscus demonstrates diminutive posterior horn of the medial  meniscus from the mid posterior horn towards the root ligament  insertion with resultant displaced meniscal flap identified centrally  in the medial femorotibial compartment with meniscal flap identified  measuring 1.8 cm (series 10, image 21 and series 8, image 25).  Alternatively, this may reflect discoid meniscus, but there is a  irregularity of the free edge of the body. Findings are stable from  the remote MRI.      Medial collateral ligament complex is bowed in relation to medial  femorotibial osteoarthritis.      Lateral supporting structures are intact.  Moderate popliteus  tendinosis demonstrated      Extensor mechanism is intact. There is mild distal quadriceps  tendinosis. Mild prepatellar and infrapatellar subcutaneous edema.  Patellofemoral articulation demonstrates severe loss of articular  cartilage throughout the lateral facet and apex of the patella mild  subchondral cystic change demonstrated. Also, corresponding severe  thinning of the articular cartilage within the lateral trochlea with  remodeling of the subchondral bone plate and subchondral reactive  edema. Osteophytosis demonstrated along the outer margin of the  medial and lateral trochlear ridge.      Lateral femorotibial compartment demonstrates mild chondromalacia.  There is a mild thinning of the cartilage along the outer margin of  the compartment. Medial femorotibial compartment demonstrates severe  loss of articular cartilage central to outer margin weight-bearing  portion of the compartment with subchondral reactive edema and cystic  change demonstrated. Mild osteophytosis      No knee joint effusion. There is a 5 mm body in the small popliteal  cyst. Musculature about the knee demonstrates intermediate signal  intensity and strain of the plantaris and portions of the lateral  head of the gastrocnemius at the origin.      IMPRESSION left knee MRI July 1, 2025 :  1. Stable appearing medial meniscus with diminutive posterior horn  from near the mid posterior horn towards the root ligament insertion.  Of note, there is a equivocal displaced meniscal flap centrally in  the intercondylar notch as opposed to discoid medial meniscus with  findings stable from prior imaging.  2. Tricompartmental osteoarthritis with severe patellofemoral and  medial femorotibial osteoarthritis.  3. Mild muscle strain of the lateral head gastrocnemius and plantaris  musculature.  4. Lateral meniscus degeneration  5. ACL degeneration with pericruciate ganglion cyst  6. MCL Bowing  7. Popliteal tendinosis      Signed  by: Butch Luis 7/1/2025 10:33 AM  Dictation workstation:   [id number]     ---------------------------------------------------------------------   MR Left knee 10/04/2021    IMPRESSION MRI of the left knee October 4, 2021:  1. Diminutive posterior horn the medial meniscus about the root ligament  insertion in relation to displaced meniscal flap arising from the root  ligament insertion extending centrally into the intercondylar notch with  flap measuring up to 2.1 x 1.5 cm in AP and transverse dimension  respectively.  2. Severe patellofemoral and moderate to severe medial femorotibial  osteoarthritis.   ----------------------------------------------------------------------  MR Right knee November 18, 2021  IMPRESSION MRI of the right knee :  1. Macerated appearance of the body segment and posterior horn medial  meniscus.  2. Severe patellofemoral and medial tibiofemoral osteoarthrosis with areas  of complete chondral loss.  3. MCL bursitis.  4. Large joint effusion and chronic synovitis.  5. Complex Baker's cyst.  6. 2.1 x 1.4 x 1.1 cm loose body within the tibiofemoral joint space  posteriorly.  7. Severe mucoid degeneration versus chronic partial tearing of the ACL.       Assessment   1. Primary osteoarthritis of right knee        2. Degenerative tear of posterior horn of medial meniscus of right knee        3. Cyst, baker's knee, right        4. Loose body in knee, right knee        5. Derangement of other medial meniscus due to old tear or injury, right knee        6. Degenerative tear of posterior horn of lateral meniscus of right knee        7. Baker's cyst of knee, right        8. Rupture of anterior cruciate ligament of right knee, subsequent encounter        9. Primary osteoarthritis of left knee        10. Patellar bursitis of left knee        11. Derangement of unspecified medial meniscus due to old tear or injury, left knee        12. Derangement of posterior horn of medial meniscus due to old  tear or injury, left knee              L Inj/Asp: bilateral knee on 7/29/2025 9:56 AM  Indications: pain and diagnostic evaluation  Details: 22 G needle, ultrasound-guided lateral approach  Medications (Right): 25 mg sodium hyaluronate 10 mg/mL  Medications (Left): 25 mg sodium hyaluronate 10 mg/mL  Outcome: tolerated well, no immediate complications  Procedure, treatment alternatives, risks and benefits explained, specific risks discussed. Consent was given by the patient. Immediately prior to procedure a time out was called to verify the correct patient, procedure, equipment, support staff and site/side marked as required. Patient was prepped and draped in the usual sterile fashion.          Procedure   Time Out (5 Minutes): Yes  Patient Name: Tito Garcia  YOB: 1950  Procedure: Supartz Injection 3/5  Needed Supplies Available: Correct Supplies  Laterality: BILATERAL knee  Site Verified and Marked: Correct Site(s) Marked  Timeout Performed by Provider: Dr. Albino Chang D.O.  Staff Initials:     Patient is informed and consent has been signed by the patient if over 18 years old., Patient parent and/or legal guardian is informed and consent has been signed., Patient and/or parent and/or legal guardian accept all risks, benefits, and possible complications associated with procedure(s) and/or manipulation(s) and/or injection(s)., Patient and/or parent and/or legal guardian deny patient allergy or known complications with any of the medications, instruments, products, and/or techniques used., All questions and concerns were answered and/or addressed with the patient and/or parent and/or legal guardian., The patient and/or parent and/or legal guardian agree to proceed with the procedure(s) and/or manipulation(s) and/or injection(s)., Prep: The area was cleansed with a mixture of equal parts rubbing alcohol 70% and Hibclens., Utilizing clean technique, the injection site(s) were marked with a skin  marker., and Injection site(s) were anesthestized with topical analgesic spray prior to injection.    Site was pre-anesthetized with approximately 11mL of a split solution containinmL of Lidocaine 2% (NDC: 4319-4966-11; LOT #: [id number] EXP: 2026)  5mL of xylocaine 20mg/mL (NDC: 96774-505-69 ; LOT #: [id number] EXP: 2028)  1mL of Sodium Bicarbonate 8.4%. (NDC: [address]-5; LOT #: [id number] EXP: 2026)    Performed regenerative Supartz Injection 3/5 into the patients BILATERAL knees, under ultrasound.  Supartz FX (25mg/2.5ml)     Band-aid and/or compressive bandage was placed over the injection site(s). After the injection(s) performed osteopathic manipulation therapy to the affected area(s) to help increase blood flow to aid with the healing process as well as circulation of the medication. The patient tolerated the procedure well without any complications. The patient was instructed to gently massage the treatment site(s) as well as to apply moist heat to the affected area(s). Additionally, the patient was instructed to contact the office immediately with any complications or concerns.    The nurse Destinee were present in the room during the entire procedure.    The following discharge instructions were reviewed in detail with the patient to the level of their understanding:    PAIN:  A mild to moderate amount of discomfort, tenderness, stiffness, and achiness-caused by the inflammation of the area can be expected for a few days after the injection. This is normal and good as the inflammation is an important part of the healing process.    SKIN: Due to the numbing spray used, you may develop a red, itchy, scaly rash in the area that was sprayed. Should your skin become itchy, wash the area with mild soap and warm water. If needed, you may apply topical over-the-counter Hydrocortisone cream to alleviate any itchiness. AVOID scratching due to risk of infection.,     BATHING/SWIMMING: You may shower  after your procedure with regular soap and water, but no baths, no swimming, and no hot tubs for 3-4 days after the procedure.,     ACTIVITIES:  Immediately following the injection, you may resume daily activities (such as work) and light exercise. We suggest that you refrain from strenuous activity and heavy lifting, particularly the injured body part, for a week post-procedure, but sometimes this can change as well.    MOIST HEAT/ICE:  Moist heat may be used on the injection area. NO ICE.  MEDICATION: You may continue your prescribed medications and any over-the-counter supplements; however, it is not recommended to use aspirin or anti-inflammatories (Motrin, Advil, Aleve, Ibuprofen, Naproxen etc.) for up to 2 weeks post procedure. Tylenol Extra Strength, Tylenol Arthritis and/or any prescribed narcotics or muscle relaxants are OK to take.    APPOINTMENTS: You should have a follow-up appointment with Dr. Chang scheduled 1-3 weeks as recommended after your procedure for your next round of injections or to follow-up after you have completed your injection series. Please schedule your follow-up appointment on your way out after your procedure, or call the office to schedule these appointments as soon as possible.    PRECAUTIONS: Smoking, caffeine, alcohol, sex and anti-inflammatories post-procedure may reduce the effectiveness of Prolotherapy/Neural Prolotherapy/Prolozone injections. Early, rare post procedure problems that can be concerning are as follows: an increase in pain not relieved by medication (over the counter or prescription), a temperature above 101 degrees, bleeding or progressive, extreme swelling, or numbness.     CALL THE OFFICE OR GO TO THE EMERGENCY ROOM IF ANY OF THESE SYMPTOMS OCCUR.   If you have any questions or problems, please call our office at [phone]       Treatment or Intervention:  May continue to alternate ice and moist heat as needed  Reviewed degenerative meniscus tears,  degenerative joint disease of the knees, patellofemoral tracking syndrome, knee instability, and/or patellar tendinitis in detail with the patient to the level of their understanding; a copy of this handout was provided to the patient at the time of this office visit.  Make sure to start into aquatic and physical Therapy 1-2 times a week for 8-10 weeks with manual therapy as well as dry needling and IASTM  Once again reviewed home exercises to be performed by the patient routinely  Once again stressed the importance of wearing shoes with good stability control to help with the biomechanics affecting the knees and lower extremities  Once again stressed the importance of wearing full foot insoles to help with the biomechanics affecting the knees and lower extremities  Recommendation to continue over-the-counter calcium with vitamin-D 2 -3000+ milligrams a day, as well as  a daily multivitamin.   Recommendation to continue over-the-counter curcumin, turmeric, boswellia, as well as egg shell membrane as directed to aid with joint inflammation.  Recommendation to continue over-the-counter Move Free for joint health.  Patient advised regarding the risks and/or potential adverse reactions and/or side effects of any prescribed medications along with any over-the-counter medications or any supplements used. Patient advised to seek immediate medical care if any adverse reactions occur. The patient and/or patient(s) parent(s) verbalized their understanding  Reviewed bilateral knee MRI in detail with the patient and/or patients parent/legal guardian to their level of understanding; a copy of these results were provided to the patient and/or patients parent/legal guardian at the time of this office visit.  Discussed in detail with the patient to the level of their understanding the possibility in the future of regenerative injections versus corticosteroid injections versus viscosupplementation injections versus a combination if  decide against surgical intervention or with what ever knee he decides not to get surgery on  After patient's knee replacement we will get back into the office to check for leg length discrepancy, will provide appropriate millimeter heel lift to be placed in the shoe to accommodate for leg length discrepancy found on standing erect pelvis xray  Make sure to keep follow-up appointments with Dr. Raphael and I also told him because of Dr. Raphael schedule he needs to get back in touch with them to tell him what knee he needs to get done and to get in for reevaluation to make sure that his surgery is scheduled for October which per Dr. Raphael it seems like this is true however he seems to have appointments lined up for him for lower leg evaluations because of swelling additionally sometime in August  Performed Supartz injection #3 into the patients bilateral knees under ultrasound. The patient tolerated the procedure well and without any adverse effects. Discharge instructions for VISCOSUPPLEMENTATION injections were reviewed in detail with the patient to the level of their understanding; a copy of these instructions were provided to the patient at the time of this office visit.  Follow-up in 1-2 weeks for Supartz #4 bilateral knees under ultrasound      NERY SETH on 7/29/25 at 9:35 AM.     Please note that this report has been produced using speech recognition software.  It may contain errors related to grammar, punctuation or spelling.  Electronically signed, but not reviewed.      Albino Chang D.O. FAOASM            [1]   Family History  Problem Relation Name Age of Onset    Hypertension Mother Digna Garcia     Other (Brain Tumor) Mother Digna Garcia     Diabetes Mother Digna Garcia     Other (Bladder Cancer) Father      Other (cyst on spine) Sister      Melanoma Brother

## 2025-07-29 ENCOUNTER — OFFICE VISIT (OUTPATIENT)
Dept: ORTHOPEDIC SURGERY | Facility: CLINIC | Age: 75
End: 2025-07-29
Payer: MEDICARE

## 2025-07-29 ENCOUNTER — HOSPITAL ENCOUNTER (OUTPATIENT)
Dept: RADIOLOGY | Facility: EXTERNAL LOCATION | Age: 75
Discharge: HOME | End: 2025-07-29

## 2025-07-29 VITALS
HEART RATE: 65 BPM | WEIGHT: 265 LBS | BODY MASS INDEX: 37.1 KG/M2 | SYSTOLIC BLOOD PRESSURE: 114 MMHG | DIASTOLIC BLOOD PRESSURE: 70 MMHG | HEIGHT: 71 IN

## 2025-07-29 DIAGNOSIS — M71.21 BAKER'S CYST OF KNEE, RIGHT: ICD-10-CM

## 2025-07-29 DIAGNOSIS — M17.12 PRIMARY OSTEOARTHRITIS OF LEFT KNEE: ICD-10-CM

## 2025-07-29 DIAGNOSIS — M23.231 DERANGEMENT OF OTHER MEDIAL MENISCUS DUE TO OLD TEAR OR INJURY, RIGHT KNEE: ICD-10-CM

## 2025-07-29 DIAGNOSIS — S83.511D RUPTURE OF ANTERIOR CRUCIATE LIGAMENT OF RIGHT KNEE, SUBSEQUENT ENCOUNTER: ICD-10-CM

## 2025-07-29 DIAGNOSIS — M23.321 DEGENERATIVE TEAR OF POSTERIOR HORN OF MEDIAL MENISCUS OF RIGHT KNEE: ICD-10-CM

## 2025-07-29 DIAGNOSIS — M23.204 DERANGEMENT OF UNSPECIFIED MEDIAL MENISCUS DUE TO OLD TEAR OR INJURY, LEFT KNEE: ICD-10-CM

## 2025-07-29 DIAGNOSIS — M23.41 LOOSE BODY IN KNEE, RIGHT KNEE: ICD-10-CM

## 2025-07-29 DIAGNOSIS — M70.52 PATELLAR BURSITIS OF LEFT KNEE: ICD-10-CM

## 2025-07-29 DIAGNOSIS — M23.351 DEGENERATIVE TEAR OF POSTERIOR HORN OF LATERAL MENISCUS OF RIGHT KNEE: ICD-10-CM

## 2025-07-29 DIAGNOSIS — M17.11 PRIMARY OSTEOARTHRITIS OF RIGHT KNEE: ICD-10-CM

## 2025-07-29 DIAGNOSIS — M23.222 DERANGEMENT OF POSTERIOR HORN OF MEDIAL MENISCUS DUE TO OLD TEAR OR INJURY, LEFT KNEE: ICD-10-CM

## 2025-07-29 DIAGNOSIS — M71.21: ICD-10-CM

## 2025-07-29 PROCEDURE — 3074F SYST BP LT 130 MM HG: CPT | Performed by: FAMILY MEDICINE

## 2025-07-29 PROCEDURE — 2500000004 HC RX 250 GENERAL PHARMACY W/ HCPCS (ALT 636 FOR OP/ED): Performed by: FAMILY MEDICINE

## 2025-07-29 PROCEDURE — 99214 OFFICE O/P EST MOD 30 MIN: CPT | Performed by: FAMILY MEDICINE

## 2025-07-29 PROCEDURE — 1159F MED LIST DOCD IN RCRD: CPT | Performed by: FAMILY MEDICINE

## 2025-07-29 PROCEDURE — 20611 DRAIN/INJ JOINT/BURSA W/US: CPT | Mod: 50 | Performed by: FAMILY MEDICINE

## 2025-07-29 PROCEDURE — 3078F DIAST BP <80 MM HG: CPT | Performed by: FAMILY MEDICINE

## 2025-07-29 PROCEDURE — 1125F AMNT PAIN NOTED PAIN PRSNT: CPT | Performed by: FAMILY MEDICINE

## 2025-07-29 PROCEDURE — 3008F BODY MASS INDEX DOCD: CPT | Performed by: FAMILY MEDICINE

## 2025-07-29 RX ADMIN — SODIUM HYALURONATE INTRA-ARTICULAR SOLN PREF SYR 25 MG/2.5ML 25 MG: 25/2.5 SOLUTION PREFILLED SYRINGE at 09:56

## 2025-07-29 ASSESSMENT — PAIN SCALES - GENERAL: PAINLEVEL_OUTOF10: 3

## 2025-07-29 ASSESSMENT — ENCOUNTER SYMPTOMS
OCCASIONAL FEELINGS OF UNSTEADINESS: 1
DEPRESSION: 0
LOSS OF SENSATION IN FEET: 0

## 2025-08-04 ENCOUNTER — EVALUATION (OUTPATIENT)
Dept: PHYSICAL THERAPY | Facility: CLINIC | Age: 75
End: 2025-08-04
Payer: MEDICARE

## 2025-08-04 DIAGNOSIS — M25.661 STIFFNESS OF BOTH KNEES: ICD-10-CM

## 2025-08-04 DIAGNOSIS — M62.81 MUSCLE WEAKNESS: Primary | ICD-10-CM

## 2025-08-04 DIAGNOSIS — M25.562 BILATERAL KNEE PAIN: ICD-10-CM

## 2025-08-04 DIAGNOSIS — M25.662 STIFFNESS OF BOTH KNEES: ICD-10-CM

## 2025-08-04 DIAGNOSIS — M25.561 BILATERAL KNEE PAIN: ICD-10-CM

## 2025-08-04 PROCEDURE — 97110 THERAPEUTIC EXERCISES: CPT | Mod: GP

## 2025-08-04 PROCEDURE — 97162 PT EVAL MOD COMPLEX 30 MIN: CPT | Mod: GP

## 2025-08-04 NOTE — PROGRESS NOTES
"Verbal consent of the patient and/or verbal parental consent for patients under the age of 18 have been obtained to conduct a physical examination at this office visit.    The nurse Destinee was present in the room during the entire visit including, but not limited to the physical examination.    Established patient    History Of Present Illness  08/05/25 Tito Garcia \"David\" is a 74 y.o. male who presents for Supartz #4 injection into their BILATERAL knees. Patient reports he had increased stiffness and difficulty walking after last injection with numbing, unsure if he will get numbing again. Rates pain as 2/10 currently with Right being worse than left.  He said with today's injections he would rather go without the numbing to see how he does in comparison to last week.  He still would like to try to put off getting his replacement done until later this year or at the beginning of next year.  He is still trying to get into work with Dr. Raphael to get the replacement done on the 1 knee that hurts him more especially since the right ones worse and then down the line he will do the left 1.    All previous Progress Notes and imaging results related to this patients chief complaint have been reviewed in preparation for this examination.    Past Medical History  He has a past medical history of A-fib (Multi), Arrhythmia, Arthritis, Headache, Hyperlipidemia, Hypertension, and Other bursal cyst, other site.    He has no past medical history of Cataract.    Surgical History  He has a past surgical history that includes Other surgical history (11/10/2022); Other surgical history (11/10/2022); Other surgical history (11/10/2022); MR angio head wo IV contrast (8/1/2021); Back surgery (2010); Circumcision, primary (1950); Eye surgery (3/2024); Hernia repair (1955); Prostate surgery (2020); Tonsillectomy (?); and [place] tooth extraction (?).     Social History  He reports that he has quit smoking. His smoking use included " cigarettes. He has a 15 pack-year smoking history. He has been exposed to tobacco smoke. He has never used smokeless tobacco. He reports that he does not currently use alcohol. He reports that he does not use drugs.    Family History  Family History[1]     Allergies  Patient has no known allergies.    Historical Clinical Intake  06/30/25 Tito Garcia is a 74 y.o. male who presents for an evaluation of their BILATERAL legs. States that his primary complaint are his knees. He has history of severe tricompartmental arthritis. He was scheduled to have both knees replaced a few years ago by Dr. Raphael however due to heart issues and a DVT at the time he has not been able to do so. States that the left knee is typically worse than the right. 7/10 pain today. States that his pain wraps around but is primarily along the medial joint lines. He has had cortisone injections in his knees in the past and is unsure if he has had viscosupplementation injections. He continues to do cardio exercises for 30 minutes daily by walking and stationary bike and elliptical. He is expecting to have his right knee replaced in October.  He says additionally it has been tough because he has been taking care of his wife who has severe Alzheimer's/dementia that is progressed significantly over the past 2 years enough so that she is not able to take care of herself at times that he has to do so.  He is hoping he can get into some aquatic and physical therapy to get his strength out so he could continue to function to take care of his wife as well as himself.  He also wants to try to continue to lose some weight so if he is able to get his knee replaced he will have better results.  He also says he needs updated imaging for the surgery and also wondering if there is anything he can do for his other knee that he is not getting surgery on to get by.   -----------------------------------------------------------------  07/03/25 Tito Garcia is a 74  y.o. male who presents for MRI review of their BILATERAL knees. States that he continues to have 7/10 pain. States that he has been resting since he was last seen and feels slight relief. No new or worsening symptoms since he was last seen. States that he feels ready to have his right knee replaced at this time and will be reaching back out to Dr. Raphael.  Apparently according to Dr. Raphael the reason he canceled his surgeries before was because of swelling in his lower legs that he is having few be evaluated for apparently coming up in the near future.  He tentatively is scheduled for a knee replacement in October.    -------------------------------------  07/15/25 Tito Garcia is a 74 y.o. male who presents for Supartz #1 injection into their BILATERAL knees. States that he feels 6/10 pain today, right knee worse than left. States that the last week he tried walking the dog and about half way through felt like his right knee was going to give out on him. He continues to do stationary bike and feels that is going well. States that he will be seeing Dr. Raphael for surgical consult on TKR for the right knee in about 4-6 weeks, however when he most recently spoke to him there was not a sense of urgency to fast track a procedure and thus he would like to start supartz injections in both knees today to help him get by until he is able to have the knee replacement.   ----------------------------  07/22/25 Tito Garcia is a 74 y.o. male who presents for Supartz #2 injection into their BILATERAL knees. Reports he has angela using the bike less but has been walking the dog for longer distances, still using the cane. Rates pain as 3/10 currently, had some soreness after the injection last weejk but was able to use the stairs without pain so very excited to see continued improvement.  He says after his last viscosupplementation injection he had no pain whatsoever.  He had full range of motion and no issues.  He says he is very happy  "with the results of the injections so far.   -----------------------------  07/29/25 Tito Garcia is a 74 y.o. male who presents for Supartz #3 injection into their BILATERAL knees. Patient reports he hears \"popcorn\" in knees when first moving from sitting to standing, using cane to ambulate in office. Rates pain as 3/10 currently, wants to try injections with numbing first today. Scheduled to see Dr. Raphael in 8/19 to discuss knee replacements in the near future.  He says overall he feels like his knees are functioning a lot better     Review of Systems  CONSTITUTIONAL:   Negative for weight change, loss of appetite, fatigue, weakness, fever, chills, night sweats, headaches .           HEENT:   Negative for cold, cough, sore throat, sinus pain, swollen lymph nodes.           OPHTHALMOLOGY:   Negative for diminished vision, blurred vision, loss of vision, double vision.           ALLERGY:   Negative for runny nose, scratchy throat, sinus congestion, rash, facial pressure, nasal congestion, post-nasal drip.           CARDIOLOGY:   Negative for chest pain, palpitations, murmurs, irregular heart beat, shortness of breath, leg edema, dyspnea on exertion, fatigue, dizziness.           RESPIRATORY:   Negative for chest pain, shortness of breath, swelling of the legs, asthma/copd, chest congestion, pain with breathing .           GASTROENTEROLOGY:   Negative for nausea, vomitting, heartburn, constipation, diarrhea, blood in stool, change in bowel habits, black stool.           HEMATOLOGY/LYMPH:   Negative for fatigue, loss of appetitie, easy bruising, easy bleeding, anemia, abnormal bleeding, slow healing.           ENDOCRINOLOGY:   Negative for polyuria, polydipsia, polyphagia, fatigue, weight loss, weight gain, cold intolerance, heat intolerance, diabetes.           MUSCULOSKELETAL:   Positive  for BILATERAL knee pain        DERMATOLOGY:   Negative for rash, bruising.           NEUROLOGY:   Negative for tingling, " numbness, gait abnormality, paresthesias, weakness, sciatica.        Examination: All findings continued improvement with viscosupplementation injection  BILATERAL Medial Joint Line over the Medial Meniscus  Edema: Positive  Diffusely.  Improving week to week  Effusion: Negative.   Percussion Test:  Negative.   Tuning Fork Test:  Negative.   Ecchymosis/Bruising: Negative.            Palpation: All findings continued improvement with viscosupplementation injection  Positive  BILATERAL still noted over bilateral Medial Joint Line over the Medial Meniscus    Orientation: All findings continued improvement with viscosupplementation injection  Positive Asymmetrical: because of the swelling.  Improving    Range of Motion:All findings continued improvement with viscosupplementation injection  Positive Knee Flexion (RIGHT 88 degrees, LEFT 90 degrees) Decreased because of more stiffness   positive Knee Extension (RIGHT 164 degrees, LEFT 160 degrees) Decreased because of more stiffness           Muscle Strength: All findings continued improvement with viscosupplementation injection  Positive +4/+5 Quadricep Extension Causes some minimal pain still  Positive +4/+5 Hamstring Flexion Causes some minimal pain still  +5+5 Gastrocnemius  +5+5 Soleus.            Proprioception:All findings continued improvement with viscosupplementation injection  Pain with Squat: Positive    Pain with Sumo Squat:  Positive   Hop Test: Positive    Single leg balance test Positive            Vascular:   +2/+4 Femoral  +2/+4 Dorsalis Pedis  +2/+4 Posterior Tibial  Capillary Refill less than 2 seconds.                Knee - ACL:  Anterior Drawer Test: Negative   Lachman Test: Negative    Lelli Test: Negative                    KNEE - PCL/POSTERIOR LATERAL CORNER:  Posterior Drawer Test: Negative  Reverse Lachman Test: Negative     KNEE - POPLITEUS:All findings continued improvement with viscosupplementation injection  Cyrsu's Test: Positive     KNEE  - MCL / LCL:  Valgus Stress Test: 90 degrees: [id number] degrees:Negative   Varus Stress Test:  90 degrees: Negative, 20-30 degrees: Negative.   Apley Distraction Test: Negative  Thessaly Test: Unable to perform       KNEE - MENISCUS:All findings continued improvement with viscosupplementation injection  Apley Compression Test:  Positive       Stienmann Test:  Positive   Shasha Test:  Positive     Bounce Home Test:  Positive  Thessaly Test: Unable to perform    KNEE - PATELLA:All findings continued improvement with viscosupplementation injection  Apprehension Test:  Positive  Glide Test:  Positive  Grind Test: Positive  Patella Tracking Test: Positive  Fat pad Impingement: Positive             KNEE - QUADRICEPS: All findings continued improvement with viscosupplementation injection     VMO Test: Positive   VLO Test:  Negative    Hip/Pelvis - Sacrum:  Leg Length Supine: Positive    Leg Length Supine to Seated (Derbolowsky Sign): Positive     Hip Flexor Tightness: Positive   Hamstring Tightness: Positive           Feet/Foot: Positive BILATERAL Valgus foot         Imaging and Diagnostics Review:  MR knee right wo IV contrast  Order date: 7/1/2025  Interpreted By:  Butch Luis,       FINDINGS:  Anterior cruciate ligament demonstrates diffuse mucoid degeneration  with osteophytosis along inner margin of the lateral femoral condyle  displaces the ACL.      Posterior cruciate ligament is intact. There is intermediate signal  intensity in the distal PCL about the tibial attachment as seen on  prior imaging with sequela of chronic low-grade sprain.      Lateral meniscus demonstrates tear along the free edge of the  posterior horn of the lateral meniscus near the root ligament  insertion.      Medial meniscus demonstrates macerated posterior horn of the medial  meniscus in particular near the posterior body segment. There is  blunted diminutive posterior horn with irregular appearing tear  within the remainder of  the posterior horn. Body of the medial  meniscus demonstrates peripheral extrusion and blunted appearance  with tear along the posterior body segment.      Medial collateral ligament complex is bowed in relation to medial  femorotibial osteoarthritis.      Lateral supporting structures are intact. Mild chronic thickened  proximal fibular collateral ligament.      Extensor mechanism is intact. There is a moderate infrapatellar  subcutaneous edema. Patellofemoral articulation demonstrates severe  loss of articular cartilage throughout the lateral facet/apex of the  patella as well as corresponding lateral trochlea with moderate  thinning throughout the remainder of the trochlea.      Lateral femorotibial compartment demonstrates mild thinning of the  cartilage of the outer margin of the compartment. There is severe  medial femorotibial osteoarthritis with denuded articular cartilage  and subchondral reactive edema and osteophytosis demonstrated.      Small knee joint effusion. Subtle popliteal cyst demonstrated.      IMPRESSION right knee MRI July 1, 2025:  1. 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  2. Macerated posterior horn of the medial meniscus at the posterior  body segment with irregular appearing tear within the remainder of  the posterior horn.  3. Diffuse mucoid degeneration of the ACL which is displaced by  osteophytosis inner margin of the lateral femoral condyle.  4. Lateral meniscus tear free edge posterior horn  5. PCL sprain chronic  6. MCL bowing  7. LCL thickening  8. Subtle popliteal Cyst  9. Small joint effusion    Signed by: Butch Luis 7/1/2025 10:41 AM  Dictation workstation:   [id number]     --------------------------------------------------------------------  MR knee left wo IV contrast  Order date: 7/1/2025  Interpreted By:  Butch Luis,       FINDINGS:  Anterior cruciate ligament is intact. Mild mucoid degeneration of  the  ACL. Osteophytosis inner margin of the lateral femoral condyle abuts  the ACL. There is pericruciate ganglion cyst formation about the  femoral attachment of the ACL measuring 6 mm.      Posterior cruciate ligament is intact.      Lateral meniscus demonstrates mild degeneration of the posterior  horn. No discrete longitudinal tear. Blunted free edge demonstrated.      Medial meniscus demonstrates diminutive posterior horn of the medial  meniscus from the mid posterior horn towards the root ligament  insertion with resultant displaced meniscal flap identified centrally  in the medial femorotibial compartment with meniscal flap identified  measuring 1.8 cm (series 10, image 21 and series 8, image 25).  Alternatively, this may reflect discoid meniscus, but there is a  irregularity of the free edge of the body. Findings are stable from  the remote MRI.      Medial collateral ligament complex is bowed in relation to medial  femorotibial osteoarthritis.      Lateral supporting structures are intact. Moderate popliteus  tendinosis demonstrated      Extensor mechanism is intact. There is mild distal quadriceps  tendinosis. Mild prepatellar and infrapatellar subcutaneous edema.  Patellofemoral articulation demonstrates severe loss of articular  cartilage throughout the lateral facet and apex of the patella mild  subchondral cystic change demonstrated. Also, corresponding severe  thinning of the articular cartilage within the lateral trochlea with  remodeling of the subchondral bone plate and subchondral reactive  edema. Osteophytosis demonstrated along the outer margin of the  medial and lateral trochlear ridge.      Lateral femorotibial compartment demonstrates mild chondromalacia.  There is a mild thinning of the cartilage along the outer margin of  the compartment. Medial femorotibial compartment demonstrates severe  loss of articular cartilage central to outer margin weight-bearing  portion of the compartment with  subchondral reactive edema and cystic  change demonstrated. Mild osteophytosis      No knee joint effusion. There is a 5 mm body in the small popliteal  cyst. Musculature about the knee demonstrates intermediate signal  intensity and strain of the plantaris and portions of the lateral  head of the gastrocnemius at the origin.      IMPRESSION left knee MRI July 1, 2025 :  1. Stable appearing medial meniscus with diminutive posterior horn  from near the mid posterior horn towards the root ligament insertion.  Of note, there is a equivocal displaced meniscal flap centrally in  the intercondylar notch as opposed to discoid medial meniscus with  findings stable from prior imaging.  2. Tricompartmental osteoarthritis with severe patellofemoral and  medial femorotibial osteoarthritis.  3. Mild muscle strain of the lateral head gastrocnemius and plantaris  musculature.  4. Lateral meniscus degeneration  5. ACL degeneration with pericruciate ganglion cyst  6. MCL Bowing  7. Popliteal tendinosis      Signed by: Butch Luis 7/1/2025 10:33 AM  Dictation workstation:   [id number]     ---------------------------------------------------------------------   MR Left knee 10/04/2021    IMPRESSION MRI of the left knee October 4, 2021:  1. Diminutive posterior horn the medial meniscus about the root ligament  insertion in relation to displaced meniscal flap arising from the root  ligament insertion extending centrally into the intercondylar notch with  flap measuring up to 2.1 x 1.5 cm in AP and transverse dimension  respectively.  2. Severe patellofemoral and moderate to severe medial femorotibial  osteoarthritis.   ----------------------------------------------------------------------  MR Right knee November 18, 2021  IMPRESSION MRI of the right knee :  1. Macerated appearance of the body segment and posterior horn medial  meniscus.  2. Severe patellofemoral and medial tibiofemoral osteoarthrosis with areas  of complete  "chondral loss.  3. MCL bursitis.  4. Large joint effusion and chronic synovitis.  5. Complex Baker's cyst.  6. 2.1 x 1.4 x 1.1 cm loose body within the tibiofemoral joint space  posteriorly.  7. Severe mucoid degeneration versus chronic partial tearing of the ACL.       Assessment   1. Primary osteoarthritis of right knee        2. Degenerative tear of posterior horn of medial meniscus of right knee        3. Cyst, baker's knee, right        4. Patellar bursitis of left knee        5. 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 Loose body in knee, right knee        7. Derangement of posterior horn of medial meniscus due to old tear or injury, left knee        8. Derangement of other medial meniscus due to old tear or injury, right knee        9. Baker's cyst of knee, right        10. Degenerative tear of posterior horn of lateral meniscus of right knee        11. Rupture of anterior cruciate ligament of right knee, subsequent encounter        12. Primary osteoarthritis of left knee        13. Derangement of unspecified medial meniscus due to old tear or injury, left knee                L Inj/Asp: bilateral knee on 8/5/2025 10:12 AM  Indications: pain and diagnostic evaluation  Details: 22 G needle, ultrasound-guided medial approach  Medications (Right): 25 mg sodium hyaluronate 10 mg/mL  Medications (Left): 25 mg sodium hyaluronate 10 mg/mL  Outcome: tolerated well, no immediate complications  Procedure, treatment alternatives, risks and benefits explained, specific risks discussed. Consent was given by the patient. Immediately prior to procedure a time out was called to verify the correct patient, procedure, equipment, support staff and site/side marked as required. Patient was prepped and draped in the usual sterile fashion.          Procedure   Time Out (5 Minutes): Yes  Patient Name: Tito Garcia \"David\"  YOB: 1950  Procedure: Supartz Injection 4/5  Needed Supplies Available: Correct " Supplies  Laterality: BILATERAL knee  Site Verified and Marked: Correct Site(s) Marked  Timeout Performed by Provider: Dr. Albino Chang D.O.  Staff Initials: [email or web address]    Patient is informed and consent has been signed by the patient if over 18 years old., Patient parent and/or legal guardian is informed and consent has been signed., Patient and/or parent and/or legal guardian accept all risks, benefits, and possible complications associated with procedure(s) and/or manipulation(s) and/or injection(s)., Patient and/or parent and/or legal guardian deny patient allergy or known complications with any of the medications, instruments, products, and/or techniques used., All questions and concerns were answered and/or addressed with the patient and/or parent and/or legal guardian., The patient and/or parent and/or legal guardian agree to proceed with the procedure(s) and/or manipulation(s) and/or injection(s)., Prep: The area was cleansed with a mixture of equal parts rubbing alcohol 70% and Hibclens., Utilizing clean technique, the injection site(s) were marked with a skin marker., and Injection site(s) were anesthestized with topical analgesic spray prior to injection.    Site was pre-anesthetized with approximately 11mL of a split solution containinmL of Lidocaine 2% (NDC: 6433-4619-17; LOT #: [id number] EXP: 2026)  5mL of xylocaine 20mg/mL (NDC: 10224-267-71 ; LOT #: [id number] EXP: 2028)  1mL of Sodium Bicarbonate 8.4%. (NDC: [address]-6; LOT #: [id number] EXP: 2026)    Performed regenerative Supartz Injection 4/5 into the patients BILATERAL knees, under ultrasound.  Supartz FX (25mg/2.5ml)     Band-aid and/or compressive bandage was placed over the injection site(s). After the injection(s) performed osteopathic manipulation therapy to the affected area(s) to help increase blood flow to aid with the healing process as well as circulation of the medication. The patient tolerated the procedure well  without any complications. The patient was instructed to gently massage the treatment site(s) as well as to apply moist heat to the affected area(s). Additionally, the patient was instructed to contact the office immediately with any complications or concerns.    The nurse Destinee were present in the room during the entire procedure.    The following discharge instructions were reviewed in detail with the patient to the level of their understanding:    PAIN:  A mild to moderate amount of discomfort, tenderness, stiffness, and achiness-caused by the inflammation of the area can be expected for a few days after the injection. This is normal and good as the inflammation is an important part of the healing process.    SKIN: Due to the numbing spray used, you may develop a red, itchy, scaly rash in the area that was sprayed. Should your skin become itchy, wash the area with mild soap and warm water. If needed, you may apply topical over-the-counter Hydrocortisone cream to alleviate any itchiness. AVOID scratching due to risk of infection.,     BATHING/SWIMMING: You may shower after your procedure with regular soap and water, but no baths, no swimming, and no hot tubs for 3-4 days after the procedure.,     ACTIVITIES:  Immediately following the injection, you may resume daily activities (such as work) and light exercise. We suggest that you refrain from strenuous activity and heavy lifting, particularly the injured body part, for a week post-procedure, but sometimes this can change as well.    MOIST HEAT/ICE:  Moist heat may be used on the injection area. NO ICE.  MEDICATION: You may continue your prescribed medications and any over-the-counter supplements; however, it is not recommended to use aspirin or anti-inflammatories (Motrin, Advil, Aleve, Ibuprofen, Naproxen etc.) for up to 2 weeks post procedure. Tylenol Extra Strength, Tylenol Arthritis and/or any prescribed narcotics or muscle relaxants are OK to  take.    APPOINTMENTS: You should have a follow-up appointment with Dr. Chang scheduled 1-3 weeks as recommended after your procedure for your next round of injections or to follow-up after you have completed your injection series. Please schedule your follow-up appointment on your way out after your procedure, or call the office to schedule these appointments as soon as possible.    PRECAUTIONS: Smoking, caffeine, alcohol, sex and anti-inflammatories post-procedure may reduce the effectiveness of Prolotherapy/Neural Prolotherapy/Prolozone injections. Early, rare post procedure problems that can be concerning are as follows: an increase in pain not relieved by medication (over the counter or prescription), a temperature above 101 degrees, bleeding or progressive, extreme swelling, or numbness.     CALL THE OFFICE OR GO TO THE EMERGENCY ROOM IF ANY OF THESE SYMPTOMS OCCUR.   If you have any questions or problems, please call our office at [phone]       Treatment or Intervention:  May continue to alternate ice and moist heat as needed  Reviewed degenerative meniscus tears, degenerative joint disease of the knees, patellofemoral tracking syndrome, knee instability, and/or patellar tendinitis in detail with the patient to the level of their understanding; a copy of this handout was provided to the patient at the time of this office visit.  Make sure to start into aquatic and physical Therapy 1-2 times a week for 8-10 weeks with manual therapy as well as dry needling and IASTM  Continue home exercises to be performed by the patient routinely  Continue wearing shoes with good stability control to help with the biomechanics affecting the knees and lower extremities  Continue wearing full foot insoles to help with the biomechanics affecting the knees and lower extremities  Recommendation to continue over-the-counter calcium with vitamin-D 2 -3000+ milligrams a day, as well as  a daily multivitamin.   Recommendation to  continue over-the-counter curcumin, turmeric, boswellia, as well as egg shell membrane as directed to aid with joint inflammation.  Recommendation to continue over-the-counter Move Free for joint health.  Patient advised regarding the risks and/or potential adverse reactions and/or side effects of any prescribed medications along with any over-the-counter medications or any supplements used. Patient advised to seek immediate medical care if any adverse reactions occur. The patient and/or patient(s) parent(s) verbalized their understanding  Reviewed bilateral knee MRI in detail with the patient and/or patients parent/legal guardian to their level of understanding; a copy of these results were provided to the patient and/or patients parent/legal guardian at the time of this office visit.  Discussed in detail with the patient to the level of their understanding the possibility in the future of regenerative injections versus corticosteroid injections versus viscosupplementation injections versus a combination if decide against surgical intervention or with what ever knee he decides not to get surgery on  After patient's knee replacement we will get back into the office to check for leg length discrepancy, will provide appropriate millimeter heel lift to be placed in the shoe to accommodate for leg length discrepancy found on standing erect pelvis xray  Make sure to keep follow-up appointments with Dr. Raphael and I also told him because of Dr. Raphael schedule he needs to get back in touch with them to tell him what knee he needs to get done and to get in for reevaluation to make sure that his surgery is scheduled for October which per Dr. Raphael it seems like this is true however he seems to have appointments lined up for him for lower leg evaluations because of swelling additionally sometime in August  Performed Supartz injection #4 into the patients bilateral knees under ultrasound. The patient tolerated the procedure well  and without any adverse effects. Discharge instructions for VISCOSUPPLEMENTATION injections were reviewed in detail with the patient to the level of their understanding; a copy of these instructions were provided to the patient at the time of this office visit.  Follow-up in 1-2 weeks for Supartz #5 bilateral knees under ultrasound      NERY SETH on 8/5/25 at 9:29 AM.     Please note that this report has been produced using speech recognition software.  It may contain errors related to grammar, punctuation or spelling.  Electronically signed, but not reviewed.      CARMITA Shin     Perform bilateral viscosupplementation injections under ultrasound         [1]   Family History  Problem Relation Name Age of Onset    Hypertension Mother Digna Garcia     Other (Brain Tumor) Mother Digna Garcia     Diabetes Mother Digna Garcia     Other (Bladder Cancer) Father      Other (cyst on spine) Sister      Melanoma Brother

## 2025-08-04 NOTE — PROGRESS NOTES
Physical Therapy Evaluation    Patient Name: Tito Garcia  MRN: [id number]  Evaluation Date: 8/4/2025  Time Calculation  Start Time: 1516  Stop Time: 1601  Time Calculation (min): 45 min  PT Evaluation Time Entry  PT Evaluation (Moderate) Time Entry: 35     PT Therapeutic Procedures Time Entry  Therapeutic Exercise Time Entry: 10      Problem List Items Addressed This Visit           ICD-10-CM    Muscle weakness - Primary M62.81    Relevant Orders    Follow Up In Physical Therapy    Stiffness of both knees M25.661, M25.662    Relevant Orders    Follow Up In Physical Therapy    Bilateral knee pain M25.561, M25.562    Relevant Orders    Follow Up In Physical Therapy       Subjective   General:  General  Referred By: Dr Chang    Patient reported hx of injury:   Pt is scheduled to have his knees replaced. Pt was diagnosed with a fib and surgeries have been postponed.  Pt then had BP meds changed to decrease LE swelling.  Pt has been seeing Dr Chang who has done some gel shots (last one tomorrow) which has helped pain in knees some.  Pt to see Dr Raphael next month.  Pt referred to PT by Dr Cahng to improve LE strength to prepare to TKRs.  Pt currently does elliptical/bicycle at home which he does 1/2 hour 5-6 days/ week    Precautions:   A fib    Relevant PMH:  A-fib (Multi), Arrhythmia, OA, Hyperlipidemia, Hypertension, Back surgery (L5 laminectomy), neuropathy    Red flags: Do you have any of the following? None  Fever/chills, unexplained weight changes, dizziness/fainting, unexplained change in bowel or bladder functions, unexplained malaise or muscle weakness, night pain/sweats, numbness or tingling    Pain:     Pain description: constant dull achy pain B ant knees which may become sharp with with movement, occasional pain in post knee and thigh or into shin    Pain at present: 2/10 now seated  Maximum pain: 7/10  Minimum pain: 2/10    Pain improves with: shots,changing position   Pain worsens with: stairs,  sleeping, walking > 3 min,  standing > 15 min    Home Living:       Home type: House- no stairs to enter; 5 steps with railings to bedrooms in home  Lives with: Spouse who has Alzheimer's (pt is caregiver)  Occupation: retired  Hobbies/activities: walks dog    Prior Function Per Pt/Caregiver Report:       Imaging:  MRI R knee:  IMPRESSION:  1. 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      2. Macerated posterior horn of the medial meniscus at the posterior  body segment with irregular appearing tear within the remainder of  the posterior horn.      3. Diffuse mucoid degeneration of the ACL which is displaced by  osteophytosis inner margin of the lateral femoral condyle.    MRI L knee:  IMPRESSION:  1. Stable appearing medial meniscus with diminutive posterior horn  from near the mid posterior horn towards the root ligament insertion.  Of note, there is a equivocal displaced meniscal flap centrally in  the intercondylar notch as opposed to discoid medial meniscus with  findings stable from prior imaging.      2. Tricompartmental osteoarthritis with severe patellofemoral and  medial femorotibial osteoarthritis.      3. Mild muscle strain of the lateral head gastrocnemius and plantaris  musculature.    Objective   Posture:   Ant head with forward rounded shoulders    Range of Motion:   B hip and ankle AROM are wfl.     Knee AROM L R   Flexion  deg  deg   Extension -13 deg -11 deg deg         Strength:     LE MMT L R   Hip flexion 4-/5 4-/5   Hip abduction 3+/5 3+/5   Hip adduction 3+/5 3+/5   Knee flexion 3-/5 3-/5   Knee extension 3-/5 3-/5   Ankle DF 4-/5 4-/5      Flexibility:   Tight hamstrings    Palpation:   Tender B medial joint line, around patellae and at infrapatellar lig    Joint Assessment:      Special Tests:   Patellar crepitus noted bilaterally      Gait:   Pt walks with cane with decreased heel strike and keeping B knees slightly  flexed    Transfers:   B UE assist for sit to stand    Outcome Measures:  WOMAC 40/96     Assessment       Pt is a 74 y.o. male who presents with impairments of B knee pain, stiffness and muscle weakenss. These impairments have led to functional limitations including tolerance to walking, standing and stairs. Pt would benefit from skilled physical therapy intervention to improve above impairments and facilitate return to function.    Complexity of Evaluation: Moderate    Based on the history including personal factors and/or comorbidities, examination of body systems including body structures and function, activity limitations, and/or participation restrictions, as well as clinical presentation, patient meets criteria for a Moderate complexity evaluation.    Plan  Treatment/Interventions: Aquatic therapy, Education/ Instruction, Electrical stimulation, Gait training, Manual therapy, Neuromuscular re-education, Taping techniques, Therapeutic activities, Therapeutic exercises, Ultrasound  PT Plan: Skilled PT  PT Frequency:  (1-2x/week for up to 10 visits)  Certification Period Start Date: 08/04/25  Certification Period End Date: 11/02/25  Number of Treatments Authorized: MEDICARE A/B, [facility], [place] ( $0 USED PT/ST)  Rehab Potential: Good  Plan of Care Agreement: Patient    Insurance Plan: Payor: MEDICARE / Plan: MEDICARE PART A AND B / Product Type: *No Product type* /     Plan for next visit: Progress LE exs to improve strength and flexibility as able and to improve functional mobility to help pt prepare for TKR.  Manual and pt education as necessary.  Pt may also benefot from aquatic PT but is more interested in beginning with land based exs that he can work on at home.    OP EDUCATION:  Outpatient Education  Individual(s) Educated: Patient  Education Provided: Anatomy, Home Exercise Program, Body Mechanics, Physiology, POC  Risk and Benefits Discussed with Patient/Caregiver/Other: yes  Patient/Caregiver Demonstrated  "Understanding: yes  Plan of Care Discussed and Agreed Upon: yes  Patient Response to Education: Patient/Caregiver Verbalized Understanding of Information, Patient/Caregiver Performed Return Demonstration of Exercises/Activities, Patient/Caregiver Asked Appropriate Questions    Today's Treatment:  Therapeutic Exercise  HEP to be completed daily, exercises include:  SLR R/L 10x  Quad sets with large towel rolls 5\"x10  Seated isometric hip add 5\"x10  Seated hip abd into level 4 tband 15x  Written instructions provided to pt.  Pt to perform 2x10 of all exs daily and increase to 2x15 as able    Goals:  STGs to be met in 6 visits:  Pt will move sit to stand with 1 UE assist.  Pt will be independent with HEP to improve LE strength    LTGs to be met in 10 visits:  Pt will be demonstrate increased  hamstring flexibility  Pt will demonstrate increased strength in LE by > 1/2 MMT grade so pt can walk >8 min safely.  Pt will be independent with HEP to allow pt to care for wife and himself and walk dog while preparing for TKR    Pt's goal: To get leg stronger so TKR surgery is successful               "

## 2025-08-05 ENCOUNTER — HOSPITAL ENCOUNTER (OUTPATIENT)
Dept: RADIOLOGY | Facility: EXTERNAL LOCATION | Age: 75
Discharge: HOME | End: 2025-08-05

## 2025-08-05 ENCOUNTER — OFFICE VISIT (OUTPATIENT)
Dept: ORTHOPEDIC SURGERY | Facility: CLINIC | Age: 75
End: 2025-08-05
Payer: MEDICARE

## 2025-08-05 VITALS
DIASTOLIC BLOOD PRESSURE: 70 MMHG | HEIGHT: 71 IN | WEIGHT: 260 LBS | BODY MASS INDEX: 36.4 KG/M2 | HEART RATE: 60 BPM | SYSTOLIC BLOOD PRESSURE: 114 MMHG

## 2025-08-05 DIAGNOSIS — M71.21 BAKER'S CYST OF KNEE, RIGHT: ICD-10-CM

## 2025-08-05 DIAGNOSIS — M71.21: ICD-10-CM

## 2025-08-05 DIAGNOSIS — M23.231 DERANGEMENT OF OTHER MEDIAL MENISCUS DUE TO OLD TEAR OR INJURY, RIGHT KNEE: ICD-10-CM

## 2025-08-05 DIAGNOSIS — M23.222 DERANGEMENT OF POSTERIOR HORN OF MEDIAL MENISCUS DUE TO OLD TEAR OR INJURY, LEFT KNEE: ICD-10-CM

## 2025-08-05 DIAGNOSIS — M23.204 DERANGEMENT OF UNSPECIFIED MEDIAL MENISCUS DUE TO OLD TEAR OR INJURY, LEFT KNEE: ICD-10-CM

## 2025-08-05 DIAGNOSIS — M65.90: ICD-10-CM

## 2025-08-05 DIAGNOSIS — M23.41 LOOSE BODY IN KNEE, RIGHT KNEE: ICD-10-CM

## 2025-08-05 DIAGNOSIS — M23.351 DEGENERATIVE TEAR OF POSTERIOR HORN OF LATERAL MENISCUS OF RIGHT KNEE: ICD-10-CM

## 2025-08-05 DIAGNOSIS — M25.00: ICD-10-CM

## 2025-08-05 DIAGNOSIS — M70.52 PATELLAR BURSITIS OF LEFT KNEE: ICD-10-CM

## 2025-08-05 DIAGNOSIS — M17.11 PRIMARY OSTEOARTHRITIS OF RIGHT KNEE: ICD-10-CM

## 2025-08-05 DIAGNOSIS — M23.321 DEGENERATIVE TEAR OF POSTERIOR HORN OF MEDIAL MENISCUS OF RIGHT KNEE: ICD-10-CM

## 2025-08-05 DIAGNOSIS — M17.12 PRIMARY OSTEOARTHRITIS OF LEFT KNEE: ICD-10-CM

## 2025-08-05 DIAGNOSIS — S83.511D RUPTURE OF ANTERIOR CRUCIATE LIGAMENT OF RIGHT KNEE, SUBSEQUENT ENCOUNTER: ICD-10-CM

## 2025-08-05 PROCEDURE — 3078F DIAST BP <80 MM HG: CPT | Performed by: FAMILY MEDICINE

## 2025-08-05 PROCEDURE — 1159F MED LIST DOCD IN RCRD: CPT | Performed by: FAMILY MEDICINE

## 2025-08-05 PROCEDURE — 99214 OFFICE O/P EST MOD 30 MIN: CPT | Performed by: FAMILY MEDICINE

## 2025-08-05 PROCEDURE — 3008F BODY MASS INDEX DOCD: CPT | Performed by: FAMILY MEDICINE

## 2025-08-05 PROCEDURE — 2500000004 HC RX 250 GENERAL PHARMACY W/ HCPCS (ALT 636 FOR OP/ED): Performed by: FAMILY MEDICINE

## 2025-08-05 PROCEDURE — 1125F AMNT PAIN NOTED PAIN PRSNT: CPT | Performed by: FAMILY MEDICINE

## 2025-08-05 PROCEDURE — 20611 DRAIN/INJ JOINT/BURSA W/US: CPT | Mod: 50 | Performed by: FAMILY MEDICINE

## 2025-08-05 PROCEDURE — 3074F SYST BP LT 130 MM HG: CPT | Performed by: FAMILY MEDICINE

## 2025-08-05 RX ADMIN — SODIUM HYALURONATE INTRA-ARTICULAR SOLN PREF SYR 25 MG/2.5ML 25 MG: 25/2.5 SOLUTION PREFILLED SYRINGE at 10:12

## 2025-08-05 ASSESSMENT — ENCOUNTER SYMPTOMS
OCCASIONAL FEELINGS OF UNSTEADINESS: 1
LOSS OF SENSATION IN FEET: 0
DEPRESSION: 0

## 2025-08-05 ASSESSMENT — PAIN SCALES - GENERAL: PAINLEVEL_OUTOF10: 2

## 2025-08-12 ENCOUNTER — OFFICE VISIT (OUTPATIENT)
Dept: ORTHOPEDIC SURGERY | Facility: CLINIC | Age: 75
End: 2025-08-12
Payer: MEDICARE

## 2025-08-12 VITALS
SYSTOLIC BLOOD PRESSURE: 130 MMHG | DIASTOLIC BLOOD PRESSURE: 72 MMHG | HEART RATE: 65 BPM | BODY MASS INDEX: 36.4 KG/M2 | HEIGHT: 71 IN | WEIGHT: 260 LBS

## 2025-08-12 DIAGNOSIS — M17.11 PRIMARY OSTEOARTHRITIS OF RIGHT KNEE: ICD-10-CM

## 2025-08-12 DIAGNOSIS — M17.0 PRIMARY OSTEOARTHRITIS OF BOTH KNEES: ICD-10-CM

## 2025-08-12 DIAGNOSIS — Q66.6 VALGUS DEFORMITY OF BOTH FEET: ICD-10-CM

## 2025-08-12 DIAGNOSIS — M17.12 PRIMARY OSTEOARTHRITIS OF LEFT KNEE: ICD-10-CM

## 2025-08-12 DIAGNOSIS — R29.898 WEAKNESS OF RIGHT LOWER EXTREMITY: ICD-10-CM

## 2025-08-12 PROCEDURE — 3075F SYST BP GE 130 - 139MM HG: CPT | Performed by: FAMILY MEDICINE

## 2025-08-12 PROCEDURE — 3078F DIAST BP <80 MM HG: CPT | Performed by: FAMILY MEDICINE

## 2025-08-12 PROCEDURE — 20611 DRAIN/INJ JOINT/BURSA W/US: CPT | Mod: 50 | Performed by: FAMILY MEDICINE

## 2025-08-12 PROCEDURE — 1125F AMNT PAIN NOTED PAIN PRSNT: CPT | Performed by: FAMILY MEDICINE

## 2025-08-12 PROCEDURE — 99214 OFFICE O/P EST MOD 30 MIN: CPT | Performed by: FAMILY MEDICINE

## 2025-08-12 PROCEDURE — 3008F BODY MASS INDEX DOCD: CPT | Performed by: FAMILY MEDICINE

## 2025-08-12 PROCEDURE — 2500000004 HC RX 250 GENERAL PHARMACY W/ HCPCS (ALT 636 FOR OP/ED): Performed by: FAMILY MEDICINE

## 2025-08-12 PROCEDURE — 1159F MED LIST DOCD IN RCRD: CPT | Performed by: FAMILY MEDICINE

## 2025-08-12 RX ADMIN — Medication 2 ML: at 09:39

## 2025-08-12 ASSESSMENT — PAIN SCALES - GENERAL
PAINLEVEL_OUTOF10: 2
PAINLEVEL_OUTOF10: 2

## 2025-08-12 ASSESSMENT — PAIN DESCRIPTION - DESCRIPTORS: DESCRIPTORS: ACHING;SORE

## 2025-08-12 ASSESSMENT — ENCOUNTER SYMPTOMS
OCCASIONAL FEELINGS OF UNSTEADINESS: 0
DEPRESSION: 0
LOSS OF SENSATION IN FEET: 0

## 2025-08-12 ASSESSMENT — PAIN - FUNCTIONAL ASSESSMENT: PAIN_FUNCTIONAL_ASSESSMENT: 0-10

## 2025-08-29 ENCOUNTER — TREATMENT (OUTPATIENT)
Dept: PHYSICAL THERAPY | Facility: CLINIC | Age: 75
End: 2025-08-29
Payer: MEDICARE

## 2025-08-29 DIAGNOSIS — M25.661 STIFFNESS OF BOTH KNEES: ICD-10-CM

## 2025-08-29 DIAGNOSIS — M25.662 STIFFNESS OF BOTH KNEES: ICD-10-CM

## 2025-08-29 DIAGNOSIS — M62.81 MUSCLE WEAKNESS: ICD-10-CM

## 2025-08-29 DIAGNOSIS — M25.561 BILATERAL KNEE PAIN: ICD-10-CM

## 2025-08-29 DIAGNOSIS — M25.562 BILATERAL KNEE PAIN: ICD-10-CM

## 2025-08-29 PROCEDURE — 97110 THERAPEUTIC EXERCISES: CPT | Mod: GP,CQ

## 2025-09-03 ENCOUNTER — TREATMENT (OUTPATIENT)
Dept: PHYSICAL THERAPY | Facility: CLINIC | Age: 75
End: 2025-09-03
Payer: MEDICARE

## 2025-09-03 DIAGNOSIS — M25.662 STIFFNESS OF BOTH KNEES: ICD-10-CM

## 2025-09-03 DIAGNOSIS — M25.562 BILATERAL KNEE PAIN: ICD-10-CM

## 2025-09-03 DIAGNOSIS — M62.81 MUSCLE WEAKNESS: ICD-10-CM

## 2025-09-03 DIAGNOSIS — M25.561 BILATERAL KNEE PAIN: ICD-10-CM

## 2025-09-03 DIAGNOSIS — M25.661 STIFFNESS OF BOTH KNEES: ICD-10-CM

## 2025-09-03 PROCEDURE — 97110 THERAPEUTIC EXERCISES: CPT | Mod: GP

## 2025-10-27 ENCOUNTER — APPOINTMENT (OUTPATIENT)
Dept: CARDIOLOGY | Facility: CLINIC | Age: 75
End: 2025-10-27
Payer: MEDICARE

## (undated) DEVICE — DRESSING, MEPILEX FOAM BORDER AG, SILVER, 4 X 4

## (undated) DEVICE — SOLUTION, IRRIGATION, X RX SODIUM CHL 0.9%, 500ML BTL

## (undated) DEVICE — DRESSING, ABDOMINAL, TENDERSORB, 8 X 7-1/2 IN, STERILE

## (undated) DEVICE — WATER STERILE, FOR IRRIGATION, 1000ML, W/HANGER

## (undated) DEVICE — Device

## (undated) DEVICE — NEEDLE, SPINAL, 22 G X 3.5 IN, BLACK HUB

## (undated) DEVICE — SUTURE, VICRYL, 1, 36 IN, CT-1, UNDYED

## (undated) DEVICE — PROTECTIVE, FOOT, FOAM PAD & COHESIVE WRAP, STERILE

## (undated) DEVICE — SOLUTION, POVIDONE IODINE 10%, 0.75 OZ, NS

## (undated) DEVICE — CUFF, TOURNIQUET, 30 X 4, DUAL PORT/SNGL BLADDER, DISP, LF

## (undated) DEVICE — SUTURE, CTD, VICRYL, 2-0, UND, BR, CT-2

## (undated) DEVICE — SYRINGE, 50 CC, LUER LOCK

## (undated) DEVICE — TRAY, DRY PREP, PREMIUM